# Patient Record
Sex: FEMALE | Race: ASIAN | NOT HISPANIC OR LATINO | Employment: UNEMPLOYED | URBAN - METROPOLITAN AREA
[De-identification: names, ages, dates, MRNs, and addresses within clinical notes are randomized per-mention and may not be internally consistent; named-entity substitution may affect disease eponyms.]

---

## 2017-01-24 ENCOUNTER — OFFICE VISIT - HEALTHEAST (OUTPATIENT)
Dept: FAMILY MEDICINE | Facility: CLINIC | Age: 52
End: 2017-01-24

## 2017-01-24 DIAGNOSIS — M54.41 CHRONIC RIGHT-SIDED LOW BACK PAIN WITH RIGHT-SIDED SCIATICA: ICD-10-CM

## 2017-01-24 DIAGNOSIS — G89.29 CHRONIC RIGHT-SIDED LOW BACK PAIN WITH RIGHT-SIDED SCIATICA: ICD-10-CM

## 2017-01-24 ASSESSMENT — MIFFLIN-ST. JEOR: SCORE: 1175.91

## 2017-02-09 ENCOUNTER — HOSPITAL ENCOUNTER (OUTPATIENT)
Dept: PHYSICAL MEDICINE AND REHAB | Facility: CLINIC | Age: 52
Discharge: HOME | End: 2017-02-09
Attending: PHYSICIAN ASSISTANT

## 2017-02-09 DIAGNOSIS — M51.26 LUMBAR DISC HERNIATION: ICD-10-CM

## 2017-02-09 DIAGNOSIS — M47.816 LUMBAR FACET ARTHROPATHY: ICD-10-CM

## 2017-02-09 DIAGNOSIS — M54.16 RIGHT LUMBAR RADICULOPATHY: ICD-10-CM

## 2017-02-09 DIAGNOSIS — M48.061 LUMBAR SPINAL STENOSIS: ICD-10-CM

## 2017-04-24 ENCOUNTER — HOSPITAL ENCOUNTER (OUTPATIENT)
Dept: LAB | Age: 52
Setting detail: SPECIMEN
Discharge: HOME | End: 2017-04-24

## 2017-04-24 ENCOUNTER — OFFICE VISIT - HEALTHEAST (OUTPATIENT)
Dept: FAMILY MEDICINE | Facility: CLINIC | Age: 52
End: 2017-04-24

## 2017-04-24 DIAGNOSIS — R30.0 DYSURIA: ICD-10-CM

## 2017-04-24 DIAGNOSIS — N30.00 ACUTE CYSTITIS WITHOUT HEMATURIA: ICD-10-CM

## 2017-04-24 ASSESSMENT — MIFFLIN-ST. JEOR: SCORE: 1197.46

## 2017-09-08 ENCOUNTER — OFFICE VISIT - HEALTHEAST (OUTPATIENT)
Dept: FAMILY MEDICINE | Facility: CLINIC | Age: 52
End: 2017-09-08

## 2017-09-08 DIAGNOSIS — N30.00 ACUTE CYSTITIS WITHOUT HEMATURIA: ICD-10-CM

## 2017-09-08 DIAGNOSIS — Z00.00 ROUTINE GENERAL MEDICAL EXAMINATION AT A HEALTH CARE FACILITY: ICD-10-CM

## 2017-09-08 DIAGNOSIS — G89.29 CHRONIC RIGHT-SIDED LOW BACK PAIN WITH RIGHT-SIDED SCIATICA: ICD-10-CM

## 2017-09-08 DIAGNOSIS — M54.41 CHRONIC RIGHT-SIDED LOW BACK PAIN WITH RIGHT-SIDED SCIATICA: ICD-10-CM

## 2017-09-08 ASSESSMENT — MIFFLIN-ST. JEOR: SCORE: 1184.99

## 2017-09-21 ENCOUNTER — AMBULATORY - HEALTHEAST (OUTPATIENT)
Dept: FAMILY MEDICINE | Facility: CLINIC | Age: 52
End: 2017-09-21

## 2017-09-22 ENCOUNTER — RECORDS - HEALTHEAST (OUTPATIENT)
Dept: MAMMOGRAPHY | Facility: CLINIC | Age: 52
End: 2017-09-22

## 2017-09-22 ENCOUNTER — OFFICE VISIT - HEALTHEAST (OUTPATIENT)
Dept: FAMILY MEDICINE | Facility: CLINIC | Age: 52
End: 2017-09-22

## 2017-09-22 DIAGNOSIS — N30.00 ACUTE CYSTITIS WITHOUT HEMATURIA: ICD-10-CM

## 2017-09-22 DIAGNOSIS — Z12.31 ENCOUNTER FOR SCREENING MAMMOGRAM FOR MALIGNANT NEOPLASM OF BREAST: ICD-10-CM

## 2017-09-22 DIAGNOSIS — Z23 NEED FOR VACCINATION: ICD-10-CM

## 2017-09-22 DIAGNOSIS — R52 PAIN: ICD-10-CM

## 2017-09-22 DIAGNOSIS — R20.8 BURNING SENSATION: ICD-10-CM

## 2017-09-22 ASSESSMENT — MIFFLIN-ST. JEOR: SCORE: 1186.12

## 2017-11-20 ENCOUNTER — OFFICE VISIT - HEALTHEAST (OUTPATIENT)
Dept: FAMILY MEDICINE | Facility: CLINIC | Age: 52
End: 2017-11-20

## 2017-11-20 DIAGNOSIS — M54.41 CHRONIC RIGHT-SIDED LOW BACK PAIN WITH RIGHT-SIDED SCIATICA: ICD-10-CM

## 2017-11-20 DIAGNOSIS — G89.29 CHRONIC RIGHT-SIDED LOW BACK PAIN WITH RIGHT-SIDED SCIATICA: ICD-10-CM

## 2017-11-20 DIAGNOSIS — R51.9 NONINTRACTABLE HEADACHE, UNSPECIFIED CHRONICITY PATTERN, UNSPECIFIED HEADACHE TYPE: ICD-10-CM

## 2017-11-20 ASSESSMENT — MIFFLIN-ST. JEOR: SCORE: 1174.78

## 2018-01-29 ENCOUNTER — OFFICE VISIT - HEALTHEAST (OUTPATIENT)
Dept: FAMILY MEDICINE | Facility: CLINIC | Age: 53
End: 2018-01-29

## 2018-01-29 DIAGNOSIS — G89.29 CHRONIC RIGHT-SIDED LOW BACK PAIN WITH RIGHT-SIDED SCIATICA: ICD-10-CM

## 2018-01-29 DIAGNOSIS — M54.41 CHRONIC RIGHT-SIDED LOW BACK PAIN WITH RIGHT-SIDED SCIATICA: ICD-10-CM

## 2018-01-29 RX ORDER — IBUPROFEN 400 MG/1
400 TABLET, FILM COATED ORAL DAILY PRN
Qty: 30 TABLET | Refills: 11 | Status: SHIPPED | OUTPATIENT
Start: 2018-01-29

## 2018-01-29 ASSESSMENT — MIFFLIN-ST. JEOR: SCORE: 1172.51

## 2018-03-30 ENCOUNTER — OFFICE VISIT - HEALTHEAST (OUTPATIENT)
Dept: FAMILY MEDICINE | Facility: CLINIC | Age: 53
End: 2018-03-30

## 2018-03-30 DIAGNOSIS — G89.29 CHRONIC RIGHT-SIDED LOW BACK PAIN WITHOUT SCIATICA: ICD-10-CM

## 2018-03-30 DIAGNOSIS — M54.50 CHRONIC RIGHT-SIDED LOW BACK PAIN WITHOUT SCIATICA: ICD-10-CM

## 2018-03-30 ASSESSMENT — MIFFLIN-ST. JEOR: SCORE: 1180.45

## 2018-04-09 ENCOUNTER — OFFICE VISIT - HEALTHEAST (OUTPATIENT)
Dept: FAMILY MEDICINE | Facility: CLINIC | Age: 53
End: 2018-04-09

## 2018-04-09 DIAGNOSIS — R41.3 MEMORY LOSS: ICD-10-CM

## 2018-04-09 ASSESSMENT — MIFFLIN-ST. JEOR: SCORE: 1180.45

## 2018-05-11 ENCOUNTER — OFFICE VISIT - HEALTHEAST (OUTPATIENT)
Dept: FAMILY MEDICINE | Facility: CLINIC | Age: 53
End: 2018-05-11

## 2018-05-11 DIAGNOSIS — M54.41 CHRONIC RIGHT-SIDED LOW BACK PAIN WITH RIGHT-SIDED SCIATICA: ICD-10-CM

## 2018-05-11 DIAGNOSIS — G89.29 CHRONIC RIGHT-SIDED LOW BACK PAIN WITH RIGHT-SIDED SCIATICA: ICD-10-CM

## 2018-05-11 ASSESSMENT — MIFFLIN-ST. JEOR: SCORE: 1191.79

## 2018-06-04 ENCOUNTER — COMMUNICATION - HEALTHEAST (OUTPATIENT)
Dept: PHYSICAL MEDICINE AND REHAB | Facility: CLINIC | Age: 53
End: 2018-06-04

## 2018-06-26 ENCOUNTER — RECORDS - HEALTHEAST (OUTPATIENT)
Dept: ADMINISTRATIVE | Facility: OTHER | Age: 53
End: 2018-06-26

## 2018-06-29 ENCOUNTER — OFFICE VISIT - HEALTHEAST (OUTPATIENT)
Dept: FAMILY MEDICINE | Facility: CLINIC | Age: 53
End: 2018-06-29

## 2018-06-29 DIAGNOSIS — R51.9 HEADACHE: ICD-10-CM

## 2018-07-03 ENCOUNTER — RECORDS - HEALTHEAST (OUTPATIENT)
Dept: ADMINISTRATIVE | Facility: OTHER | Age: 53
End: 2018-07-03

## 2018-07-17 ENCOUNTER — RECORDS - HEALTHEAST (OUTPATIENT)
Dept: ADMINISTRATIVE | Facility: OTHER | Age: 53
End: 2018-07-17

## 2019-05-07 ENCOUNTER — OFFICE VISIT - HEALTHEAST (OUTPATIENT)
Dept: FAMILY MEDICINE | Facility: CLINIC | Age: 54
End: 2019-05-07

## 2019-05-07 DIAGNOSIS — L30.9 DERMATITIS: ICD-10-CM

## 2019-05-07 DIAGNOSIS — J18.9 PNEUMONIA OF LEFT LOWER LOBE DUE TO INFECTIOUS ORGANISM: ICD-10-CM

## 2019-05-07 RX ORDER — DEXTROMETHORPHAN HBR. AND GUAIFENESIN 10; 100 MG/5ML; MG/5ML
SOLUTION ORAL
Qty: 240 ML | Refills: 3 | Status: SHIPPED | OUTPATIENT
Start: 2019-05-07 | End: 2022-07-14

## 2019-05-07 ASSESSMENT — MIFFLIN-ST. JEOR: SCORE: 1186.12

## 2019-06-18 ENCOUNTER — OFFICE VISIT - HEALTHEAST (OUTPATIENT)
Dept: FAMILY MEDICINE | Facility: CLINIC | Age: 54
End: 2019-06-18

## 2019-06-18 ENCOUNTER — RECORDS - HEALTHEAST (OUTPATIENT)
Dept: MAMMOGRAPHY | Facility: CLINIC | Age: 54
End: 2019-06-18

## 2019-06-18 DIAGNOSIS — J18.9 PNEUMONIA OF LEFT LOWER LOBE DUE TO INFECTIOUS ORGANISM: ICD-10-CM

## 2019-06-18 DIAGNOSIS — R52 PAIN: ICD-10-CM

## 2019-06-18 DIAGNOSIS — J18.1 LOBAR PNEUMONIA, UNSPECIFIED ORGANISM (H): ICD-10-CM

## 2019-06-18 DIAGNOSIS — Z12.31 ENCOUNTER FOR SCREENING MAMMOGRAM FOR MALIGNANT NEOPLASM OF BREAST: ICD-10-CM

## 2019-06-18 ASSESSMENT — MIFFLIN-ST. JEOR: SCORE: 1187.09

## 2019-06-19 ENCOUNTER — COMMUNICATION - HEALTHEAST (OUTPATIENT)
Dept: FAMILY MEDICINE | Facility: CLINIC | Age: 54
End: 2019-06-19

## 2019-07-22 ENCOUNTER — HOSPITAL ENCOUNTER (OUTPATIENT)
Dept: MAMMOGRAPHY | Facility: CLINIC | Age: 54
Discharge: HOME | End: 2019-07-22
Attending: FAMILY MEDICINE

## 2019-07-22 DIAGNOSIS — N64.89 BREAST ASYMMETRY: ICD-10-CM

## 2019-08-13 ENCOUNTER — OFFICE VISIT - HEALTHEAST (OUTPATIENT)
Dept: FAMILY MEDICINE | Facility: CLINIC | Age: 54
End: 2019-08-13

## 2019-08-13 DIAGNOSIS — R91.1 LUNG NODULE: ICD-10-CM

## 2019-08-13 DIAGNOSIS — M72.2 PLANTAR FASCIITIS: ICD-10-CM

## 2019-08-13 ASSESSMENT — MIFFLIN-ST. JEOR: SCORE: 1182.55

## 2019-08-21 ENCOUNTER — HOSPITAL ENCOUNTER (OUTPATIENT)
Dept: CT IMAGING | Facility: HOSPITAL | Age: 54
Discharge: HOME | End: 2019-08-21
Attending: FAMILY MEDICINE

## 2019-08-21 DIAGNOSIS — R91.1 LUNG NODULE: ICD-10-CM

## 2019-09-10 ENCOUNTER — OFFICE VISIT - HEALTHEAST (OUTPATIENT)
Dept: FAMILY MEDICINE | Facility: CLINIC | Age: 54
End: 2019-09-10

## 2019-09-10 DIAGNOSIS — D32.9 MENINGIOMA (H): ICD-10-CM

## 2019-09-10 DIAGNOSIS — J98.4 CALCIFIED GRANULOMA OF LUNG: ICD-10-CM

## 2019-09-10 ASSESSMENT — MIFFLIN-ST. JEOR: SCORE: 1188.2

## 2019-12-18 ENCOUNTER — OFFICE VISIT - HEALTHEAST (OUTPATIENT)
Dept: FAMILY MEDICINE | Facility: CLINIC | Age: 54
End: 2019-12-18

## 2019-12-18 ENCOUNTER — COMMUNICATION - HEALTHEAST (OUTPATIENT)
Dept: FAMILY MEDICINE | Facility: CLINIC | Age: 54
End: 2019-12-18

## 2019-12-18 DIAGNOSIS — N30.01 ACUTE CYSTITIS WITH HEMATURIA: ICD-10-CM

## 2019-12-18 DIAGNOSIS — L30.9 DERMATITIS: ICD-10-CM

## 2019-12-18 DIAGNOSIS — R30.0 DYSURIA: ICD-10-CM

## 2019-12-18 LAB
ALBUMIN UR-MCNC: ABNORMAL MG/DL
APPEARANCE UR: ABNORMAL
BILIRUB UR QL STRIP: NEGATIVE
COLOR UR AUTO: YELLOW
GLUCOSE UR STRIP-MCNC: NEGATIVE MG/DL
HGB UR QL STRIP: ABNORMAL
KETONES UR STRIP-MCNC: NEGATIVE MG/DL
LEUKOCYTE ESTERASE UR QL STRIP: ABNORMAL
NITRATE UR QL: NEGATIVE
PH UR STRIP: 5.5 [PH] (ref 5–8)
SP GR UR STRIP: 1.02 (ref 1–1.03)
UROBILINOGEN UR STRIP-ACNC: ABNORMAL

## 2019-12-18 RX ORDER — HYDROCORTISONE 2.5 %
CREAM (GRAM) TOPICAL
Qty: 30 G | Refills: 5 | Status: SHIPPED | OUTPATIENT
Start: 2019-12-18 | End: 2021-12-17

## 2019-12-18 ASSESSMENT — MIFFLIN-ST. JEOR: SCORE: 1209.47

## 2019-12-19 LAB — BACTERIA SPEC CULT: NO GROWTH

## 2020-01-21 ENCOUNTER — OFFICE VISIT - HEALTHEAST (OUTPATIENT)
Dept: FAMILY MEDICINE | Facility: CLINIC | Age: 55
End: 2020-01-21

## 2020-01-21 DIAGNOSIS — D32.9 MENINGIOMA (H): ICD-10-CM

## 2020-01-21 DIAGNOSIS — R52 PAIN: ICD-10-CM

## 2020-01-21 DIAGNOSIS — R30.0 DYSURIA: ICD-10-CM

## 2020-01-21 LAB
ALBUMIN UR-MCNC: ABNORMAL MG/DL
ANION GAP SERPL CALCULATED.3IONS-SCNC: 8 MMOL/L (ref 5–18)
APPEARANCE UR: ABNORMAL
BACTERIA #/AREA URNS HPF: ABNORMAL HPF
BILIRUB UR QL STRIP: NEGATIVE
BUN SERPL-MCNC: 10 MG/DL (ref 8–22)
CALCIUM SERPL-MCNC: 9.1 MG/DL (ref 8.5–10.5)
CAOX CRY #/AREA URNS HPF: PRESENT /[HPF]
CHLORIDE BLD-SCNC: 105 MMOL/L (ref 98–107)
CO2 SERPL-SCNC: 27 MMOL/L (ref 22–31)
COLOR UR AUTO: YELLOW
CREAT SERPL-MCNC: 0.75 MG/DL (ref 0.6–1.1)
GFR SERPL CREATININE-BSD FRML MDRD: >60 ML/MIN/1.73M2
GLUCOSE BLD-MCNC: 125 MG/DL (ref 70–125)
GLUCOSE UR STRIP-MCNC: NEGATIVE MG/DL
HGB UR QL STRIP: ABNORMAL
KETONES UR STRIP-MCNC: NEGATIVE MG/DL
LEUKOCYTE ESTERASE UR QL STRIP: NEGATIVE
MUCOUS THREADS #/AREA URNS LPF: ABNORMAL LPF
NITRATE UR QL: NEGATIVE
PH UR STRIP: 5.5 [PH] (ref 5–8)
POTASSIUM BLD-SCNC: 3.6 MMOL/L (ref 3.5–5)
RBC #/AREA URNS AUTO: ABNORMAL HPF
SODIUM SERPL-SCNC: 140 MMOL/L (ref 136–145)
SP GR UR STRIP: >=1.03 (ref 1–1.03)
SQUAMOUS #/AREA URNS AUTO: ABNORMAL LPF
UROBILINOGEN UR STRIP-ACNC: ABNORMAL
WBC #/AREA URNS AUTO: ABNORMAL HPF

## 2020-01-21 ASSESSMENT — MIFFLIN-ST. JEOR: SCORE: 1199.54

## 2020-01-29 ENCOUNTER — HOSPITAL ENCOUNTER (OUTPATIENT)
Dept: MRI IMAGING | Facility: HOSPITAL | Age: 55
Discharge: HOME | End: 2020-01-29
Attending: FAMILY MEDICINE

## 2020-01-29 DIAGNOSIS — D32.9 MENINGIOMA (H): ICD-10-CM

## 2020-02-04 ENCOUNTER — OFFICE VISIT - HEALTHEAST (OUTPATIENT)
Dept: FAMILY MEDICINE | Facility: CLINIC | Age: 55
End: 2020-02-04

## 2020-02-04 DIAGNOSIS — D32.9 MENINGIOMA (H): ICD-10-CM

## 2020-02-04 ASSESSMENT — MIFFLIN-ST. JEOR: SCORE: 1200.7

## 2020-02-14 ENCOUNTER — OFFICE VISIT - HEALTHEAST (OUTPATIENT)
Dept: FAMILY MEDICINE | Facility: CLINIC | Age: 55
End: 2020-02-14

## 2020-02-14 DIAGNOSIS — R30.0 DYSURIA: ICD-10-CM

## 2020-02-14 ASSESSMENT — MIFFLIN-ST. JEOR: SCORE: 1201.32

## 2020-06-22 ENCOUNTER — OFFICE VISIT - HEALTHEAST (OUTPATIENT)
Dept: FAMILY MEDICINE | Facility: CLINIC | Age: 55
End: 2020-06-22

## 2020-06-22 DIAGNOSIS — L30.9 DERMATITIS: ICD-10-CM

## 2020-06-22 ASSESSMENT — PATIENT HEALTH QUESTIONNAIRE - PHQ9: SUM OF ALL RESPONSES TO PHQ QUESTIONS 1-9: 5

## 2020-06-23 RX ORDER — TRIAMCINOLONE ACETONIDE 1 MG/G
CREAM TOPICAL
Qty: 80 G | Refills: 5 | Status: SHIPPED | OUTPATIENT
Start: 2020-06-23 | End: 2022-01-13

## 2020-11-27 ENCOUNTER — OFFICE VISIT - HEALTHEAST (OUTPATIENT)
Dept: FAMILY MEDICINE | Facility: CLINIC | Age: 55
End: 2020-11-27

## 2020-11-27 DIAGNOSIS — N30.00 ACUTE CYSTITIS WITHOUT HEMATURIA: ICD-10-CM

## 2020-11-27 DIAGNOSIS — Z23 IMMUNIZATION DUE: ICD-10-CM

## 2020-11-27 ASSESSMENT — MIFFLIN-ST. JEOR: SCORE: 1199.05

## 2021-01-04 ENCOUNTER — OFFICE VISIT - HEALTHEAST (OUTPATIENT)
Dept: FAMILY MEDICINE | Facility: CLINIC | Age: 56
End: 2021-01-04

## 2021-01-04 DIAGNOSIS — M54.41 CHRONIC RIGHT-SIDED LOW BACK PAIN WITH RIGHT-SIDED SCIATICA: ICD-10-CM

## 2021-01-04 DIAGNOSIS — G89.29 CHRONIC RIGHT-SIDED LOW BACK PAIN WITH RIGHT-SIDED SCIATICA: ICD-10-CM

## 2021-01-04 DIAGNOSIS — R52 PAIN: ICD-10-CM

## 2021-01-04 RX ORDER — GABAPENTIN 100 MG/1
CAPSULE ORAL
Qty: 30 CAPSULE | Refills: 11 | Status: SHIPPED | OUTPATIENT
Start: 2021-01-04 | End: 2022-03-11

## 2021-01-04 RX ORDER — ACETAMINOPHEN 500 MG
500 TABLET ORAL EVERY 6 HOURS PRN
Qty: 100 TABLET | Refills: 11 | Status: SHIPPED | OUTPATIENT
Start: 2021-01-04 | End: 2024-07-02

## 2021-01-04 ASSESSMENT — MIFFLIN-ST. JEOR: SCORE: 1199.05

## 2021-03-04 ENCOUNTER — COMMUNICATION - HEALTHEAST (OUTPATIENT)
Dept: FAMILY MEDICINE | Facility: CLINIC | Age: 56
End: 2021-03-04

## 2021-04-10 ENCOUNTER — AMBULATORY - HEALTHEAST (OUTPATIENT)
Dept: NURSING | Facility: CLINIC | Age: 56
End: 2021-04-10

## 2021-04-23 ENCOUNTER — RECORDS - HEALTHEAST (OUTPATIENT)
Dept: MAMMOGRAPHY | Facility: CLINIC | Age: 56
End: 2021-04-23

## 2021-04-23 ENCOUNTER — OFFICE VISIT - HEALTHEAST (OUTPATIENT)
Dept: FAMILY MEDICINE | Facility: CLINIC | Age: 56
End: 2021-04-23

## 2021-04-23 DIAGNOSIS — M54.41 CHRONIC RIGHT-SIDED LOW BACK PAIN WITH RIGHT-SIDED SCIATICA: ICD-10-CM

## 2021-04-23 DIAGNOSIS — Z12.31 ENCOUNTER FOR SCREENING MAMMOGRAM FOR MALIGNANT NEOPLASM OF BREAST: ICD-10-CM

## 2021-04-23 DIAGNOSIS — G89.29 CHRONIC RIGHT-SIDED LOW BACK PAIN WITH RIGHT-SIDED SCIATICA: ICD-10-CM

## 2021-04-23 ASSESSMENT — MIFFLIN-ST. JEOR: SCORE: 1210.05

## 2021-05-27 ASSESSMENT — PATIENT HEALTH QUESTIONNAIRE - PHQ9: SUM OF ALL RESPONSES TO PHQ QUESTIONS 1-9: 5

## 2021-05-28 NOTE — PROGRESS NOTES
Assessment: /    Plan:    1. Pneumonia of left lower lobe due to infectious organism (H)  dextromethorphan-guaiFENesin (ROBITUSSIN-DM)  mg/5 mL liquid   2. Dermatitis  hydrocortisone 2.5 % cream       Return in 6 weeks for follow-up chest x-ray.  Patient was seen with professional [facility] , Sena Archuleta.      Subjective:    HPI:  Law Cruz is a 54-year-old female presenting for follow-up of ER visit at [facility] on May 5.  She was found to have left lower lobe pneumonia.  She was started on Augmentin.  She continues to have a hoarse voice.  She needs cough medicine.    She also notes pink area below the sternal notch.      Review of Systems: No fever or wheezing.      Current Outpatient Medications   Medication Sig Dispense Refill     acetaminophen (TYLENOL) 500 MG tablet Take 1 tablet (500 mg total) by mouth every 6 (six) hours as needed for pain. 100 tablet 3     amoxicillin-clavulanate (AUGMENTIN) 875-125 mg per tablet Take 1 tablet by mouth every 12 (twelve) hours for 10 days. 19 tablet 0     dextromethorphan-guaiFENesin (ROBITUSSIN-DM)  mg/5 mL liquid 10 ml 4 times daily as needed for cough 240 mL 3     gabapentin (NEURONTIN) 100 MG capsule Take 1 capsule at bedtime 30 capsule 11     hydrocortisone 2.5 % cream Apply to affected skin 2 times daily 30 g 5     ibuprofen (ADVIL,MOTRIN) 400 MG tablet Take 1 tablet (400 mg total) by mouth daily as needed for pain. 30 tablet 11     No current facility-administered medications for this visit.          Objective:    Vitals:    05/07/19 1424   BP: 114/76   Pulse: 86   Resp: 18   Temp: 98.6  F (37  C)   SpO2: 94%       Gen:  NAD, VSS  Ears normal  Throat normal  Neck supple without adenopathy  Lungs:  normal  Heart:  normal  Skin with mild pink coloration below the sternal notch        ADDITIONAL HISTORY SUMMARIZED (2): Reviewed ER note.  DECISION TO OBTAIN EXTRA INFORMATION (1): None.   RADIOLOGY TESTS (1): Reviewed chest x-ray.  LABS (1): Reviewed  strep and influenza labs.  MEDICINE TESTS (1): None.  INDEPENDENT REVIEW (2 each): None.     Total Data Points: 4

## 2021-05-29 NOTE — TELEPHONE ENCOUNTER
Nuria with [facility] calling. She states that she has abnormal results which she must relay directly to the physician.     CMT will hand-carry note to physician for review and calling. Requesting call back from PCP within the hour.

## 2021-05-29 NOTE — PROGRESS NOTES
Assessment: /    Plan:    1. Pneumonia of left lower lobe due to infectious organism (H)  XR Chest 2 Views   2. Pain  acetaminophen (TYLENOL) 500 MG tablet       Recheck in 2 months.    Patient was seen with professional Yany , Markos Richmond.      Subjective:    HPI:  Law Cruz is a 54-year-old female presented for follow-up on pneumonia.  She has a slight cough.    He also notes right foot soreness for the past 2 days.  There was no trauma.       Review of Systems: No fever or wheezing.      Current Outpatient Medications   Medication Sig Dispense Refill     acetaminophen (TYLENOL) 500 MG tablet Take 1 tablet (500 mg total) by mouth every 6 (six) hours as needed for pain. 100 tablet 11     dextromethorphan-guaiFENesin (ROBITUSSIN-DM)  mg/5 mL liquid 10 ml 4 times daily as needed for cough 240 mL 3     gabapentin (NEURONTIN) 100 MG capsule Take 1 capsule at bedtime 30 capsule 11     hydrocortisone 2.5 % cream Apply to affected skin 2 times daily 30 g 5     ibuprofen (ADVIL,MOTRIN) 400 MG tablet Take 1 tablet (400 mg total) by mouth daily as needed for pain. 30 tablet 11     No current facility-administered medications for this visit.          Objective:    Vitals:    06/18/19 1436   BP: 96/64   Pulse: 80   Resp: 17   Temp: 98.3  F (36.8  C)   SpO2: 97%       Gen:  NAD, VSS  Lungs:  normal  Heart:  normal  Right foot is normal    Chest x-ray demonstrates improvement of the infiltrate on the left.  Nodule on the right is unchanged.    ADDITIONAL HISTORY SUMMARIZED (2): None.  DECISION TO OBTAIN EXTRA INFORMATION (1): None.   RADIOLOGY TESTS (1): Chest x-ray.  LABS (1): None.  MEDICINE TESTS (1): None.  INDEPENDENT REVIEW (2 each): I personally interpreted today's chest x-ray.     Total Data Points: 3

## 2021-05-30 VITALS — HEIGHT: 62 IN | BODY MASS INDEX: 25.62 KG/M2 | WEIGHT: 139.25 LBS

## 2021-05-30 VITALS — WEIGHT: 144 LBS | HEIGHT: 62 IN | BODY MASS INDEX: 26.5 KG/M2

## 2021-05-30 VITALS — BODY MASS INDEX: 25.72 KG/M2 | WEIGHT: 139.5 LBS

## 2021-05-31 VITALS — BODY MASS INDEX: 26.04 KG/M2 | HEIGHT: 62 IN | WEIGHT: 141.5 LBS

## 2021-05-31 VITALS — BODY MASS INDEX: 25.49 KG/M2 | HEIGHT: 62 IN | WEIGHT: 138.5 LBS

## 2021-05-31 VITALS — WEIGHT: 139 LBS | BODY MASS INDEX: 25.58 KG/M2 | HEIGHT: 62 IN

## 2021-05-31 VITALS — WEIGHT: 141.25 LBS | BODY MASS INDEX: 25.99 KG/M2 | HEIGHT: 62 IN

## 2021-05-31 NOTE — PROGRESS NOTES
Assessment: /    Plan:    1. Lung nodule  CT Chest Without Contrast   2. Plantar fasciitis         Lung nodule, CT scheduled.    Obtain footwear with cushion insoles.  Avoid prolonged standing.    Recheck in 1 month.    Patient was seen with professional Yany Sena.      Subjective:    HPI:  Law Cruz is a 54-year-old female presenting for follow-up of abnormal chest x-ray.  There is a tiny irregular nodule in the right upper lobe.    She also notes pain of the right foot, worst when she first gets out of bed in the morning.  She wears flip-flops.       Review of Systems: No fever, cough, chest pain.      Current Outpatient Medications   Medication Sig Dispense Refill     acetaminophen (TYLENOL) 500 MG tablet Take 1 tablet (500 mg total) by mouth every 6 (six) hours as needed for pain. 100 tablet 11     dextromethorphan-guaiFENesin (ROBITUSSIN-DM)  mg/5 mL liquid 10 ml 4 times daily as needed for cough 240 mL 3     gabapentin (NEURONTIN) 100 MG capsule Take 1 capsule at bedtime 30 capsule 11     hydrocortisone 2.5 % cream Apply to affected skin 2 times daily 30 g 5     ibuprofen (ADVIL,MOTRIN) 400 MG tablet Take 1 tablet (400 mg total) by mouth daily as needed for pain. 30 tablet 11     No current facility-administered medications for this visit.          Objective:    Vitals:    08/13/19 1333   BP: 100/74   Pulse: 76   Temp: 98  F (36.7  C)   SpO2: 96%       Gen:  NAD, VSS  Lungs:  normal  Heart:  normal  Right foot is normal, no bony tenderness.  Mild plantar tenderness.  Toes are normal.        ADDITIONAL HISTORY SUMMARIZED (2): None.  DECISION TO OBTAIN EXTRA INFORMATION (1): None.   RADIOLOGY TESTS (1): None.  LABS (1): None.  MEDICINE TESTS (1): None.  INDEPENDENT REVIEW (2 each): None.     Total Data Points: 0

## 2021-06-01 VITALS — BODY MASS INDEX: 25.81 KG/M2 | HEIGHT: 62 IN | WEIGHT: 140.25 LBS

## 2021-06-01 VITALS — WEIGHT: 142 LBS | BODY MASS INDEX: 26.18 KG/M2

## 2021-06-01 VITALS — WEIGHT: 142.75 LBS | BODY MASS INDEX: 26.27 KG/M2 | HEIGHT: 62 IN

## 2021-06-01 VITALS — WEIGHT: 140.25 LBS | HEIGHT: 62 IN | BODY MASS INDEX: 25.81 KG/M2

## 2021-06-01 NOTE — PROGRESS NOTES
Assessment: /    Plan:    1. Calcified granuloma of lung (H)     2. Meningioma (H)         Patient was seen with professional [employer] telephone .        Subjective:    HPI:  Law Cruz is a 54-year-old female presenting for follow-up on lung CT.  This demonstrated a calcified granuloma.    Last year she had a CT of the head which demonstrated meningioma.    Her foot has been getting better since her previous visit.      Review of Systems: No fever or cough.      Current Outpatient Medications   Medication Sig Dispense Refill     acetaminophen (TYLENOL) 500 MG tablet Take 1 tablet (500 mg total) by mouth every 6 (six) hours as needed for pain. 100 tablet 11     dextromethorphan-guaiFENesin (ROBITUSSIN-DM)  mg/5 mL liquid 10 ml 4 times daily as needed for cough 240 mL 3     gabapentin (NEURONTIN) 100 MG capsule Take 1 capsule at bedtime 30 capsule 11     hydrocortisone 2.5 % cream Apply to affected skin 2 times daily 30 g 5     ibuprofen (ADVIL,MOTRIN) 400 MG tablet Take 1 tablet (400 mg total) by mouth daily as needed for pain. 30 tablet 11     No current facility-administered medications for this visit.          Objective:    Vitals:    09/10/19 1027   BP: 112/82   Pulse: 70   Resp: 17   Temp: 98.7  F (37.1  C)   SpO2: 98%       Gen:  NAD, VSS  Lungs:  normal  Heart:  normal          ADDITIONAL HISTORY SUMMARIZED (2): None.  DECISION TO OBTAIN EXTRA INFORMATION (1): None.   RADIOLOGY TESTS (1): Reviewed CT.  LABS (1): None.  MEDICINE TESTS (1): None.  INDEPENDENT REVIEW (2 each): None.     Total Data Points: 1

## 2021-06-03 VITALS — HEIGHT: 62 IN | BODY MASS INDEX: 26.04 KG/M2 | WEIGHT: 141.5 LBS

## 2021-06-03 VITALS
OXYGEN SATURATION: 98 % | DIASTOLIC BLOOD PRESSURE: 82 MMHG | WEIGHT: 141.75 LBS | HEIGHT: 62 IN | HEART RATE: 70 BPM | SYSTOLIC BLOOD PRESSURE: 112 MMHG | TEMPERATURE: 98.7 F | RESPIRATION RATE: 17 BRPM | BODY MASS INDEX: 26.09 KG/M2

## 2021-06-03 VITALS — WEIGHT: 140.5 LBS | BODY MASS INDEX: 25.86 KG/M2 | HEIGHT: 62 IN

## 2021-06-03 VITALS — HEIGHT: 62 IN | WEIGHT: 141.5 LBS | BODY MASS INDEX: 26.04 KG/M2

## 2021-06-04 VITALS
WEIGHT: 144.25 LBS | SYSTOLIC BLOOD PRESSURE: 120 MMHG | OXYGEN SATURATION: 98 % | TEMPERATURE: 98.7 F | HEIGHT: 62 IN | BODY MASS INDEX: 26.54 KG/M2 | RESPIRATION RATE: 20 BRPM | HEART RATE: 78 BPM | DIASTOLIC BLOOD PRESSURE: 74 MMHG

## 2021-06-04 VITALS
TEMPERATURE: 98.2 F | HEIGHT: 62 IN | RESPIRATION RATE: 20 BRPM | WEIGHT: 144.5 LBS | OXYGEN SATURATION: 98 % | HEART RATE: 73 BPM | SYSTOLIC BLOOD PRESSURE: 110 MMHG | DIASTOLIC BLOOD PRESSURE: 68 MMHG | BODY MASS INDEX: 26.59 KG/M2

## 2021-06-04 VITALS
DIASTOLIC BLOOD PRESSURE: 66 MMHG | BODY MASS INDEX: 26.95 KG/M2 | SYSTOLIC BLOOD PRESSURE: 96 MMHG | HEART RATE: 76 BPM | TEMPERATURE: 97.6 F | RESPIRATION RATE: 18 BRPM | WEIGHT: 146.44 LBS | HEIGHT: 62 IN | OXYGEN SATURATION: 99 %

## 2021-06-04 VITALS
SYSTOLIC BLOOD PRESSURE: 106 MMHG | OXYGEN SATURATION: 96 % | RESPIRATION RATE: 20 BRPM | HEIGHT: 62 IN | BODY MASS INDEX: 26.59 KG/M2 | WEIGHT: 144.5 LBS | TEMPERATURE: 98.3 F | HEART RATE: 64 BPM | DIASTOLIC BLOOD PRESSURE: 74 MMHG

## 2021-06-04 NOTE — PROGRESS NOTES
"ASSESSMENT AND PLAN:  1. Dysuria  Experiencing symptoms for the last 2 days.  - Urinalysis-UC if Indicated  - Culture, Urine    2. Acute cystitis with hematuria    Urinalysis was positive ciprofloxacin started side effects discussed.  - ciprofloxacin HCl (CIPRO) 500 MG tablet; Take 1 tablet (500 mg total) by mouth 2 (two) times a day for 10 days.  Dispense: 20 tablet; Refill: 0    3. Dermatitis    She continues to have an itchy rash I refilled her low potency steroid cream  - hydrocortisone 2.5 % cream; Apply to affected skin 2 times daily  Dispense: 30 g; Refill: 5            Orders Placed This Encounter   Procedures     Culture, Urine     Urinalysis-UC if Indicated     Medications Discontinued During This Encounter   Medication Reason     hydrocortisone 2.5 % cream Reorder       No follow-ups on file.    CHIEF COMPLAINT:  Chief Complaint   Patient presents with     Urinary Tract Infection       HISTORY OF PRESENT ILLNESS:   is a 54 y.o. female who presents to the clinic today for a possible urinary tract infection.  is present with her daughter who is acting as a [employer] .  states that she drinks water constantly but if she skips one day she usually gets an infection. She notes that her lower left abdomen hurts all the time but during an infection the pain is more intense.     REVIEW OF SYSTEMS:   Admits to dysuria, bladder pain, and back pain  All other 10 point review of systems are negative.    PFSH:  Present with adult daughter who needs a work excuse for bringing Law in.     TOBACCO USE:  Social History     Tobacco Use   Smoking Status Never Smoker   Smokeless Tobacco Current User     Types: Chew   Tobacco Comment    Betel Nut.       VITALS:  Vitals:    12/18/19 1332   BP: 96/66   Pulse: 76   Resp: 18   Temp: 97.6  F (36.4  C)   TempSrc: Oral   SpO2: 99%   Weight: 146 lb 7 oz (66.4 kg)   Height: 5' 1.81\" (1.57 m)     Wt Readings from Last 3 Encounters:   12/18/19 146 lb 7 oz (66.4 kg) "   09/10/19 141 lb 12 oz (64.3 kg)   08/13/19 140 lb 8 oz (63.7 kg)     Body mass index is 26.95 kg/m .       PHYSICAL EXAM:  General: Alert, cooperative, no distress, appears stated age  Head: Normocephalic, without obvious abnormality, atraumatic  Abdomen: Soft, bowel sounds active all four quadrants.  Guarding of the right lower quadrant of abdomen   Neurologic:  A & O x 3.  No tremor, no focal findings.   DTRs are normal and symmetric both proximally and distally BUE and BLE.  Strength testing is normal and symetric both proximally and distally BUE and BLE.  Toes downgoing bilaterally.  Normal gait.   Skin itchy and dry skin noted on her hands and forearms.        DATA REVIEWED:  Additional History from Old Records Summarized (2): Reviewed [facility] office visit encounter for bladder infection  Decision to Obtain Records (1): None  Radiology Tests Summarized or Ordered (1): None  Labs Reviewed or Ordered (1): Ordered and Reviewed  Medicine Test Summarized or Ordered (1): None  Independent Review of EKG or X-RAY(2 each): None    The visit lasted a total of 17 minutes face to face with the patient. Over 50% of the time was spent counseling and educating the patient about Urinary tract infection.    IRoman, am scribing for and in the presence of, Dr. Hicks.    I, Dr. Hicks, personally performed the services described in this documentation, as scribed by oRman Moss in my presence, and it is both accurate and complete.      MEDICATIONS:  Current Outpatient Medications   Medication Sig Dispense Refill     acetaminophen (TYLENOL) 500 MG tablet Take 1 tablet (500 mg total) by mouth every 6 (six) hours as needed for pain. 100 tablet 11     ciprofloxacin HCl (CIPRO) 500 MG tablet Take 1 tablet (500 mg total) by mouth 2 (two) times a day for 10 days. 20 tablet 0     dextromethorphan-guaiFENesin (ROBITUSSIN-DM)  mg/5 mL liquid 10 ml 4 times daily as needed for cough 240 mL 3     gabapentin (NEURONTIN) 100 MG  capsule Take 1 capsule at bedtime 30 capsule 11     hydrocortisone 2.5 % cream Apply to affected skin 2 times daily 30 g 5     ibuprofen (ADVIL,MOTRIN) 400 MG tablet Take 1 tablet (400 mg total) by mouth daily as needed for pain. 30 tablet 11     No current facility-administered medications for this visit.        Total Data Points: 3    Please note that this clinical encounter uses voice recognition software, there may be typographical errors present

## 2021-06-04 NOTE — TELEPHONE ENCOUNTER
Medication Question or Clarification  Who is calling: Pharmacy: Syed from [facility]  What medication are you calling about? (include dose and sig)   Disp Refills Start End     hydrocortisone 2.5 % cream 30 g 5 12/18/2019 12/17/2021    Sig: Apply to affected skin 2 times daily    Sent to pharmacy as: hydrocortisone 2.5 % topical cream    E-Prescribing Status: Receipt confirmed by pharmacy (12/18/2019  2:06 PM CST)      Who prescribed the medication?: Dr. Hicks   What is your question/concern?: Caller stated they need to know where the patient will be applying the hydrocortisone cream to, for insurance purposes. Caller stated to please call them back because patient was there but is coming back to  the Rx.  Pharmacy: [facility] [address] to leave a detailed message?: No  Site CMT - Please call the pharmacy to obtain any additional needed information.

## 2021-06-05 VITALS
DIASTOLIC BLOOD PRESSURE: 76 MMHG | HEART RATE: 84 BPM | BODY MASS INDEX: 26.5 KG/M2 | OXYGEN SATURATION: 97 % | TEMPERATURE: 98.5 F | RESPIRATION RATE: 16 BRPM | WEIGHT: 144 LBS | HEIGHT: 62 IN | SYSTOLIC BLOOD PRESSURE: 120 MMHG

## 2021-06-05 VITALS
SYSTOLIC BLOOD PRESSURE: 108 MMHG | HEIGHT: 62 IN | TEMPERATURE: 98.5 F | OXYGEN SATURATION: 98 % | BODY MASS INDEX: 26.85 KG/M2 | DIASTOLIC BLOOD PRESSURE: 66 MMHG | HEART RATE: 85 BPM | WEIGHT: 145.9 LBS

## 2021-06-05 VITALS
HEIGHT: 62 IN | SYSTOLIC BLOOD PRESSURE: 108 MMHG | TEMPERATURE: 98.4 F | DIASTOLIC BLOOD PRESSURE: 64 MMHG | HEART RATE: 74 BPM | WEIGHT: 144 LBS | BODY MASS INDEX: 26.5 KG/M2 | OXYGEN SATURATION: 99 %

## 2021-06-05 NOTE — PROGRESS NOTES
Assessment: /    Plan:    1. Meningioma (H)         She will return March 4 for follow-up.    [facility]  was not available today, therefore the patient called her daughter, Silvia Cruz, who interpreted via telephone.      Subjective:    HPI:  Law Cruz is a 55-year-old female presenting for follow-up on headaches and meningioma.  MRI demonstrated no change in the meningiomas.  Headaches have occurred intermittently for a number of years.  Onset was following a shot for birth control.    He also notes low back pain.  She had an MRI of the lumbar spine in 2016 demonstrated disc herniation at L4-5 and L5-S1.  She was seen in the spine clinic following that.         Review of Systems: No fever or cough.      Current Outpatient Medications   Medication Sig Dispense Refill     acetaminophen (TYLENOL) 500 MG tablet Take 1 tablet (500 mg total) by mouth every 6 (six) hours as needed for pain. 100 tablet 11     dextromethorphan-guaiFENesin (ROBITUSSIN-DM)  mg/5 mL liquid 10 ml 4 times daily as needed for cough 240 mL 3     gabapentin (NEURONTIN) 100 MG capsule Take 1 capsule at bedtime 30 capsule 11     hydrocortisone 2.5 % cream Apply to affected skin 2 times daily 30 g 5     ibuprofen (ADVIL,MOTRIN) 400 MG tablet Take 1 tablet (400 mg total) by mouth daily as needed for pain. 30 tablet 11     No current facility-administered medications for this visit.          Objective:    Vitals:    02/04/20 1309   BP: 110/68   Pulse: 73   Resp: 20   Temp: 98.2  F (36.8  C)   SpO2: 98%       Gen:  NAD, VSS  Lungs:  normal  Heart:  normal  Back with left lumbar paraspinal tenderness.  Right straight leg raise leads to left buttock and leg pain.  Left straight leg raise normal        ADDITIONAL HISTORY SUMMARIZED (2): None.  DECISION TO OBTAIN EXTRA INFORMATION (1): None.   RADIOLOGY TESTS (1):  reviewed brain MRI.  LABS (1): None.  MEDICINE TESTS (1): None.  INDEPENDENT REVIEW (2 each): None.     Total Data Points: 1

## 2021-06-05 NOTE — PROGRESS NOTES
Assessment: /    Plan:    1. Meningioma (H)  Basic Metabolic Panel    MR Brain With Without Contrast   2. Dysuria  Urinalysis-UC if Indicated   3. Pain  acetaminophen (TYLENOL) 500 MG tablet       Return in 1 week after the MRI.    Patient was seen with professional Anthony , Markos Richmond.      Subjective:    HPI:  Law Cruz is a 55-year-old female presenting with headaches.  This is located on the left side of the head.  Headaches occur at various times of the day.  She had meningiomas noted on imaging previously.    She also notes increased urinary frequency.      Review of Systems: No change in vision, weakness or tingling of the arms or legs.      Current Outpatient Medications   Medication Sig Dispense Refill     acetaminophen (TYLENOL) 500 MG tablet Take 1 tablet (500 mg total) by mouth every 6 (six) hours as needed for pain. 100 tablet 11     dextromethorphan-guaiFENesin (ROBITUSSIN-DM)  mg/5 mL liquid 10 ml 4 times daily as needed for cough 240 mL 3     gabapentin (NEURONTIN) 100 MG capsule Take 1 capsule at bedtime 30 capsule 11     hydrocortisone 2.5 % cream Apply to affected skin 2 times daily 30 g 5     ibuprofen (ADVIL,MOTRIN) 400 MG tablet Take 1 tablet (400 mg total) by mouth daily as needed for pain. 30 tablet 11     No current facility-administered medications for this visit.          Objective:    Vitals:    01/21/20 1002   BP: 120/74   Pulse: 78   Resp: 20   Temp: 98.7  F (37.1  C)   SpO2: 98%       Gen:  NAD, VSS  Eyes normal  Ears normal  Throat normal  Neck supple without adenopathy   Lungs:  normal  Heart:  normal    UA negative      ADDITIONAL HISTORY SUMMARIZED (2): None.  DECISION TO OBTAIN EXTRA INFORMATION (1): None.   RADIOLOGY TESTS (1): None.  LABS (1): Ordered.  MEDICINE TESTS (1): None.  INDEPENDENT REVIEW (2 each): None.     Total Data Points: 1

## 2021-06-06 NOTE — PROGRESS NOTES
Assessment: /    Plan:    1. Dysuria         She has follow-up appointment March 4.    Patient was seen with professional Anthony Sena.      Subjective:    HPI:  Law Cruz is a 55-year-old female presenting for follow-up of ER visit February 11.  She was treated with nitrofurantoin.  Symptoms have improved.    She was treated for H. pylori in 2016.      Review of Systems: No fever or vomiting.      Current Outpatient Medications   Medication Sig Dispense Refill     acetaminophen (TYLENOL) 500 MG tablet Take 1 tablet (500 mg total) by mouth every 6 (six) hours as needed for pain. 100 tablet 11     dextromethorphan-guaiFENesin (ROBITUSSIN-DM)  mg/5 mL liquid 10 ml 4 times daily as needed for cough 240 mL 3     gabapentin (NEURONTIN) 100 MG capsule Take 1 capsule at bedtime 30 capsule 11     hydrocortisone 2.5 % cream Apply to affected skin 2 times daily 30 g 5     ibuprofen (ADVIL,MOTRIN) 400 MG tablet Take 1 tablet (400 mg total) by mouth daily as needed for pain. 30 tablet 11     No current facility-administered medications for this visit.          Objective:    Vitals:    02/14/20 0914   BP: 106/74   Pulse: 64   Resp: 20   Temp: 98.3  F (36.8  C)   SpO2: 96%       Gen:  NAD, VSS  Lungs:  normal  Heart:  normal  Abdomen:  No HSM, mass or tenderness  Back without CVA tenderness      ADDITIONAL HISTORY SUMMARIZED (2): None.  DECISION TO OBTAIN EXTRA INFORMATION (1): None.   RADIOLOGY TESTS (1): None.  LABS (1): None.  MEDICINE TESTS (1): None.  INDEPENDENT REVIEW (2 each): None.     Total Data Points: 0

## 2021-06-08 NOTE — PROGRESS NOTES
Assessment/Plan:    Diagnoses and all orders for this visit:    Right lumbar radiculopathy    Lumbar disc herniation    Lumbar spinal stenosis    Lumbar facet arthropathy      Discussion:  This is a 52 y.o. female with medical history significant of carpal tunnel syndrome who presents follow-up of right lumbar radiculopathy.  Patient is slightly better with the symptoms returning down her right leg.  She does not have any new neurological complaints.  She has right groin pain. MRI does show that she has impingement of the bilateral L5 nerve roots.  Her symptoms are not concordant with her MRI far as the posterior leg pain.  She has decreased dermatomes of the right L5 nerve that is consistent with L5 radiculopathy.  She does not have any right hip bursitis.  She takes 100 mg gabapentin and Tylenol.  I recommended patient to continue with the gabapentin maybe increase it to 3 times a day for total of 300 mg daily dose.  Patient does not want to restart physical therapy but she wants to start with spinal manipulation.      PLAN:    This was a shared treatment plan.  Patient fully understands the nature of the problem.      INTERVENTIONAL PAIN MANAGEMENT:    We may consider a the right S1-S2 TFESI or a right L5-S1 TFESI for her posterior leg pain.  She declined this time.     PHYSICAL THERAPY AND CHIROPRACTIC CARE:    She will continue with home exercises.  She will do 4 weeks of spinal manipulation using the flexion/distraction decompression table to improve function and decrease pain.    MEDICATIONS:    No change.  She takes 100 mg gabapentin as-needed basis per PCP.  Can continue with the Tylenol.    PATIENT EDUCATION:  Educated the patient on her condition and treatment plan.    The patient is in agreement with the above plan. All questions were answered.    FOLLOW-UP:   Patient will follow up within 1 week to start spinal manipulation and then 6 weeks and start physical therapy for follow-up.    20 minutes of  total visit time was spent face to face with the patient today 60% of the visit was spent on counseling, education, and coordinating care.     This note has been dictated using voice recognition software. Any grammatical or context distortions are unintentional and inherent to the software         Trino Chen DC, ALVERTO RAMOS         History of Present Ilness:   Law Cruz is a 52 y.o. female comes to [facility] here with the Lance  for follow-up of right lumbar radiculopathy and right hip bursitis.  She was last seen 8/25/2016 where she underwent physical therapy and spinal manipulation with some improvement of her symptoms.  She did not finish the course of physical therapy.  She reports that her symptoms is slowly returning but is still better than she first came in last year.  She rates the pain 8/10 of the right posterior leg with some right lateral leg pain and groin pain.  She states her leg pain is worse in her back.  She still takes the prescribed 100 mg gabapentin as needed basis by her PCP.  She takes over-the-counter Tylenol which helps her pain on as-needed basis.  She does the home exercises once a day.  She reports that she does not have any other new complaints.      REVIEW OF SYMPTOMS:  All systems are negative except:  Please refer to HPI.    EVALUATION TO DATE:    [facility]  MR LUMBAR SPINE WO CONTRAST  6/21/2016 6:12 PM      INDICATION: Sciatica, and/or radiculopathy, >6wks despite conservative tx. Right-sided low back pain.  TECHNIQUE: Performed without IV contrast.  SEDATION: None.  COMPARISON: None.     FINDINGS: Nomenclature is based on 5 lumbar type vertebral bodies. The conus ends at L1. Mild levocurvature of the lumbar spine. Vertebral body heights are maintained. Nonpathologic marrow signal. The sacroiliac joints are grossly within normal limits.   No MRI evidence for pars defects. The paraspinal soft tissues are grossly within normal limits. The distal  abdominal aorta has normal diameter. The psoas musculature are within normal limits. Borderline congenitally narrow spinal canal.     T12-L1: Normal intervertebral disc height and signal. No disc herniation. No facet arthropathy. No superimposed spinal canal narrowing. No neuroforaminal narrowing.     L1-L2: Normal intervertebral disc height and signal. Mild posterior annular bulge. No facet arthropathy. No superimposed spinal canal narrowing. No neuroforaminal narrowing.     L2-L3: Mild intervertebral disc height loss. Loss of the normal T2 signal within the disc. Broad-based disc bulge. Mild bilateral facet arthropathy. Mild narrowing of the ventral spinal canal. Encroachment of the inferior neuroforamina without   significant neuroforaminal narrowing.     L3-L4: Normal intervertebral disc height and mild loss of the normal T2 signal within the disc. Broad-based disc bulge. Mild bilateral facet arthropathy. No superimposed spinal canal narrowing. No neuroforaminal narrowing.     L4-L5: Normal intervertebral disc height. Loss of the normal T2 signal within the disc. Broad-based disc bulge with superimposed small central disc fusion and annular fissure. Moderate bilateral facet arthropathy. Mild to moderate spinal canal narrowing.  Narrowing of the lateral recesses bilaterally. Probable impingement of the bilateral traversing L5 nerve roots in the lateral recesses. Mild right neuroforaminal narrowing. Mild to moderate left neuroforaminal narrowing.     L5-S1: Normal intervertebral disc height. Loss of the normal T2 signal within the disc. Broad-based disc bulge with superimposed left central/left paracentral disc protrusion. Moderate bilateral facet arthropathy. Effacement of the lateral recesses, left  greater than right. No right neuroforaminal narrowing. Mild left neuroforaminal narrowing.      IMPRESSION:   CONCLUSION:  1. Mild to moderate spinal canal narrowing with effacement of the lateral recesses  bilaterally at L4-L5. Probable impingement of the bilateral traversing L5 nerve roots in the lateral recesses of L4-L5.   2. Effacement of the lateral recesses at L5-S1, left greater than right.  3. Mild to moderate left and mild right neuroforaminal narrowing at L4-L5.  4. Moderate bilateral facet arthropathy at L4-L5 and L5-S1.    Objective:   Constitutional: medium built. Not in acute distress.  Psychiatric:  Judgment and insight are intact.  Alert and oriented.  Mood and affect are appropriate.  HEAD: NC/AT  Muscloskeletal  Gait: ambulatory. normal gait.   Motor Volition:able to go from a sitting to standing position and sitting on the table without difficulities.  Lumbar Spine: Foot evertors 5/5, Foot invertors  5/5, Foot dorsiflexor  5/5, Foot plantarflexors  5/5, Leg extensors  5/5, Leg flexors  5/5, Hip abductor  5/5, Hip adductor  5/5. Straight leg Raise is negative. . Palpatory tenderness of the left L4-S1 paraspinal.    Hip: No tenderness of the right greater trochanter.  NEUROLOGIC:  Bakinski test is negative. Deep tendon reflexes of Patella 2/4 and Achilles  2/4 and symmetrical.  Right L5 and S1 dermatomes.  Muscle tone is normal.   LYMPHATIC: No groin adenopathy.   SKIN:  No lesion of the epidermis or subcutaneous tissue of the lumbar spine.  PULMONARY:  Effort is normal.  ABDOMEN: round habitus.

## 2021-06-09 NOTE — PROGRESS NOTES
"Law Cruz is a 55 y.o. female who is being evaluated via a billable telephone visit.      The patient has been notified of following:     \"This telephone visit will be conducted via a call between you and your physician/provider. We have found that certain health care needs can be provided without the need for a physical exam.  This service lets us provide the care you need with a short phone conversation.  If a prescription is necessary we can send it directly to your pharmacy.  If lab work is needed we can place an order for that and you can then stop by our lab to have the test done at a later time.    Telephone visits are billed at different rates depending on your insurance coverage. During this emergency period, for some insurers they may be billed the same as an in-person visit.  Please reach out to your insurance provider with any questions.    If during the course of the call the physician/provider feels a telephone visit is not appropriate, you will not be charged for this service.\"    Patient has given verbal consent to a Telephone visit? Yes    What phone number would you like to be contacted at? [phone]    Patient would like to receive their AVS by AVS Preference: Mail a copy.    Additional provider notes:     Rash on left arm for 2 years.  Hydrocortisone 2.5% has not been helping.    Assessment/Plan:  1. Dermatitis    - triamcinolone (KENALOG) 0.1 % cream; Apply 1 gm to arms 2 times daily  Dispense: 80 g; Refill: 5    Visit was conducted with professional Vizerra .    Phone call duration:  9 minutes    Campbell Honeycutt MD  "

## 2021-06-10 NOTE — PROGRESS NOTES
Assessment: /    Plan:    1. Acute cystitis without hematuria  nitrofurantoin, macrocrystal-monohydrate, (MACROBID) 100 MG capsule   2. Dysuria  Urinalysis-UC if Indicated    Culture, Urine       Recheck if not improving.  Patient was seen with [facility] , Andriy Arneas.      Subjective:    HPI:  Law Cruz is a 52-year-old female presenting for follow-up on back pain.  MRI in June 2016 demonstrated disc bulging and foraminal narrowing with possible impingement on nerve roots.  She was being seen at the spine care clinic.  She continues to have right sciatic symptoms.  Overall, her back pain has been less.    She also notes dysuria and urinary frequency for the past week.    Social Hx: Never smoker.    Review of Systems: No fever, nausea, vomiting.      Current Outpatient Prescriptions   Medication Sig Dispense Refill     acetaminophen (TYLENOL) 500 MG tablet Take 1 tablet (500 mg total) by mouth every 6 (six) hours as needed for pain. 100 tablet 3     gabapentin (NEURONTIN) 100 MG capsule Take 1 capsule in the afternoon and 1 at bedtime 60 capsule 5     hydrocortisone 2.5 % cream Apply to affected skin 2 times daily 30 g 5     nitrofurantoin, macrocrystal-monohydrate, (MACROBID) 100 MG capsule Take 1 capsule (100 mg total) by mouth 2 (two) times a day for 7 days. 14 capsule 0     No current facility-administered medications for this visit.          Objective:    Vitals:    04/24/17 1322   BP: 112/70   Pulse: 76   Resp: 17   Temp: 98.2  F (36.8  C)   SpO2: 98%       Gen:  NAD, VSS  Lungs:  normal  Heart:  normal  Abdomen:  No HSM, mass or tenderness  Back with no CVA tenderness  Skin without rash    UA is positive    ADDITIONAL HISTORY SUMMARIZED (2): None.  DECISION TO OBTAIN EXTRA INFORMATION (1): None.   RADIOLOGY TESTS (1): None.  LABS (1): UA.  MEDICINE TESTS (1): None.  INDEPENDENT REVIEW (2 each): None.     Total Data Points: 1

## 2021-06-13 NOTE — PROGRESS NOTES
Assessment: /    Plan:    1. Acute cystitis without hematuria     2. Chronic right-sided low back pain with right-sided sciatica  gabapentin (NEURONTIN) 100 MG capsule   3. Routine general medical examination at a health care facility  acetaminophen (TYLENOL) 500 MG tablet       Return in 2 weeks, check UA/UC then.  Continue gabapentin and Tylenol.  [facility]      Subjective:    HPI:  Law Cruz is a 52-year-old female presenting for follow-up of ER visit.  She was at [facility] on 9/5/17.  I reviewed the ER record and UA and EKG on care everywhere.  She had urinary frequency and felt dizzy from the pain.  UA demonstrated 9 WBCs per hpf, with moderate leukocyte esterase.  EKG was normal.  She was given 5 day course of nitrofurantoin.  Urine culture was not done.  Urinary frequency is slightly less.      Review of Systems: No fever, vomiting, diarrhea or constipation.      Current Outpatient Prescriptions   Medication Sig Dispense Refill     nitrofurantoin, macrocrystal-monohydrate, (MACROBID) 100 MG capsule   0     acetaminophen (TYLENOL) 500 MG tablet Take 1 tablet (500 mg total) by mouth every 6 (six) hours as needed for pain. 100 tablet 3     gabapentin (NEURONTIN) 100 MG capsule Take 1 capsule in the afternoon and 1 at bedtime 60 capsule 11     hydrocortisone 2.5 % cream Apply to affected skin 2 times daily 30 g 5     No current facility-administered medications for this visit.          Objective:    Vitals:    09/08/17 1138   BP: 114/74   Pulse: 73   Resp: 17   Temp: 98  F (36.7  C)   SpO2: 97%       Gen:  NAD, VSS  Lungs:  normal  Heart:  normal  Abdomen:  No HSM or mass.  Slight right suprapubic tenderness        ADDITIONAL HISTORY SUMMARIZED (2): Reviewed ER note.  DECISION TO OBTAIN EXTRA INFORMATION (1): None.   RADIOLOGY TESTS (1): None.  LABS (1): Reviewed UA.  MEDICINE TESTS (1): Reviewed EKG.  INDEPENDENT REVIEW (2 each): None.     Total Data Points: 4

## 2021-06-13 NOTE — PROGRESS NOTES
Assessment: /    Plan:    1. Acute cystitis without hematuria  doxycycline (VIBRA-TABS) 100 MG tablet   2. Burning sensation  Urinalysis-UC if Indicated    Culture, Urine   3. Pain  Urinalysis-UC if Indicated    Culture, Urine   4. Need for vaccination  Influenza, Seasonal,Quad Inj, 36+ MOS       Recheck if not improving.  Patient was seen with [facility] Maggie.      Subjective:    HPI:  Law Cruz is a 52-year-old female presenting with dysuria.  This has been occurring intermittently for the past month.      Review of Systems: No fever, nausea or vomiting.      Current Outpatient Prescriptions   Medication Sig Dispense Refill     acetaminophen (TYLENOL) 500 MG tablet Take 1 tablet (500 mg total) by mouth every 6 (six) hours as needed for pain. 100 tablet 3     gabapentin (NEURONTIN) 100 MG capsule Take 1 capsule in the afternoon and 1 at bedtime 60 capsule 11     ibuprofen (ADVIL,MOTRIN) 400 MG tablet Take 400 mg by mouth.       doxycycline (VIBRA-TABS) 100 MG tablet Take 1 tablet (100 mg total) by mouth 2 (two) times a day for 7 days. 14 tablet 0     hydrocortisone 2.5 % cream Apply to affected skin 2 times daily 30 g 5     No current facility-administered medications for this visit.          Objective:    Vitals:    09/22/17 1314   BP: 106/70   Pulse: 72   Resp: 16   Temp: 98.2  F (36.8  C)   SpO2: 98%       Gen:  NAD, VSS  Lungs:  normal  Heart:  normal  Abdomen:  No HSM, mass or tenderness  Back without CVA tenderness    UA with greater than 50 squamous epithelial cells per hpf, 10-25 WBCs per hpf      ADDITIONAL HISTORY SUMMARIZED (2): None.  DECISION TO OBTAIN EXTRA INFORMATION (1): None.   RADIOLOGY TESTS (1): None.  LABS (1): UA/UC.  MEDICINE TESTS (1): None.  INDEPENDENT REVIEW (2 each): None.     Total Data Points: 1

## 2021-06-13 NOTE — PROGRESS NOTES
Assessment: /    Plan:    1. Acute cystitis without hematuria     2. Immunization due  Influenza, Recombinant, Inj, Quadrivalent, PF, 18+YRS       Px in 1 year.      Subjective:    HPI:  Law Cruz is a 55-year-old female presenting for follow-up on ER visit 10/25/20 at [facility] for UTI.  Symptoms resolved.       Review of Systems:  No fever, cough, or dysuria.      Current Outpatient Medications   Medication Sig Dispense Refill     acetaminophen (TYLENOL) 500 MG tablet Take 1 tablet (500 mg total) by mouth every 6 (six) hours as needed for pain. 100 tablet 11     dextromethorphan-guaiFENesin (ROBITUSSIN-DM)  mg/5 mL liquid 10 ml 4 times daily as needed for cough 240 mL 3     gabapentin (NEURONTIN) 100 MG capsule Take 1 capsule at bedtime 30 capsule 11     hydrocortisone 2.5 % cream Apply to affected skin 2 times daily 30 g 5     ibuprofen (ADVIL,MOTRIN) 400 MG tablet Take 1 tablet (400 mg total) by mouth daily as needed for pain. 30 tablet 11     triamcinolone (KENALOG) 0.1 % cream Apply 1 gm to arms 2 times daily 80 g 5     No current facility-administered medications for this visit.          Objective:    Vitals:    11/27/20 1353   BP: 120/76   Pulse: 84   Resp: 16   Temp: 98.5  F (36.9  C)   SpO2: 97%       Gen:  NAD, VSS  Lungs:  normal  Heart:  normal  Abdomen:  No HSM, mass or tenderness        ADDITIONAL HISTORY SUMMARIZED (2): reviewed ER note.  DECISION TO OBTAIN EXTRA INFORMATION (1): None.   RADIOLOGY TESTS (1): None.  LABS (1): None.  MEDICINE TESTS (1): None.  INDEPENDENT REVIEW (2 each): None.     Total Data Points: 2

## 2021-06-14 NOTE — PROGRESS NOTES
"  Assessment & Plan      was seen today for leg pain and fatigue.    Diagnoses and all orders for this visit:    Chronic right-sided low back pain with right-sided sciatica  -     gabapentin (NEURONTIN) 100 MG capsule; Take 1 capsule at bedtime    Pain  -     acetaminophen (TYLENOL) 500 MG tablet; Take 1 tablet (500 mg total) by mouth every 6 (six) hours as needed for pain.    She will use heat for the back.               Tobacco Cessation:   reports that she has never smoked. Her smokeless tobacco use includes chew.    BMI:   Estimated body mass index is 26.47 kg/m  as calculated from the following:    Height as of this encounter: 5' 1.85\" (1.571 m).    Weight as of this encounter: 144 lb (65.3 kg).         Return in about 6 months (around 7/4/2021) for Back pain.    Campbell Honeycutt MD  [facility] DEREJE Hallman Anthony is 56 y.o. and presents to clinic today for the following health issues   HPI     She ran out of gabapentin and back pain has worsened without it.  She has not been applying hot or cold.    Current Outpatient Medications   Medication Sig Dispense Refill     acetaminophen (TYLENOL) 500 MG tablet Take 1 tablet (500 mg total) by mouth every 6 (six) hours as needed for pain. 100 tablet 11     dextromethorphan-guaiFENesin (ROBITUSSIN-DM)  mg/5 mL liquid 10 ml 4 times daily as needed for cough 240 mL 3     gabapentin (NEURONTIN) 100 MG capsule Take 1 capsule at bedtime 30 capsule 11     hydrocortisone 2.5 % cream Apply to affected skin 2 times daily 30 g 5     ibuprofen (ADVIL,MOTRIN) 400 MG tablet Take 1 tablet (400 mg total) by mouth daily as needed for pain. 30 tablet 11     triamcinolone (KENALOG) 0.1 % cream Apply 1 gm to arms 2 times daily 80 g 5     No current facility-administered medications for this visit.          Review of Systems  No fever or cough.      Objective    /64 (Patient Site: Left Arm, Patient Position: Sitting, Cuff Size: Adult Regular)  " " Pulse 74   Temp 98.4  F (36.9  C) (Oral)   Ht 5' 1.85\" (1.571 m)   Wt 144 lb (65.3 kg)   LMP 01/06/2020   SpO2 99%   BMI 26.47 kg/m    Body mass index is 26.47 kg/m .  Physical Exam  Heart: Normal  Lungs: Normal  Straight leg raise normal bilateral              "

## 2021-06-14 NOTE — PROGRESS NOTES
Assessment: /    Plan:    1. Chronic right-sided low back pain with right-sided sciatica     2. Nonintractable headache, unspecified chronicity pattern, unspecified headache type         Resume gabapentin.  Recheck in 2 months, or sooner if any problems.  Patient was seen with professional Vivian , Markos Richmond.      Subjective:    HPI:  Law Cruz is a 52-year-old female presenting with back pain and headache.  She has not been taking gabapentin since September.  Headache began 2 days ago, and is located on the left side.  Back pain is located in the right lumbar area.  A hot shower is slightly helpful.  She also indicates pain from the right flank to the right hip.  Pelvic ultrasound in 2015 was normal.      Review of Systems: No fever, sore throat, cough, dysuria.        Current Outpatient Prescriptions   Medication Sig Dispense Refill     acetaminophen (TYLENOL) 500 MG tablet Take 1 tablet (500 mg total) by mouth every 6 (six) hours as needed for pain. 100 tablet 3     gabapentin (NEURONTIN) 100 MG capsule Take 1 capsule in the afternoon and 1 at bedtime 60 capsule 11     hydrocortisone 2.5 % cream Apply to affected skin 2 times daily 30 g 5     No current facility-administered medications for this visit.          Objective:    Vitals:    11/20/17 1449   BP: 112/70   Pulse: 69   Resp: 17   Temp: 98.5  F (36.9  C)   SpO2: 98%       Gen:  NAD, VSS  Eyes normal  Ears normal  Throat normal  Neck supple without adenopathy.  There is tenderness of the posterior cervical muscles.  Lungs:  normal  Heart:  normal  Abdomen:  No HSM, mass or tenderness  Back with tenderness of the right lumbar paraspinal muscles.  Right straight leg raise is positive to the posterior knee.        ADDITIONAL HISTORY SUMMARIZED (2): None.  DECISION TO OBTAIN EXTRA INFORMATION (1): None.   RADIOLOGY TESTS (1): Reviewed pelvic ultrasound.  LABS (1): None.  MEDICINE TESTS (1): None.  INDEPENDENT REVIEW (2 each): None.     Total Data Points:  1

## 2021-06-15 NOTE — TELEPHONE ENCOUNTER
Per chart, patient is due for 6 month back pain f/u with PCP on approx 7/4/21 and mammo on 7//22/21.  Patient overdue for pap and Tetanus vaccination.    Calling to schedule.  No  available after long hold time.  Will call back later.    Patient agrees to schedule mammo and F/U visit.  Scheduled  4/23/21 appts:  PCP for back pain @9:20  and mammo @ 10:45.

## 2021-06-15 NOTE — PROGRESS NOTES
Assessment: /    Plan:    1. Chronic right-sided low back pain with right-sided sciatica  gabapentin (NEURONTIN) 100 MG capsule    ibuprofen (ADVIL,MOTRIN) 400 MG tablet       Take gabapentin 100 mg at bedtime.  Okay to take additional Tylenol.  Recheck in 2 months, or sooner if any problems.  Patient was seen with professional Anthony , Markos Richmond.      Subjective:    HPI:  Law Cruz is a 53-year-old female presenting with low back pain.  She has not been taking gabapentin recently.  She takes 1 Tylenol daily.       Review of Systems: No fever, cough, dysuria, leg weakness.      Current Outpatient Prescriptions   Medication Sig Dispense Refill     acetaminophen (TYLENOL) 500 MG tablet Take 1 tablet (500 mg total) by mouth every 6 (six) hours as needed for pain. 100 tablet 3     gabapentin (NEURONTIN) 100 MG capsule Take 1 capsule at bedtime 30 capsule 11     hydrocortisone 2.5 % cream Apply to affected skin 2 times daily 30 g 5     ibuprofen (ADVIL,MOTRIN) 400 MG tablet Take 1 tablet (400 mg total) by mouth daily as needed for pain. 30 tablet 11     No current facility-administered medications for this visit.          Objective:    Vitals:    01/29/18 1408   BP: 108/68   Pulse: 75   Resp: 17   Temp: 97.8  F (36.6  C)   SpO2: 98%       Gen:  NAD, VSS  Lungs:  normal  Heart:  normal  Back with tenderness of the right lumbar paraspinal muscles.  Straight leg raise normal bilateral        ADDITIONAL HISTORY SUMMARIZED (2): None.  DECISION TO OBTAIN EXTRA INFORMATION (1): None.   RADIOLOGY TESTS (1): None.  LABS (1): None.  MEDICINE TESTS (1): None.  INDEPENDENT REVIEW (2 each): None.     Total Data Points: 0

## 2021-06-16 PROBLEM — R41.3 MEMORY LOSS: Status: ACTIVE | Noted: 2018-04-09

## 2021-06-16 PROBLEM — J98.4 CALCIFIED GRANULOMA OF LUNG: Status: ACTIVE | Noted: 2019-09-14

## 2021-06-16 PROBLEM — D32.9 MENINGIOMA (H): Status: ACTIVE | Noted: 2019-09-14

## 2021-06-16 NOTE — TELEPHONE ENCOUNTER
Telephone Encounter by Scot Sky CMA at 12/19/2019 11:00 AM     Author: Scot Sky CMA Service: -- Author Type: Certified Medical Assistant    Filed: 12/19/2019 11:04 AM Encounter Date: 12/18/2019 Status: Signed    : Scot Sky CMA (Certified Medical Assistant)       Raj Hicks MD  You 19 hours ago (3:37 PM)      Patient will be applying the cream to her hands and wrists    Routing comment

## 2021-06-17 NOTE — PROGRESS NOTES
Assessment: /    Plan:    1. Chronic right-sided low back pain without sciatica         Take ibuprofen as needed.  She will return soon for citizenship waiver.  Patient was seen with professional Vivian , Markos Richmond.      Subjective:    HPI:  Law Cruz is a 53-year-old female presenting for follow-up on right low back pain.  She has not had any change.  She took one gabapentin in the morning and felt dizzy, therefore did not continue that.  She has not been taking ibuprofen.  Heat helps slightly.  She is not able to lie on her right side, therefore she sleeps on her left.    Social Hx: She came to the  about 10 years ago.    Review of Systems: No fever, cough, dysuria.      Current Outpatient Prescriptions   Medication Sig Dispense Refill     acetaminophen (TYLENOL) 500 MG tablet Take 1 tablet (500 mg total) by mouth every 6 (six) hours as needed for pain. 100 tablet 3     gabapentin (NEURONTIN) 100 MG capsule Take 1 capsule at bedtime 30 capsule 11     hydrocortisone 2.5 % cream Apply to affected skin 2 times daily 30 g 5     ibuprofen (ADVIL,MOTRIN) 400 MG tablet Take 1 tablet (400 mg total) by mouth daily as needed for pain. 30 tablet 11     No current facility-administered medications for this visit.          Objective:    Vitals:    03/30/18 1306   BP: 112/70   Pulse: 70   Temp: 97.5  F (36.4  C)   SpO2: 96%       Gen:  NAD, VSS  Lungs:  normal  Heart:  normal  Back with no midline lumbar spinal tenderness.  Straight leg raise normal bilateral.  Hip rotation is normal bilateral        ADDITIONAL HISTORY SUMMARIZED (2): None.  DECISION TO OBTAIN EXTRA INFORMATION (1): None.   RADIOLOGY TESTS (1): None.  LABS (1): None.  MEDICINE TESTS (1): None.  INDEPENDENT REVIEW (2 each): None.     Total Data Points: 0

## 2021-06-17 NOTE — PROGRESS NOTES
"    Assessment & Plan      was seen today for back pain.    Diagnoses and all orders for this visit:    Chronic right-sided low back pain with right-sided sciatica      Continue gabapentin.             BMI:   Estimated body mass index is 26.69 kg/m  as calculated from the following:    Height as of this encounter: 5' 2\" (1.575 m).    Weight as of this encounter: 145 lb 14.4 oz (66.2 kg).       Return in about 2 months (around 6/23/2021) for Annual physical.    Campbell Honeycutt MD  [facility] DERJEE Cruz is 56 y.o. and presents today for the following health issues   HPI     Right low back pain.    Not fasting.    Going to [place] in 2 weeks to visit family.    Had Pap in 2015.      Current Outpatient Medications   Medication Sig Dispense Refill     acetaminophen (TYLENOL) 500 MG tablet Take 1 tablet (500 mg total) by mouth every 6 (six) hours as needed for pain. 100 tablet 11     dextromethorphan-guaiFENesin (ROBITUSSIN-DM)  mg/5 mL liquid 10 ml 4 times daily as needed for cough 240 mL 3     gabapentin (NEURONTIN) 100 MG capsule Take 1 capsule at bedtime 30 capsule 11     hydrocortisone 2.5 % cream Apply to affected skin 2 times daily 30 g 5     ibuprofen (ADVIL,MOTRIN) 400 MG tablet Take 1 tablet (400 mg total) by mouth daily as needed for pain. 30 tablet 11     triamcinolone (KENALOG) 0.1 % cream Apply 1 gm to arms 2 times daily 80 g 5     No current facility-administered medications for this visit.          Review of Systems  No fever or cough.      Objective    /66 (Patient Site: Left Arm, Patient Position: Sitting, Cuff Size: Adult Regular)   Pulse 85   Temp 98.5  F (36.9  C) (Oral)   Ht 5' 2\" (1.575 m)   Wt 145 lb 14.4 oz (66.2 kg)   LMP 01/06/2020   SpO2 98%   BMI 26.69 kg/m    Body mass index is 26.69 kg/m .  Physical Exam  Heart normal  Lungs normal  Right straight leg raise positive to knee.              "

## 2021-06-17 NOTE — PROGRESS NOTES
Assessment: /    Plan:    1. Memory loss         I completed form [id number] and gave the original to the patient to take to immigration.  Patient was seen with professional [facility] , Markos Richmond.  This was a 27 minute visit with >50% of the time spent in counseling and coordination of care regarding: Memory loss.      Subjective:    HPI:  Law Cruz is a 53-year-old female presenting for citizenship waiver.  He has difficulty with memory.  She has forgotten a burner on in the past.  She came to the [place] in 2009.      Review of Systems: No fever or cough.      Current Outpatient Prescriptions   Medication Sig Dispense Refill     acetaminophen (TYLENOL) 500 MG tablet Take 1 tablet (500 mg total) by mouth every 6 (six) hours as needed for pain. 100 tablet 3     gabapentin (NEURONTIN) 100 MG capsule Take 1 capsule at bedtime 30 capsule 11     hydrocortisone 2.5 % cream Apply to affected skin 2 times daily 30 g 5     ibuprofen (ADVIL,MOTRIN) 400 MG tablet Take 1 tablet (400 mg total) by mouth daily as needed for pain. 30 tablet 11     No current facility-administered medications for this visit.          Objective:    Vitals:    04/09/18 1412   BP: 110/70   Pulse: 76   Resp: 17   Temp: 97.8  F (36.6  C)   SpO2: 98%       Gen:  NAD, VSS  Lungs:  normal  Heart:  normal  BIMS memory test: Score = 10/15, indicating moderate memory impairment.  She was unable to state the correct year.  She stated the correct month and day.  She recalled 3 of 3 items immediately.  She recalled 2 of 3 items remotely.        ADDITIONAL HISTORY SUMMARIZED (2): None.  DECISION TO OBTAIN EXTRA INFORMATION (1): None.   RADIOLOGY TESTS (1): None.  LABS (1): None.  MEDICINE TESTS (1): None.  INDEPENDENT REVIEW (2 each): None.     Total Data Points: 0

## 2021-06-18 NOTE — PROGRESS NOTES
Assessment: /    Plan:    1. Chronic right-sided low back pain with right-sided sciatica  Ambulatory referral to Spine Care       Patient was seen with professional Yany , Markos Richmond.      Subjective:    HPI:  Law Cruz is a 53-year-old female presenting for follow-up on low back pain.  MRI in June 2016 demonstrated a bulging disks and probable impingement of L5 nerve roots.  She states that her feet are painful after she sleeps or takes a nap.  If he takes gabapentin at bedtime, she is sleepy the entire next day.  She takes a dose twice per month.      Review of Systems: No fever or dysuria.      Current Outpatient Prescriptions   Medication Sig Dispense Refill     acetaminophen (TYLENOL) 500 MG tablet Take 1 tablet (500 mg total) by mouth every 6 (six) hours as needed for pain. 100 tablet 3     gabapentin (NEURONTIN) 100 MG capsule Take 1 capsule at bedtime 30 capsule 11     hydrocortisone 2.5 % cream Apply to affected skin 2 times daily 30 g 5     ibuprofen (ADVIL,MOTRIN) 400 MG tablet Take 1 tablet (400 mg total) by mouth daily as needed for pain. 30 tablet 11     No current facility-administered medications for this visit.          Objective:    Vitals:    05/11/18 1522   BP: 110/76   Pulse: 77   Resp: 17   Temp: 98.3  F (36.8  C)   SpO2: 99%       Gen:  NAD, VSS  Lungs:  normal  Heart:  normal  Straight leg raise normal bilateral        ADDITIONAL HISTORY SUMMARIZED (2): None.  DECISION TO OBTAIN EXTRA INFORMATION (1): None.   RADIOLOGY TESTS (1): None.  LABS (1): None.  MEDICINE TESTS (1): None.  INDEPENDENT REVIEW (2 each): None.     Total Data Points: 0

## 2021-06-19 NOTE — PROGRESS NOTES
Assessment and Plan      was seen today for dizziness.    Diagnoses and all orders for this visit:    Headache       HPI     Chief Complaint   Patient presents with     Dizziness     Started this AM, headache, fatigue, chills.        Law Cruz is a 53 y.o. female seen today for headache, chills, and dizziness starting about 2 - 2.5 hours ago while working around the house. Headache came on suddenly over 1-2 minutes and was accompanied by soreness/stiffness in the back of her neck.  She had to stop doing housework and sit down when it began.  Headache has persisted.  Headache is primarily left frontal and in front of left ear. No change in vision. No shortness of breath. No abdominal pain.    She's had multiple similar episodes, never this severe, most recently within the last 3 - 4 months.    She speaks only Karenni, interpretation provided by her son.      Current Outpatient Prescriptions:      acetaminophen (TYLENOL) 500 MG tablet, Take 1 tablet (500 mg total) by mouth every 6 (six) hours as needed for pain., Disp: 100 tablet, Rfl: 3     gabapentin (NEURONTIN) 100 MG capsule, Take 1 capsule at bedtime, Disp: 30 capsule, Rfl: 11     ibuprofen (ADVIL,MOTRIN) 400 MG tablet, Take 1 tablet (400 mg total) by mouth daily as needed for pain., Disp: 30 tablet, Rfl: 11     Reviewed and updated: medical history, medications and allergies.     Review of Systems     General: Acknowledges feeling chilled and fatigued today.  Cardiovascular: Denies chest pain, dyspnea on exertion.  Respiratory: Denies dyspnea, cough, wheezing.  GI: Denies nausea, vomiting, diarrhea, constipation.     Objective     Vitals:    06/29/18 1039   BP: 100/60   Pulse: 65   Resp: 14   Temp: 98.5  F (36.9  C)   TempSrc: Oral   SpO2: 96%   Weight: 142 lb (64.4 kg)        Reviewed vital signs.  General: Appears calm, comfortable. Answers questions quickly and appropriately with clear speech. No apparent distress.  Skin: Pink, warm, dry.  HENT:  Normocephalic, atraumatic.  Neck: No posterior midline tenderness.  She is exhibiting somewhat decreased ROM in the neck however it is unclear if this is due to pain or misunderstanding directions.  Cardiovascular: Regular rate and rhythm, clear S1/S2 without murmur, rub, or gallop.  Respiratory: Lung sounds are clear and equal bilaterally. Normal respiratory effort.  Neuro: Memory and cognition appear normal. Normal gait. Cranial nerves II - XII grossly intact.    Psych: Mood and affect appear normal.     Imaging:   No results found.    Labs:  No results found for this or any previous visit (from the past 24 hour(s)).     Medical Decision-Making     Law is generally well-appearing 53-year-old female who presents with headache and fatigue since this morning.  Headache came on abruptly while she was doing housework this morning, progressing from no pain to maximal pain within a minute or two.  She had to stop working and sit down due to the pain.  It was accompanied by some posterior neck pain and stiffness.  Her appearance is nontoxic.  She is not tachycardic, tachypnea, or febrile.  Neurologic exam is grossly normal, however there was some difficulty conveying directions via a non-professional .  Given the rapid onset of headache in the accompanying neck stiffness/soreness, I do have some concern for subarachnoid.  Rather than sending her to CT here where there is no medical staff present, it would be more appropriate for her to be worked up in the emergency department where adequate staff is present in case of rapid decompensation.  However as her symptoms have remained unchanged since the onset approximately 2-1/2 hours ago and she has no focal neurologic findings currently, I believe it is reasonable for travel the 1-1-1/2 blocks to the ED via personal vehicle.  Her son drove her here and agrees to drive her directly to the emergency department.  ED was contacted and report was given to an ED  provider.    Her son drove her to the clinic today and agrees to drive her directly to the emergency department.    Eugene Hager PA-C

## 2021-06-20 NOTE — LETTER
Letter by Raj Hicks MD at      Author: Raj Hicks MD Service: -- Author Type: --    Filed:  Encounter Date: 12/18/2019 Status: Signed         December 18, 2019     Patient: Law Cruz   YOB: 1965   Date of Visit: 12/18/2019       To Whom It May Concern:    Mckenna Cruz accompanied her mother to a doctor's appointment.  Please excuse her absence from work on 12/18/2019.     If you have any questions or concerns, please don't hesitate to call.    Sincerely,        Electronically signed by Raj Hicks MD

## 2021-06-29 ENCOUNTER — COMMUNICATION - HEALTHEAST (OUTPATIENT)
Dept: FAMILY MEDICINE | Facility: CLINIC | Age: 56
End: 2021-06-29

## 2021-07-04 NOTE — TELEPHONE ENCOUNTER
CMT called [facility] and updated pharmacist (Phylicia). The pharmacist verbalizes understanding of provider/CSS instructions for follow-up and continued care per provider message.

## 2021-07-04 NOTE — TELEPHONE ENCOUNTER
Reason for Call:  Other prescription     Detailed comments: [facility] sent a fax asking MD to contact pharmacy and clarify where the HC should be applied.     Chart review shows that MD noted a rash on both forearms. See below.     5. Rash  Both forearms.  - hydrocortisone with aloe 1 % Crea cream; Apply thin layer to affected area twice a day  Dispense: 56 g; Refill: 0  Next follow up:  No follow-ups on file.    Phone Number Patient can be reached at: Home number on file [phone] (home)    Best Time: ASAP    Can we leave a detailed message on this number?: No call back needed    Call taken on 6/29/2021 at 1:35 PM by Scot Sky

## 2021-07-05 ENCOUNTER — COMMUNICATION - HEALTHEAST (OUTPATIENT)
Dept: OBGYN | Facility: CLINIC | Age: 56
End: 2021-07-05

## 2021-07-22 NOTE — LETTER
Letter by Isabella Lucero RN at      Author: Isabella Lucero RN Service: -- Author Type: --    Filed:  Encounter Date: 7/5/2021 Status: (Other)         [facility]  [address]  [place] [address]             July 5, 2021         Dear Ms. Cruz,    We are happy to inform you that your recent Pap smear and Human Papillomavirus (HPV) test results are normal and negative.    It is recommended that you have your next Pap smear and Human Papillomavirus (HPV) test in 5 years. You will also need to return to the clinic every year for an annual wellness visit.    If you have additional questions regarding this result, please contact our office and we will be happy to assist you.      Sincerely,    Your [facility] Team

## 2021-08-17 ENCOUNTER — NURSE TRIAGE (OUTPATIENT)
Dept: NURSING | Facility: CLINIC | Age: 56
End: 2021-08-17

## 2021-08-17 NOTE — TELEPHONE ENCOUNTER
Pt is calling.    Dysuria, back pain. Urinary frequency.   No blood seen in the urine. Urine is not cloudy.   Denies fever, nausea, or vomiting.   Care advice reviewed. When to call back reviewed per care advice and she verbalized understanding.  Encouraged her to go to the [facility] or  now. Pt refused. Stated that she would like an appointment tomorrow in clinic. No appointments available until next week per scheduling.  I advised her that [facility]. This could turn into a kidney infection the longer she waits.  She verbalized understanding and agreed to go to urgent care now.    Reason for Disposition    Side (flank) or lower back pain present    Additional Information    Negative: Shock suspected (e.g., cold/pale/clammy skin, too weak to stand, low BP, rapid pulse)    Negative: Sounds like a life-threatening emergency to the triager    Negative: Unable to urinate (or only a few drops) and bladder feels very full    Negative: Vomiting    Negative: Patient sounds very sick or weak to the triager    Negative: SEVERE pain with urination    Negative: Fever > 100.4 F (38.0 C)    Protocols used: URINATION PAIN - FEMALE-A-OH    Geri Leos RN  [facility] Nurse Advisor  8/17/2021 at 4:09 PM

## 2021-08-24 NOTE — PROGRESS NOTES
Assessment: /    Plan:    1. Chronic right-sided low back pain with right-sided sciatica         Specialty  is assisting the patient to make an appointment at the spine clinic.  Follow-up appointment with me in 3 months, or sooner if any problems.  Patient was seen with Yany , Markos Richmond.      Subjective:    HPI:  Law Cruz is a 52-year-old female presenting for follow-up on back pain.  She takes gabapentin 0-2 times per week.  She states that her head feels heavy when she takes a dose.    She was going to the spine care clinic in the fall, and was lost to follow-up.       Review of Systems:  No fever or dysuria.      Current Outpatient Prescriptions   Medication Sig Dispense Refill     acetaminophen (TYLENOL) 500 MG tablet Take 1 tablet (500 mg total) by mouth every 6 (six) hours as needed for pain. 100 tablet 3     gabapentin (NEURONTIN) 100 MG capsule Take 1 capsule in the afternoon and 1 at bedtime 60 capsule 5     hydrocortisone 2.5 % cream Apply to affected skin 2 times daily 30 g 5     No current facility-administered medications for this visit.          Objective:    Vitals:    01/24/17 1511   BP: 106/76   Pulse: 82   Resp: 20   Temp: 98  F (36.7  C)       Gen:  NAD, VSS  Lungs:  normal  Heart:  normal          ADDITIONAL HISTORY SUMMARIZED (2): Reviewed 10/13/16 spine clinic note by Trino Chen.  DECISION TO OBTAIN EXTRA INFORMATION (1): None.   RADIOLOGY TESTS (1): None.  LABS (1): None.  MEDICINE TESTS (1): None.  INDEPENDENT REVIEW (2 each): None.     Total Data Points: 2     Vital Signs: I have reviewed the initial vital signs.  Constitutional: well-nourished, appears stated age, no acute distress  Head: atraumatic and normocephalic  Eyes:PERRLA, EOMI, clear conjunctiva  ENT: TM b/l clear, no sinus tenderness to percussion MMM  Cardiovascular: regular rate, regular rhythm, well-perfused extremities  Respiratory: unlabored respiratory effort, clear to auscultation bilaterally  Gastrointestinal: soft, non-tender abdomen, no pulsatile mass  Neuro: awake, alert, follows commands, oriented, no focal deficits,   ;

## 2021-12-17 ENCOUNTER — OFFICE VISIT (OUTPATIENT)
Dept: FAMILY MEDICINE | Facility: CLINIC | Age: 56
End: 2021-12-17
Payer: COMMERCIAL

## 2021-12-17 VITALS
TEMPERATURE: 96.9 F | HEART RATE: 77 BPM | OXYGEN SATURATION: 99 % | RESPIRATION RATE: 18 BRPM | SYSTOLIC BLOOD PRESSURE: 128 MMHG | DIASTOLIC BLOOD PRESSURE: 87 MMHG

## 2021-12-17 DIAGNOSIS — N30.00 ACUTE CYSTITIS WITHOUT HEMATURIA: ICD-10-CM

## 2021-12-17 DIAGNOSIS — R30.0 DYSURIA: Primary | ICD-10-CM

## 2021-12-17 LAB
ALBUMIN UR-MCNC: NEGATIVE MG/DL
APPEARANCE UR: CLEAR
BACTERIA #/AREA URNS HPF: ABNORMAL /HPF
BILIRUB UR QL STRIP: NEGATIVE
COLOR UR AUTO: YELLOW
GLUCOSE UR STRIP-MCNC: NEGATIVE MG/DL
HGB UR QL STRIP: NEGATIVE
KETONES UR STRIP-MCNC: NEGATIVE MG/DL
LEUKOCYTE ESTERASE UR QL STRIP: ABNORMAL
NITRATE UR QL: NEGATIVE
PH UR STRIP: 6 [PH] (ref 5–8)
RBC #/AREA URNS AUTO: ABNORMAL /HPF
SP GR UR STRIP: 1.01 (ref 1–1.03)
SQUAMOUS #/AREA URNS AUTO: ABNORMAL /LPF
UROBILINOGEN UR STRIP-ACNC: 0.2 E.U./DL
WBC #/AREA URNS AUTO: ABNORMAL /HPF

## 2021-12-17 PROCEDURE — 87086 URINE CULTURE/COLONY COUNT: CPT | Performed by: INTERNAL MEDICINE

## 2021-12-17 PROCEDURE — 81001 URINALYSIS AUTO W/SCOPE: CPT | Performed by: INTERNAL MEDICINE

## 2021-12-17 PROCEDURE — 99213 OFFICE O/P EST LOW 20 MIN: CPT | Performed by: INTERNAL MEDICINE

## 2021-12-17 RX ORDER — CEPHALEXIN 500 MG/1
500 CAPSULE ORAL 3 TIMES DAILY
Qty: 21 CAPSULE | Refills: 0 | Status: SHIPPED | OUTPATIENT
Start: 2021-12-17 | End: 2021-12-24

## 2021-12-17 NOTE — PROGRESS NOTES
Assessment & Plan     Dysuria  - UA macro with reflex to Microscopic and Culture - Clinc Collect  - Urine Microscopic    Acute cystitis without hematuria  With possible early pyelo based on back pain. Send UC, does not appear septic right now, discussed warning signs for recheck. No history of nephrolithiasis   - Urine Culture Aerobic Bacterial; Future  - cephALEXin (KEFLEX) 500 MG capsule; Take 1 capsule (500 mg) by mouth 3 times daily for 7 days    Patient Instructions   Take an antibiotic called keflex three times per day for 7 days.     Continue to push fluids.    We will send the urine for a culture as well.    Recheck if worsening or not improving.    Leopoldo Gorman MD      Patient Education     Bladder Infection, Female (Adult)     Urine normally doesn't have any germs (bacteria) in it. But bacteria can get into the urinary tract from the skin around the rectum. Or they can travel in the blood from other parts of the body. Once they are in your urinary tract, they can cause infection in these areas:    The urethra (urethritis)    The bladder (cystitis)    The kidneys (pyelonephritis)  The most common place for an infection is in the bladder. This is called a bladder infection. This is one of the most common infections in women. Most bladder infections are easily treated. They are not serious unless the infection spreads to the kidney.  The terms bladder infection, UTI, and cystitis are often used to describe the same thing. But they are not always the same. Cystitis is an inflammation of the bladder. The most common cause of cystitis is an infection.  Symptoms  The infection causes inflammation in the urethra and bladder. This causes many of the symptoms. The most common symptoms of a bladder infection are:    Pain or burning when urinating    Having to urinate more often than normal    Urgent need to urinate    Only a small amount of urine comes out    Blood in urine    Belly (abdominal) discomfort. This is  often in the lower belly above the pubic bone.    Cloudy urine    Strong- or bad-smelling urine    Unable to urinate (urinary retention)    Unable to hold urine in (urinary incontinence)    Fever    Loss of appetite    Confusion (in older adults)  Causes  Bladder infections are not contagious. You can't get one from someone else, from a toilet seat, or from sharing a bath.  The most common cause of bladder infections is bacteria from the bowels. The bacteria get onto the skin around the opening of the urethra. From there, they can get into the urine. Then they travel up to the bladder, causing inflammation and infection. This often happens because of:    Wiping incorrectly after urinating. Always wipe from front to back.    Bowel incontinence    Pregnancy    Procedures such as having a catheter put in    Older age    Not emptying your bladder. This can give bacteria a chance to grow in your urine.    Fluid loss (dehydration)    Constipation    Having sex    Using a diaphragm for birth control   Treatment  Bladder infections are diagnosed by a urine test and urine culture. They are treated with antibiotics. They often clear up quickly without problems. Treatment helps prevent a more serious kidney infection.  Medicines  Medicines can help in the treatment of a bladder infection:    Take antibiotics until they are used up, even if you feel better. It's important to finish them to make sure the infection has cleared.    You can use acetaminophen or ibuprofen for pain, fever, or discomfort, unless another medicine was prescribed. If you have long-term (chronic) liver or kidney disease, talk with your healthcare provider before using these medicines. Also talk with your provider if you've ever had a stomach ulcer or GI (gastrointestinal) bleeding, or are taking blood-thinner medicines.    If you are given phenazopydridine to reduce burning with urination, it will make your urine a bright orange color. This can stain  clothing.  Care and prevention  These self-care steps can help prevent future infections:    Drink plenty of fluids. This helps to prevent dehydration and flush out your bladder. Do this unless you must restrict fluids for other health reasons, or your healthcare provider told you not to.    Clean yourself correctly after going to the bathroom. Wipe from front to back after using the toilet. This helps prevent the spread of bacteria.    Urinate more often. Don't try to hold urine in for a long time.    Wear loose-fitting clothes and cotton underwear. Don't wear tight-fitting pants.    Improve your diet and prevent constipation. Eat more fresh fruits and vegetables, and fiber. Eat less junk foods and fatty foods.    Don't have sex until your symptoms are gone.    Don't have caffeine, alcohol, and spicy foods. These can irritate your bladder.    Urinate right after you have sex to flush out your bladder.    If you use birth control pills and have frequent bladder infections, discuss it with your healthcare provider.  Follow-up care  Call your healthcare provider if all symptoms are not gone after 3 days of treatment. This is especially important if you have repeat infections.  If a culture was done, you will be told if your treatment needs to be changed. If directed, you can call to find out the results.  If X-rays were done, you will be told if the results will affect your treatment.  Call 911  Call 911 if any of the following occur:    Trouble breathing    Hard to wake up or confusion    Fainting (loss of consciousness)    Fast heart rate  When to get medical advice  Call your healthcare provider right away if any of these occur:    Fever of 100.4 F (38.0 C) or higher, or as directed by your healthcare provider    Symptoms are not better after 3 days of treatment    Back or belly pain that gets worse    Repeated vomiting, or unable to keep medicine down    Weakness or dizziness    Vaginal discharge    Pain, redness,  "or swelling in the outer vaginal area (labia)  [employer] last reviewed this educational content on 11/1/2019 2000-2021 [employer]. All rights reserved. This information is not intended as a substitute for professional medical care. Always follow your healthcare professional's instructions.                        Tobacco Cessation:   reports that she has never smoked. Her smokeless tobacco use includes chew.      BMI:   Estimated body mass index is 26.72 kg/m  as calculated from the following:    Height as of 6/24/21: 1.575 m (5' 2\").    Weight as of 6/24/21: 66.3 kg (146 lb 1 oz).       See Patient Instructions    No follow-ups on file.    Leopoldo Gorman MD  [facility]   Law is a 56 year old who presents for the following health issues urinary symptoms. Declines  and would like son to interpret.     Started having symptoms yesterday - happens about once per year.     Feeling chills.     No hematuria.    No history of stones.     Gettings UTIs about once per year.     Notes more on the left side- along the back. Normal bowel movements without constipation.     Stomach feels pain in the lower abdomen.     Pain can hurt the in the back more at times.     History of right sided back pain. Took some medication for her back- unsure what type.     Has been able to drink fluids- food has been ok as well.     Gets some headache with the pain as well on the left side.     Review of Systems   Constitutional, HEENT, cardiovascular, pulmonary, gi and gu systems are negative, except as otherwise noted.      Objective    /87   Pulse 77   Temp 96.9  F (36.1  C) (Tympanic)   Resp 18   SpO2 99%   There is no height or weight on file to calculate BMI.  Physical Exam   General: alert, interactive, NAD  HEENT: sclerae clear   Neck: supple  CV: RRR, no m/r/c  Resp: clear, no wheezing, easy work of breathing  Abdomen: +bs, tender in suprapubic area, no " guarding or mass noted, possible slight left CVA tenderness, no rashes noted, no deep RLQ or LLQ tenderness  Ext: warm and well perfused,  no edema  Psych: appropriate affect  Neuro: no focal deficits noted.        Results for orders placed or performed in visit on 12/17/21   UA macro with reflex to Microscopic and Culture - Clinc Collect     Status: Abnormal    Specimen: Urine, Midstream   Result Value Ref Range    Color Urine Yellow Colorless, Straw, Light Yellow, Yellow    Appearance Urine Clear Clear    Glucose Urine Negative Negative mg/dL    Bilirubin Urine Negative Negative    Ketones Urine Negative Negative mg/dL    Specific Gravity Urine 1.010 1.005 - 1.030    Blood Urine Negative Negative    pH Urine 6.0 5.0 - 8.0    Protein Albumin Urine Negative Negative mg/dL    Urobilinogen Urine 0.2 0.2, 1.0 E.U./dL    Nitrite Urine Negative Negative    Leukocyte Esterase Urine Small (A) Negative   Urine Microscopic     Status: Abnormal   Result Value Ref Range    Bacteria Urine Few (A) None Seen /HPF    RBC Urine 0-2 0-2 /HPF /HPF    WBC Urine 5-10 (A) 0-5 /HPF /HPF    Squamous Epithelials Urine Few (A) None Seen /LPF    Narrative    Urine Culture not indicated

## 2021-12-17 NOTE — PATIENT INSTRUCTIONS
Take an antibiotic called keflex three times per day for 7 days.     Continue to push fluids.    We will send the urine for a culture as well.    Recheck if worsening or not improving.    Leopoldo Gorman MD      Patient Education     Bladder Infection, Female (Adult)     Urine normally doesn't have any germs (bacteria) in it. But bacteria can get into the urinary tract from the skin around the rectum. Or they can travel in the blood from other parts of the body. Once they are in your urinary tract, they can cause infection in these areas:    The urethra (urethritis)    The bladder (cystitis)    The kidneys (pyelonephritis)  The most common place for an infection is in the bladder. This is called a bladder infection. This is one of the most common infections in women. Most bladder infections are easily treated. They are not serious unless the infection spreads to the kidney.  The terms bladder infection, UTI, and cystitis are often used to describe the same thing. But they are not always the same. Cystitis is an inflammation of the bladder. The most common cause of cystitis is an infection.  Symptoms  The infection causes inflammation in the urethra and bladder. This causes many of the symptoms. The most common symptoms of a bladder infection are:    Pain or burning when urinating    Having to urinate more often than normal    Urgent need to urinate    Only a small amount of urine comes out    Blood in urine    Belly (abdominal) discomfort. This is often in the lower belly above the pubic bone.    Cloudy urine    Strong- or bad-smelling urine    Unable to urinate (urinary retention)    Unable to hold urine in (urinary incontinence)    Fever    Loss of appetite    Confusion (in older adults)  Causes  Bladder infections are not contagious. You can't get one from someone else, from a toilet seat, or from sharing a bath.  The most common cause of bladder infections is bacteria from the bowels. The bacteria get onto the skin  around the opening of the urethra. From there, they can get into the urine. Then they travel up to the bladder, causing inflammation and infection. This often happens because of:    Wiping incorrectly after urinating. Always wipe from front to back.    Bowel incontinence    Pregnancy    Procedures such as having a catheter put in    Older age    Not emptying your bladder. This can give bacteria a chance to grow in your urine.    Fluid loss (dehydration)    Constipation    Having sex    Using a diaphragm for birth control   Treatment  Bladder infections are diagnosed by a urine test and urine culture. They are treated with antibiotics. They often clear up quickly without problems. Treatment helps prevent a more serious kidney infection.  Medicines  Medicines can help in the treatment of a bladder infection:    Take antibiotics until they are used up, even if you feel better. It's important to finish them to make sure the infection has cleared.    You can use acetaminophen or ibuprofen for pain, fever, or discomfort, unless another medicine was prescribed. If you have long-term (chronic) liver or kidney disease, talk with your healthcare provider before using these medicines. Also talk with your provider if you've ever had a stomach ulcer or GI (gastrointestinal) bleeding, or are taking blood-thinner medicines.    If you are given phenazopydridine to reduce burning with urination, it will make your urine a bright orange color. This can stain clothing.  Care and prevention  These self-care steps can help prevent future infections:    Drink plenty of fluids. This helps to prevent dehydration and flush out your bladder. Do this unless you must restrict fluids for other health reasons, or your healthcare provider told you not to.    Clean yourself correctly after going to the bathroom. Wipe from front to back after using the toilet. This helps prevent the spread of bacteria.    Urinate more often. Don't try to hold urine  in for a long time.    Wear loose-fitting clothes and cotton underwear. Don't wear tight-fitting pants.    Improve your diet and prevent constipation. Eat more fresh fruits and vegetables, and fiber. Eat less junk foods and fatty foods.    Don't have sex until your symptoms are gone.    Don't have caffeine, alcohol, and spicy foods. These can irritate your bladder.    Urinate right after you have sex to flush out your bladder.    If you use birth control pills and have frequent bladder infections, discuss it with your healthcare provider.  Follow-up care  Call your healthcare provider if all symptoms are not gone after 3 days of treatment. This is especially important if you have repeat infections.  If a culture was done, you will be told if your treatment needs to be changed. If directed, you can call to find out the results.  If X-rays were done, you will be told if the results will affect your treatment.  Call 911  Call 911 if any of the following occur:    Trouble breathing    Hard to wake up or confusion    Fainting (loss of consciousness)    Fast heart rate  When to get medical advice  Call your healthcare provider right away if any of these occur:    Fever of 100.4 F (38.0 C) or higher, or as directed by your healthcare provider    Symptoms are not better after 3 days of treatment    Back or belly pain that gets worse    Repeated vomiting, or unable to keep medicine down    Weakness or dizziness    Vaginal discharge    Pain, redness, or swelling in the outer vaginal area (labia)  [employer] last reviewed this educational content on 11/1/2019 2000-2021 [employer]. All rights reserved. This information is not intended as a substitute for professional medical care. Always follow your healthcare professional's instructions.

## 2021-12-18 LAB — BACTERIA UR CULT: NO GROWTH

## 2022-01-13 ENCOUNTER — OFFICE VISIT (OUTPATIENT)
Dept: FAMILY MEDICINE | Facility: CLINIC | Age: 57
End: 2022-01-13
Payer: COMMERCIAL

## 2022-01-13 VITALS
HEIGHT: 62 IN | SYSTOLIC BLOOD PRESSURE: 120 MMHG | BODY MASS INDEX: 26.31 KG/M2 | DIASTOLIC BLOOD PRESSURE: 80 MMHG | HEART RATE: 80 BPM | RESPIRATION RATE: 12 BRPM | TEMPERATURE: 98.4 F | OXYGEN SATURATION: 98 % | WEIGHT: 143 LBS

## 2022-01-13 DIAGNOSIS — R30.0 DYSURIA: Primary | ICD-10-CM

## 2022-01-13 DIAGNOSIS — Z28.39 IMMUNIZATION DEFICIENCY: ICD-10-CM

## 2022-01-13 DIAGNOSIS — L30.9 DERMATITIS: ICD-10-CM

## 2022-01-13 LAB
ALBUMIN UR-MCNC: 30 MG/DL
APPEARANCE UR: ABNORMAL
BACTERIA #/AREA URNS HPF: ABNORMAL /HPF
BILIRUB UR QL STRIP: NEGATIVE
COLOR UR AUTO: YELLOW
GLUCOSE UR STRIP-MCNC: NEGATIVE MG/DL
HGB UR QL STRIP: NEGATIVE
KETONES UR STRIP-MCNC: NEGATIVE MG/DL
LEUKOCYTE ESTERASE UR QL STRIP: ABNORMAL
MUCOUS THREADS #/AREA URNS LPF: PRESENT /LPF
NITRATE UR QL: NEGATIVE
PH UR STRIP: 5.5 [PH] (ref 5–7)
RBC #/AREA URNS AUTO: ABNORMAL /HPF
SP GR UR STRIP: >=1.03 (ref 1–1.03)
SQUAMOUS #/AREA URNS AUTO: ABNORMAL /LPF
TRANS CELLS #/AREA URNS HPF: ABNORMAL /HPF
UROBILINOGEN UR STRIP-ACNC: 0.2 E.U./DL
WBC #/AREA URNS AUTO: ABNORMAL /HPF

## 2022-01-13 PROCEDURE — 90686 IIV4 VACC NO PRSV 0.5 ML IM: CPT | Performed by: FAMILY MEDICINE

## 2022-01-13 PROCEDURE — 81001 URINALYSIS AUTO W/SCOPE: CPT | Performed by: FAMILY MEDICINE

## 2022-01-13 PROCEDURE — 99213 OFFICE O/P EST LOW 20 MIN: CPT | Mod: 25 | Performed by: FAMILY MEDICINE

## 2022-01-13 PROCEDURE — 87086 URINE CULTURE/COLONY COUNT: CPT | Performed by: FAMILY MEDICINE

## 2022-01-13 PROCEDURE — 90471 IMMUNIZATION ADMIN: CPT | Performed by: FAMILY MEDICINE

## 2022-01-13 RX ORDER — TRIAMCINOLONE ACETONIDE 1 MG/G
CREAM TOPICAL
Qty: 80 G | Refills: 3 | Status: SHIPPED | OUTPATIENT
Start: 2022-01-13 | End: 2023-06-07

## 2022-01-13 ASSESSMENT — MIFFLIN-ST. JEOR: SCORE: 1191.89

## 2022-01-13 NOTE — PROGRESS NOTES
ASSESMENT AND PLAN:  Diagnoses and all orders for this visit:  Dysuria  Reviewed her most recent urine culture and associated clinic note with the patient.  She did complete the course of prescribed antibiotics for presumed UTI.  However, her urine culture ended up being negative.  Now with a recurrence of symptoms, counseled her that I would like her to wait on a urine culture before deciding on treatment.  If the urine culture is negative it again then she may need to be evaluated further for other causes of dysuria if the symptoms persist.  We will call the patient with her final culture results on Monday with the help of a .  In the meantime, aggressive oral hydration.  -     UA with Microscopic reflex to Culture  -     Urine Microscopic  -     Urine Culture  Dermatitis  Refills  -     triamcinolone (KENALOG) 0.1 % external cream; [TRIAMCINOLONE (KENALOG) 0.1 % CREAM] Apply 1 gm to arms 2 times daily  -     hydrocortisone-aloe 1 % CREA; [HYDROCORTISONE WITH ALOE 1 % CREA CREAM] Apply thin layer to affected area twice a day  Immunization deficiency  Immunization review and counseling.  -     INFLUENZA VACCINE IM >6 MO VALENT IIV4 (ALFURIA/FLUZONE)      Reviewed the risks and benefits of the treatment plan with the patient and/or caregiver and we discussed indications for routine and emergent follow-up.        SUBJECTIVE: 57-year-old female reports that for the last 20 years about once per year she gets a urinary tract infection with pain and burning with urination.  Last month she had these symptoms arise and was evaluated and had treatment with antibiotics based on her symptoms and urinalysis results.  However, the final urine culture came back negative.  She has not been seeing any blood in the urine.  No fevers.  No vomiting or nausea.  No new sexual partners.  No vaginal itching or irritation or out of the ordinary discharge.  Patient needs refills on her medication creams that she uses for her  "rash.  Otherwise, she has been doing well.    Past Medical History:   Diagnosis Date     Chest pain      Colitis      Depression      Irregular heart rate      Perforated appendix 03/11/2014    In [place]     SOB (shortness of breath)      Patient Active Problem List   Diagnosis     Carpal Tunnel Syndrome     Anemia     Backache     Right-sided low back pain with right-sided sciatica     H. pylori infection     Memory loss     Meningioma (H)     Calcified granuloma of lung (H)     Current Outpatient Medications   Medication Sig Dispense Refill     acetaminophen (TYLENOL) 500 MG tablet [ACETAMINOPHEN (TYLENOL) 500 MG TABLET] Take 1 tablet (500 mg total) by mouth every 6 (six) hours as needed for pain. 100 tablet 11     hydrocortisone-aloe 1 % CREA [HYDROCORTISONE WITH ALOE 1 % CREA CREAM] Apply thin layer to affected area twice a day 56 g 3     triamcinolone (KENALOG) 0.1 % external cream [TRIAMCINOLONE (KENALOG) 0.1 % CREAM] Apply 1 gm to arms 2 times daily 80 g 3     dextromethorphan-guaiFENesin (ROBITUSSIN-DM)  mg/5 mL liquid [DEXTROMETHORPHAN-GUAIFENESIN (ROBITUSSIN-DM)  MG/5 ML LIQUID] 10 ml 4 times daily as needed for cough (Patient not taking: Reported on 12/17/2021) 240 mL 3     gabapentin (NEURONTIN) 100 MG capsule [GABAPENTIN (NEURONTIN) 100 MG CAPSULE] Take 1 capsule at bedtime (Patient not taking: Reported on 1/13/2022) 30 capsule 11     ibuprofen (ADVIL,MOTRIN) 400 MG tablet [IBUPROFEN (ADVIL,MOTRIN) 400 MG TABLET] Take 1 tablet (400 mg total) by mouth daily as needed for pain. (Patient not taking: Reported on 12/17/2021) 30 tablet 11     History   Smoking Status     Never Smoker   Smokeless Tobacco     Current User     Types: Chew     Comment: [employer] w/ tobacco       OBJECTICE: /80   Pulse 80   Temp 98.4  F (36.9  C) (Oral)   Resp 12   Ht 1.575 m (5' 2\")   Wt 64.9 kg (143 lb)   SpO2 98%   BMI 26.16 kg/m       Recent Results (from the past 24 hour(s))   UA with Microscopic " reflex to Culture    Collection Time: 01/13/22 10:13 AM    Specimen: Urine, Clean Catch   Result Value Ref Range    Color Urine Yellow Colorless, Straw, Light Yellow, Yellow    Appearance Urine Slightly Cloudy (A) Clear    Glucose Urine Negative Negative mg/dL    Bilirubin Urine Negative Negative    Ketones Urine Negative Negative mg/dL    Specific Gravity Urine >=1.030 1.005 - 1.030    Blood Urine Negative Negative    pH Urine 5.5 5.0 - 7.0    Protein Albumin Urine 30  (A) Negative mg/dL    Urobilinogen Urine 0.2 0.2, 1.0 E.U./dL    Nitrite Urine Negative Negative    Leukocyte Esterase Urine Small (A) Negative   Urine Microscopic    Collection Time: 01/13/22 10:13 AM   Result Value Ref Range    Bacteria Urine Many (A) None Seen /HPF    RBC Urine None Seen 0-2 /HPF /HPF    WBC Urine 10-25 (A) 0-5 /HPF /HPF    Squamous Epithelials Urine Many (A) None Seen /LPF    Transitional Epithelials Urine Few (A) None Seen /HPF    Mucus Urine Present (A) None Seen /LPF        GEN-alert, appropriate, in no apparent distress   CV-regular rate and rhythm with no murmur   RESP-lungs clear to auscultation    ABDOMINAL-soft, she has some mild suprapubic tenderness to deep palpation, no palpable mass, no guarding or rebound.  No CVA tenderness.       Sundar Salazar MD

## 2022-01-14 LAB — BACTERIA UR CULT: NORMAL

## 2022-03-11 ENCOUNTER — OFFICE VISIT (OUTPATIENT)
Dept: FAMILY MEDICINE | Facility: CLINIC | Age: 57
End: 2022-03-11
Payer: COMMERCIAL

## 2022-03-11 ENCOUNTER — TELEPHONE (OUTPATIENT)
Dept: FAMILY MEDICINE | Facility: CLINIC | Age: 57
End: 2022-03-11

## 2022-03-11 VITALS
TEMPERATURE: 98.3 F | DIASTOLIC BLOOD PRESSURE: 74 MMHG | SYSTOLIC BLOOD PRESSURE: 116 MMHG | OXYGEN SATURATION: 95 % | BODY MASS INDEX: 27.44 KG/M2 | WEIGHT: 150 LBS | HEART RATE: 73 BPM

## 2022-03-11 DIAGNOSIS — G89.29 CHRONIC RIGHT-SIDED LOW BACK PAIN WITH RIGHT-SIDED SCIATICA: Primary | ICD-10-CM

## 2022-03-11 DIAGNOSIS — Z23 HIGH PRIORITY FOR 2019 NOVEL CORONAVIRUS VACCINATION: ICD-10-CM

## 2022-03-11 DIAGNOSIS — R30.0 DYSURIA: ICD-10-CM

## 2022-03-11 DIAGNOSIS — M54.41 CHRONIC RIGHT-SIDED LOW BACK PAIN WITH RIGHT-SIDED SCIATICA: Primary | ICD-10-CM

## 2022-03-11 LAB
ALBUMIN UR-MCNC: NEGATIVE MG/DL
APPEARANCE UR: CLEAR
BACTERIA #/AREA URNS HPF: ABNORMAL /HPF
BILIRUB UR QL STRIP: NEGATIVE
COLOR UR AUTO: YELLOW
GLUCOSE UR STRIP-MCNC: NEGATIVE MG/DL
HGB UR QL STRIP: NEGATIVE
HYALINE CASTS #/AREA URNS LPF: ABNORMAL /LPF
KETONES UR STRIP-MCNC: NEGATIVE MG/DL
LEUKOCYTE ESTERASE UR QL STRIP: ABNORMAL
NITRATE UR QL: NEGATIVE
PH UR STRIP: 5.5 [PH] (ref 5–7)
RBC #/AREA URNS AUTO: ABNORMAL /HPF
SP GR UR STRIP: 1.01 (ref 1–1.03)
SQUAMOUS #/AREA URNS AUTO: ABNORMAL /LPF
TRANS CELLS #/AREA URNS HPF: ABNORMAL /HPF
UROBILINOGEN UR STRIP-ACNC: 0.2 E.U./DL
WBC #/AREA URNS AUTO: ABNORMAL /HPF

## 2022-03-11 PROCEDURE — 91306 COVID-19,PF,MODERNA (18+ YRS BOOSTER .25ML): CPT | Performed by: FAMILY MEDICINE

## 2022-03-11 PROCEDURE — 99214 OFFICE O/P EST MOD 30 MIN: CPT | Performed by: FAMILY MEDICINE

## 2022-03-11 PROCEDURE — 81001 URINALYSIS AUTO W/SCOPE: CPT | Performed by: FAMILY MEDICINE

## 2022-03-11 PROCEDURE — 0064A COVID-19,PF,MODERNA (18+ YRS BOOSTER .25ML): CPT | Performed by: FAMILY MEDICINE

## 2022-03-11 RX ORDER — GABAPENTIN 100 MG/1
CAPSULE ORAL
Qty: 30 CAPSULE | Refills: 11 | Status: SHIPPED | OUTPATIENT
Start: 2022-03-11 | End: 2022-12-23

## 2022-03-11 NOTE — TELEPHONE ENCOUNTER
Spoke with family as leaving for appointment.  Informed them that it is too early for physical but patient can be seen today as office visit.  Patient agreed and requests COVID vaccine.

## 2022-03-16 NOTE — PROGRESS NOTES
"  Assessment & Plan     Chronic right-sided low back pain with right-sided sciatica    Continue gabapentin.    - gabapentin (NEURONTIN) 100 MG capsule  Dispense: 30 capsule; Refill: 11    Dysuria    - UA Macro with Reflex to Micro and Culture - lab collect  - UA Macro with Reflex to Micro and Culture - lab collect  - Urine Microscopic    High priority for 2019 novel coronavirus vaccination    - COVID-19,PF,MODERNA (18+ YRS BOOSTER .25ML)        33 minutes spent on the date of the encounter doing chart review, review of test results and patient visit regarding back pain, dysuria.       Tobacco Cessation:   reports that she has never smoked. Her smokeless tobacco use includes chew.      BMI:   Estimated body mass index is 27.44 kg/m  as calculated from the following:    Height as of 1/13/22: 1.575 m (5' 2\").    Weight as of this encounter: 68 kg (150 lb).           Return in about 6 months (around 9/11/2022) for Routine preventive.    Campbell Honeycutt MD, MD  [facility] DEREJE Hallman is a 57 year old who presents for the following health issues     History of Present Illness       Reason for visit:  Medication revie    She eats 2-3 servings of fruits and vegetables daily.She consumes 1 sweetened beverage(s) daily.She exercises with enough effort to increase her heart rate 9 or less minutes per day.  She exercises with enough effort to increase her heart rate 3 or less days per week.   She is taking medications regularly.       She takes gabapentin 1-2 times per week for low back pain.    She has dysuria every other day.  Had negative UC in January.    Current Outpatient Medications   Medication Sig Dispense Refill     acetaminophen (TYLENOL) 500 MG tablet [ACETAMINOPHEN (TYLENOL) 500 MG TABLET] Take 1 tablet (500 mg total) by mouth every 6 (six) hours as needed for pain. 100 tablet 11     dextromethorphan-guaiFENesin (ROBITUSSIN-DM)  mg/5 mL liquid [DEXTROMETHORPHAN-GUAIFENESIN " (ROBITUSSIN-DM)  MG/5 ML LIQUID] 10 ml 4 times daily as needed for cough 240 mL 3     gabapentin (NEURONTIN) 100 MG capsule [GABAPENTIN (NEURONTIN) 100 MG CAPSULE] Take 1 capsule at bedtime 30 capsule 11     hydrocortisone-aloe 1 % CREA [HYDROCORTISONE WITH ALOE 1 % CREA CREAM] Apply thin layer to affected area twice a day 56 g 3     ibuprofen (ADVIL,MOTRIN) 400 MG tablet [IBUPROFEN (ADVIL,MOTRIN) 400 MG TABLET] Take 1 tablet (400 mg total) by mouth daily as needed for pain. 30 tablet 11     triamcinolone (KENALOG) 0.1 % external cream [TRIAMCINOLONE (KENALOG) 0.1 % CREAM] Apply 1 gm to arms 2 times daily 80 g 3         Review of Systems   No fever or cough.      Objective    /74 (Cuff Size: Adult Regular)   Pulse 73   Temp 98.3  F (36.8  C)   Wt 68 kg (150 lb)   LMP 05/01/2021 (Within Weeks)   SpO2 95%   BMI 27.44 kg/m    Body mass index is 27.44 kg/m .  Physical Exam   Heart normal  Lungs normal  Back: no CVA tenderness.  No midline spinal tenderness.  Straight leg raise: pain from back to knee, bilateral    UA: moderate squamous epithelial cells (despite midstream collection instructions before sample)

## 2022-07-14 ENCOUNTER — OFFICE VISIT (OUTPATIENT)
Dept: FAMILY MEDICINE | Facility: CLINIC | Age: 57
End: 2022-07-14
Payer: COMMERCIAL

## 2022-07-14 VITALS
OXYGEN SATURATION: 97 % | BODY MASS INDEX: 26.52 KG/M2 | HEART RATE: 62 BPM | WEIGHT: 145 LBS | SYSTOLIC BLOOD PRESSURE: 111 MMHG | DIASTOLIC BLOOD PRESSURE: 72 MMHG | TEMPERATURE: 98.1 F | RESPIRATION RATE: 20 BRPM

## 2022-07-14 DIAGNOSIS — M54.2 NECK PAIN: Primary | ICD-10-CM

## 2022-07-14 DIAGNOSIS — G44.209 TENSION HEADACHE: ICD-10-CM

## 2022-07-14 DIAGNOSIS — R42 VERTIGO: ICD-10-CM

## 2022-07-14 PROCEDURE — 99214 OFFICE O/P EST MOD 30 MIN: CPT | Performed by: NURSE PRACTITIONER

## 2022-07-14 RX ORDER — BENZOCAINE/MENTHOL 6 MG-10 MG
LOZENGE MUCOUS MEMBRANE
COMMUNITY
Start: 2022-01-29 | End: 2024-01-05

## 2022-07-14 RX ORDER — MECLIZINE HYDROCHLORIDE 25 MG/1
25 TABLET ORAL 3 TIMES DAILY PRN
Qty: 20 TABLET | Refills: 0 | Status: SHIPPED | OUTPATIENT
Start: 2022-07-14 | End: 2023-09-26

## 2022-07-14 RX ORDER — NAPROXEN 500 MG/1
500 TABLET ORAL 2 TIMES DAILY PRN
Qty: 28 TABLET | Refills: 0 | Status: SHIPPED | OUTPATIENT
Start: 2022-07-14 | End: 2022-07-28

## 2022-07-14 RX ORDER — ACETAMINOPHEN 500 MG
1000 TABLET ORAL EVERY 8 HOURS PRN
Qty: 1000 TABLET | Refills: 0 | Status: SHIPPED | OUTPATIENT
Start: 2022-07-14 | End: 2024-01-05

## 2022-07-14 ASSESSMENT — ENCOUNTER SYMPTOMS
VOMITING: 0
NUMBNESS: 0
WEAKNESS: 0
NAUSEA: 0
COUGH: 0
DIZZINESS: 1
BACK PAIN: 1

## 2022-07-14 NOTE — PATIENT INSTRUCTIONS
Meclizine - can take for dizziness or nausea.  Can cause her to be sleepy.      Naproxen as needed for head/neck pain.      See primary care next week if not better.      Warm packs to neck.

## 2022-07-14 NOTE — PROGRESS NOTES
"Assessment & Plan     Vertigo    - meclizine (ANTIVERT) 25 MG tablet  Dispense: 20 tablet; Refill: 0    Neck pain    - naproxen (NAPROSYN) 500 MG tablet  Dispense: 28 tablet; Refill: 0  - acetaminophen (TYLENOL) 500 MG tablet  Dispense: 1000 tablet; Refill: 0    Tension headache       Patient with left-sided neck pain, intermittent headaches, and tenderness with described vertiginous symptoms with change in positions which resolve with lying still.  All of her symptoms are chronic to some degree and come and go in intensity.  Does have a history of meningiomas which were evaluated in 2020.    Has not tried anything for symptoms at home.  Can try twice daily naproxen, Tylenol as needed, warm packs to neck.      Son is here and also speaks English.  With  and with English-speaking son, recommended meclizine with persistent dizziness that does not resolve after short period of time or which occurs with most position changes.  Should be rechecked with PCP if this is not resolved in a few days.        25 minutes spent on the date of the encounter doing chart review, patient visit, documentation and discussion with family         Return in about 1 week (around 7/21/2022) for If no better.    Milka Ku, [facility] PACHECO Hallman is a 57 year old female who presents to clinic today for the following health issues:  Chief Complaint   Patient presents with     Headache     Headache x about 1 week. Strain at back of neck possibly from \"wrong sleep position\". Pt states feel nausea when lay down, no fever     HPI    Neck pain and dizzy.  Headaches come and go.  Neck pain 7 out of 10.    When she lies down, feels like room is moving. Comes and goes. Will stop after about 3-4 minutes. Has been going on a for a week.  Has had similar in the past, but this is worse than usual.  Does not have anything at home for treatment of this.  No fevers, weakness, illness symptoms.    Hx " left meningioma - had stable meningioma MRI in 2020.  Was told no need for repeat imaging.      Hx intermittent headaches for years per chart review.      Took 1 tylenol this morning.  Otherwise nothing for pain.      Doesn't work.      Due to language barrier, an  was present during the history-taking and subsequent discussion (and for part of the physical exam) with this patient.        Review of Systems   HENT: Negative for congestion.    Respiratory: Negative for cough.    Gastrointestinal: Negative for nausea and vomiting.   Musculoskeletal: Positive for back pain.   Neurological: Positive for dizziness. Negative for weakness and numbness.           Objective    /72 (BP Location: Right arm, Patient Position: Sitting, Cuff Size: Adult Regular)   Pulse 62   Temp 98.1  F (36.7  C) (Oral)   Resp 20   Wt 65.8 kg (145 lb)   LMP 05/01/2021 (Within Weeks)   SpO2 97%   BMI 26.52 kg/m    Physical Exam  Constitutional:       General: She is not in acute distress.     Appearance: She is well-developed.   HENT:      Left Ear: Tympanic membrane normal.   Eyes:      General:         Right eye: No discharge.         Left eye: No discharge.      Conjunctiva/sclera: Conjunctivae normal.   Pulmonary:      Effort: Pulmonary effort is normal.   Musculoskeletal:         General: Normal range of motion.      Cervical back: Tenderness (left neck ) present. No rigidity.   Skin:     General: Skin is warm and dry.      Capillary Refill: Capillary refill takes less than 2 seconds.   Neurological:      Mental Status: She is alert and oriented to person, place, and time.      Motor: No weakness.      Coordination: Coordination normal.      Gait: Gait normal.   Psychiatric:         Mood and Affect: Mood normal.         Behavior: Behavior normal.         Thought Content: Thought content normal.         Judgment: Judgment normal.

## 2022-07-22 ENCOUNTER — OFFICE VISIT (OUTPATIENT)
Dept: FAMILY MEDICINE | Facility: CLINIC | Age: 57
End: 2022-07-22
Payer: COMMERCIAL

## 2022-07-22 VITALS
DIASTOLIC BLOOD PRESSURE: 74 MMHG | SYSTOLIC BLOOD PRESSURE: 119 MMHG | TEMPERATURE: 97.8 F | WEIGHT: 147 LBS | HEART RATE: 61 BPM | RESPIRATION RATE: 18 BRPM | BODY MASS INDEX: 26.89 KG/M2 | OXYGEN SATURATION: 100 %

## 2022-07-22 DIAGNOSIS — R30.0 PAINFUL URGING TO URINATE: Primary | ICD-10-CM

## 2022-07-22 DIAGNOSIS — N30.00 ACUTE CYSTITIS WITHOUT HEMATURIA: ICD-10-CM

## 2022-07-22 LAB
ALBUMIN UR-MCNC: NEGATIVE MG/DL
APPEARANCE UR: ABNORMAL
BACTERIA #/AREA URNS HPF: ABNORMAL /HPF
BILIRUB UR QL STRIP: NEGATIVE
COLOR UR AUTO: YELLOW
GLUCOSE UR STRIP-MCNC: NEGATIVE MG/DL
HGB UR QL STRIP: NEGATIVE
KETONES UR STRIP-MCNC: NEGATIVE MG/DL
LEUKOCYTE ESTERASE UR QL STRIP: ABNORMAL
NITRATE UR QL: NEGATIVE
PH UR STRIP: 5.5 [PH] (ref 5–8)
RBC #/AREA URNS AUTO: ABNORMAL /HPF
SP GR UR STRIP: 1.01 (ref 1–1.03)
SQUAMOUS #/AREA URNS AUTO: ABNORMAL /LPF
UROBILINOGEN UR STRIP-ACNC: 0.2 E.U./DL
WBC #/AREA URNS AUTO: ABNORMAL /HPF

## 2022-07-22 PROCEDURE — 81001 URINALYSIS AUTO W/SCOPE: CPT | Performed by: EMERGENCY MEDICINE

## 2022-07-22 PROCEDURE — 99213 OFFICE O/P EST LOW 20 MIN: CPT | Performed by: EMERGENCY MEDICINE

## 2022-07-22 PROCEDURE — 87086 URINE CULTURE/COLONY COUNT: CPT | Performed by: EMERGENCY MEDICINE

## 2022-07-22 RX ORDER — NITROFURANTOIN 25; 75 MG/1; MG/1
100 CAPSULE ORAL 2 TIMES DAILY
Qty: 10 CAPSULE | Refills: 0 | Status: SHIPPED | OUTPATIENT
Start: 2022-07-22 | End: 2022-07-22

## 2022-07-22 RX ORDER — NITROFURANTOIN 25; 75 MG/1; MG/1
100 CAPSULE ORAL 2 TIMES DAILY
Qty: 10 CAPSULE | Refills: 0 | Status: SHIPPED | OUTPATIENT
Start: 2022-07-22 | End: 2022-07-27

## 2022-07-22 NOTE — PROGRESS NOTES
Impression:  Acute cystitis    Plan:  Macrobid for 5 days, fluids, follow-up if not better      Chief Complaint:  Patient presents with:  Urinary Problem: Pain when urinate x 1 day          HPI:   Law Cruz is a 57 year old female who presents to this clinic for the evaluation of dysuria.  Patient complains of a 1 day history of dysuria and urinary frequency.  No fever chills or nausea or vomiting.  No hematuria.  Mild back pain and suprapubic pain.      PMH:   Past Medical History:   Diagnosis Date     Chest pain      Colitis      Depression      Irregular heart rate      Perforated appendix 03/11/2014    In [place]     SOB (shortness of breath)      No past surgical history on file.      ROS:  All other systems negative    Meds:    Current Outpatient Medications:      acetaminophen (TYLENOL) 500 MG tablet, Take 2 tablets (1,000 mg) by mouth every 8 hours as needed for mild pain, Disp: 1000 tablet, Rfl: 0     acetaminophen (TYLENOL) 500 MG tablet, [ACETAMINOPHEN (TYLENOL) 500 MG TABLET] Take 1 tablet (500 mg total) by mouth every 6 (six) hours as needed for pain., Disp: 100 tablet, Rfl: 11     gabapentin (NEURONTIN) 100 MG capsule, [GABAPENTIN (NEURONTIN) 100 MG CAPSULE] Take 1 capsule at bedtime, Disp: 30 capsule, Rfl: 11     hydrocortisone (CORTAID) 1 % external cream, APPLY THIN LAYER TO BOTH ARMS TWICE A DAY, Disp: , Rfl:      hydrocortisone-aloe 1 % CREA, [HYDROCORTISONE WITH ALOE 1 % CREA CREAM] Apply thin layer to affected area twice a day, Disp: 56 g, Rfl: 3     ibuprofen (ADVIL,MOTRIN) 400 MG tablet, [IBUPROFEN (ADVIL,MOTRIN) 400 MG TABLET] Take 1 tablet (400 mg total) by mouth daily as needed for pain., Disp: 30 tablet, Rfl: 11     meclizine (ANTIVERT) 25 MG tablet, Take 1 tablet (25 mg) by mouth 3 times daily as needed for dizziness or nausea, Disp: 20 tablet, Rfl: 0     naproxen (NAPROSYN) 500 MG tablet, Take 1 tablet (500 mg) by mouth 2 times daily as needed for moderate pain or headaches, Disp: 28 tablet,  Rfl: 0     triamcinolone (KENALOG) 0.1 % external cream, [TRIAMCINOLONE (KENALOG) 0.1 % CREAM] Apply 1 gm to arms 2 times daily, Disp: 80 g, Rfl: 3        Social:  Social History     Socioeconomic History     Marital status:      Spouse name: Not on file     Number of children: Not on file     Years of education: Not on file     Highest education level: Not on file   Occupational History     Not on file   Tobacco Use     Smoking status: Never Smoker     Smokeless tobacco: Current User     Types: Chew     Tobacco comment: [employer] w/ tobacco   Substance and Sexual Activity     Alcohol use: No     Drug use: No     Sexual activity: Yes     Partners: Male   Other Topics Concern     Not on file   Social History Narrative     Not on file     Social Determinants of Health     Financial Resource Strain: Not on file   Food Insecurity: Not on file   Transportation Needs: Not on file   Physical Activity: Not on file   Stress: Not on file   Social Connections: Not on file   Intimate Partner Violence: Not on file   Housing Stability: Not on file         Physical Exam:  Vitals:    07/22/22 1433   BP: 119/74   BP Location: Right arm   Patient Position: Sitting   Cuff Size: Adult Large   Pulse: 61   Resp: 18   Temp: 97.8  F (36.6  C)   TempSrc: Tympanic   SpO2: 100%   Weight: 66.7 kg (147 lb)      Patient is awake, alert, no distress  Abdomen: Mild suprapubic tenderness.  There is no CVA tenderness  Neuro: Normal motor and sensory function in all extremities  Psych: Awake, alert, normally responsive      Results:    Results for orders placed or performed in visit on 07/22/22 (from the past 24 hour(s))   UA macro with reflex to Microscopic and Culture - Clinc Collect    Specimen: Urine, Clean Catch   Result Value Ref Range    Color Urine Yellow Colorless, Straw, Light Yellow, Yellow    Appearance Urine Slightly Cloudy (A) Clear    Glucose Urine Negative Negative mg/dL    Bilirubin Urine Negative Negative    Ketones Urine  Negative Negative mg/dL    Specific Gravity Urine 1.010 1.005 - 1.030    Blood Urine Negative Negative    pH Urine 5.5 5.0 - 8.0    Protein Albumin Urine Negative Negative mg/dL    Urobilinogen Urine 0.2 0.2, 1.0 E.U./dL    Nitrite Urine Negative Negative    Leukocyte Esterase Urine Small (A) Negative   Urine Microscopic   Result Value Ref Range    Bacteria Urine Few (A) None Seen /HPF    RBC Urine 0-2 0-2 /HPF /HPF    WBC Urine 10-25 (A) 0-5 /HPF /HPF    Squamous Epithelials Urine Few (A) None Seen /LPF              Michael Bragg MD

## 2022-07-24 LAB — BACTERIA UR CULT: NORMAL

## 2022-09-30 ENCOUNTER — OFFICE VISIT (OUTPATIENT)
Dept: FAMILY MEDICINE | Facility: CLINIC | Age: 57
End: 2022-09-30

## 2022-10-03 ENCOUNTER — OFFICE VISIT (OUTPATIENT)
Dept: FAMILY MEDICINE | Facility: CLINIC | Age: 57
End: 2022-10-03
Payer: COMMERCIAL

## 2022-10-03 VITALS
BODY MASS INDEX: 26.68 KG/M2 | HEIGHT: 62 IN | WEIGHT: 145 LBS | SYSTOLIC BLOOD PRESSURE: 129 MMHG | DIASTOLIC BLOOD PRESSURE: 81 MMHG | RESPIRATION RATE: 16 BRPM | HEART RATE: 69 BPM | TEMPERATURE: 98.1 F

## 2022-10-03 DIAGNOSIS — Z11.3 SCREEN FOR STD (SEXUALLY TRANSMITTED DISEASE): ICD-10-CM

## 2022-10-03 DIAGNOSIS — R10.31 RLQ ABDOMINAL PAIN: Primary | ICD-10-CM

## 2022-10-03 DIAGNOSIS — J98.4 CALCIFIED GRANULOMA OF LUNG: ICD-10-CM

## 2022-10-03 DIAGNOSIS — D32.9 MENINGIOMA (H): ICD-10-CM

## 2022-10-03 LAB
ALBUMIN UR-MCNC: NEGATIVE MG/DL
APPEARANCE UR: CLEAR
BACTERIA #/AREA URNS HPF: ABNORMAL /HPF
BASOPHILS # BLD AUTO: 0.1 10E3/UL (ref 0–0.2)
BASOPHILS NFR BLD AUTO: 1 %
BILIRUB UR QL STRIP: NEGATIVE
COLOR UR AUTO: YELLOW
EOSINOPHIL # BLD AUTO: 0.1 10E3/UL (ref 0–0.7)
EOSINOPHIL NFR BLD AUTO: 1 %
ERYTHROCYTE [DISTWIDTH] IN BLOOD BY AUTOMATED COUNT: 12.8 % (ref 10–15)
FSH SERPL IRP2-ACNC: 76.1 MIU/ML
GLUCOSE UR STRIP-MCNC: NEGATIVE MG/DL
HCT VFR BLD AUTO: 41.7 % (ref 35–47)
HGB BLD-MCNC: 13.4 G/DL (ref 11.7–15.7)
HGB UR QL STRIP: NEGATIVE
IMM GRANULOCYTES # BLD: 0 10E3/UL
IMM GRANULOCYTES NFR BLD: 0 %
KETONES UR STRIP-MCNC: NEGATIVE MG/DL
LEUKOCYTE ESTERASE UR QL STRIP: NEGATIVE
LYMPHOCYTES # BLD AUTO: 3 10E3/UL (ref 0.8–5.3)
LYMPHOCYTES NFR BLD AUTO: 37 %
MCH RBC QN AUTO: 27.5 PG (ref 26.5–33)
MCHC RBC AUTO-ENTMCNC: 32.1 G/DL (ref 31.5–36.5)
MCV RBC AUTO: 86 FL (ref 78–100)
MONOCYTES # BLD AUTO: 0.4 10E3/UL (ref 0–1.3)
MONOCYTES NFR BLD AUTO: 5 %
NEUTROPHILS # BLD AUTO: 4.6 10E3/UL (ref 1.6–8.3)
NEUTROPHILS NFR BLD AUTO: 56 %
NITRATE UR QL: NEGATIVE
PH UR STRIP: 5.5 [PH] (ref 5–8)
PLATELET # BLD AUTO: 290 10E3/UL (ref 150–450)
RBC # BLD AUTO: 4.88 10E6/UL (ref 3.8–5.2)
RBC #/AREA URNS AUTO: ABNORMAL /HPF
SP GR UR STRIP: <=1.005 (ref 1–1.03)
SQUAMOUS #/AREA URNS AUTO: ABNORMAL /LPF
UROBILINOGEN UR STRIP-ACNC: 0.2 E.U./DL
WBC # BLD AUTO: 8.2 10E3/UL (ref 4–11)
WBC #/AREA URNS AUTO: ABNORMAL /HPF

## 2022-10-03 PROCEDURE — 81001 URINALYSIS AUTO W/SCOPE: CPT | Performed by: FAMILY MEDICINE

## 2022-10-03 PROCEDURE — 86706 HEP B SURFACE ANTIBODY: CPT | Performed by: FAMILY MEDICINE

## 2022-10-03 PROCEDURE — 87591 N.GONORRHOEAE DNA AMP PROB: CPT | Performed by: FAMILY MEDICINE

## 2022-10-03 PROCEDURE — 86803 HEPATITIS C AB TEST: CPT | Performed by: FAMILY MEDICINE

## 2022-10-03 PROCEDURE — 87340 HEPATITIS B SURFACE AG IA: CPT | Performed by: FAMILY MEDICINE

## 2022-10-03 PROCEDURE — 87491 CHLMYD TRACH DNA AMP PROBE: CPT | Performed by: FAMILY MEDICINE

## 2022-10-03 PROCEDURE — 85025 COMPLETE CBC W/AUTO DIFF WBC: CPT | Performed by: FAMILY MEDICINE

## 2022-10-03 PROCEDURE — 86780 TREPONEMA PALLIDUM: CPT | Performed by: FAMILY MEDICINE

## 2022-10-03 PROCEDURE — 87389 HIV-1 AG W/HIV-1&-2 AB AG IA: CPT | Performed by: FAMILY MEDICINE

## 2022-10-03 PROCEDURE — 36415 COLL VENOUS BLD VENIPUNCTURE: CPT | Performed by: FAMILY MEDICINE

## 2022-10-03 PROCEDURE — 99214 OFFICE O/P EST MOD 30 MIN: CPT | Performed by: FAMILY MEDICINE

## 2022-10-03 PROCEDURE — 83001 ASSAY OF GONADOTROPIN (FSH): CPT | Performed by: FAMILY MEDICINE

## 2022-10-03 ASSESSMENT — ENCOUNTER SYMPTOMS
DYSURIA: 1
NAUSEA: 0
CONSTIPATION: 0
CHILLS: 0
HEMATURIA: 0
FEVER: 0
DIARRHEA: 0
VOMITING: 0
FLANK PAIN: 1
ABDOMINAL PAIN: 1

## 2022-10-03 NOTE — PROGRESS NOTES
[facility] IN-OFFICE Visit  Phone : Yany     Assessment/Plan:  RLQ abdominal pain  New onset. Ddx includes but not limited to cystitis, pyleonephritis, nephrolithiasis, urethritis, PID, and colitis. Has classic signs of urinary infection, in the absence of constitutional or GI symptoms, and PE without rebound tenderness and + (R) CVA tenderness, therefore high suspicion cystitis is the cause of abdominal pain. Due to recent history of multiple episodes with these symptoms, will consider f/u ultrasound to rule out uterus or ovarian etiology.   Plan:   - UA with Microscopic reflex to Culture - Clinic Collect  - NEISSERIA GONORRHOEA PCR  - CHLAMYDIA TRACHOMATIS PCR  - CBC with platelets and differential  - CBC with platelets and differential  - Urine Microscopic  - Follicle stimulating hormone  - NEISSERIA GONORRHOEA PCR  - CHLAMYDIA TRACHOMATIS PCR  - Follicle stimulating hormone  - Drink plenty of water and take Tylenol as needed for pain  - Discussed red flag symptoms and when to seek care emergently  - Will follow up with results and consider abx as indicated    Calcified granuloma of lung (H)  Benign 6 mm nodule right upper lobe. No further work-up needed at this time.     Meningioma (H)  Three noted 1/30/2020 on MRI stable and consistent with prior CT 6/29/18 all measure < 3 cm in size. UTD recommends active surveillance with yearly MRI's for 3-5 years.  Plan:  - Follow up with PCP as scheduled and consider repeat MRI    Screen for STD (sexually transmitted disease)  Symptoms of dysuria and RLQ pain + sexually active and not using protection. Patient would like testing to identify STI as potential etiology of symptoms.   Plan:  - HIV Antigen Antibody Combo  - Hepatitis C Screen Reflex to HCV RNA Quant and Genotype  - NEISSERIA GONORRHOEA PCR  - CHLAMYDIA TRACHOMATIS PCR  - Treponema Abs w Reflex to RPR and Titer  - Hepatitis B Surface Antibody  - Hepatitis B surface  "antigen  - HIV Antigen Antibody Combo  - Hepatitis C Screen Reflex to HCV RNA Quant and Genotype  - Treponema Abs w Reflex to RPR and Titer  - Hepatitis B Surface Antibody  - Hepatitis B surface antigen  - NEISSERIA GONORRHOEA PCR  - CHLAMYDIA TRACHOMATIS PCR  - Recommend safe sex practices     Return in about 2 weeks (around 10/17/2022) for Health Maintenance Visit, PCP.      Subjective   Law is a 57 year old, presenting for the following health issues:  Frequent bathroom use (Stomach ache often and this has been occurring for the last two weeks. Might have a urine infection as well. )      HPI   Acute localized abdominal pain x2 weeks. Pain at right side of lower abdomen, is constant and describes as burning sensation, \"burning and fire that radiates from my stomach to my feet and back.\" Hurts worse with urination. Has tried tylenol to help with the pain with little improvement. Is going to the bathroom to urinate more frequently over the last two weeks. No constitutional symptoms, blood in the urine or vaginal discharge. Has had similar symptoms 3 times this year and has been treated with antibiotics which improved her symptoms.    Is currently sexually active with one partner. Does not use condoms or barrier protection. Has never had an STI before. Not sure about her partner having other partners.      LMP last year - thinks her age is correct but not certain. Her oldest child is 31/32 years old.  She thinks she was about 21 years old when she had her first baby.      Declines vaccines       Review of Systems   Constitutional: Negative for chills and fever.   Gastrointestinal: Positive for abdominal pain. Negative for constipation, diarrhea, nausea and vomiting.   Genitourinary: Positive for dysuria and flank pain. Negative for hematuria and vaginal discharge.          Objective    /81 (BP Location: Right arm, Patient Position: Sitting, Cuff Size: Adult Regular)   Pulse 69   Temp 98.1  F (36.7  C) " "(Temporal)   Resp 16   Ht 1.575 m (5' 2.01\")   Wt 65.8 kg (145 lb)   LMP 05/01/2021 (Within Weeks)   BMI 26.51 kg/m    Body mass index is 26.51 kg/m .     Physical Exam     Wt Readings from Last 3 Encounters:   10/03/22 65.8 kg (145 lb)   07/22/22 66.7 kg (147 lb)   07/14/22 65.8 kg (145 lb)       Patient's last menstrual period was 05/01/2021 (within weeks).    All normal as below except abnormalities include: Tenderness elicited in RLQ with deep palpation, no rebound. Right CVA tenderness.  General is a  57 year old sitting comfortably in no apparent distress wearing a mask   CV: Regular rate and rhythm S1S2 without rubs, murmurs or gallops, no peripheral edema  Lungs: Clear to auscultation bilaterally  Abd:  +BS, soft ND,  No masses or organomegally.   Skin: No lesions or rashes noted  Pelvic:deferred     History summarized from1-2:many visits for possible UTI in past several years- normal urine cultures without clear cause for her symptoms.   Old Records-1: Outside allergies, meds, problems and immunizations were reconciled as needed from CareEverywhere  Radiology tests reviewed-1: 2020 last MRI- 3 stable lesions c/w menigniomas done to workup headaches.  No further eval needed unless change in symptoms.    Ct 2019- benign 6mm calcified granulama.     Pelvic us 2015 done for pain- normal.  Normal adenexa.    Lab tests reviewed-1: ua from [id number] reviewed  Neg urine cultures   ua- no RBC on micro    MICHAEL WhitingN, RN, FNP student    Physician Attestation   I, Beverly Galvan, saw this patient and have discussed and personally reviewed information outlined in this document.    I agree with the findings and plan of care as documented in the note.      MD Beverly Mclean MD                  "

## 2022-10-04 LAB
C TRACH DNA SPEC QL NAA+PROBE: NEGATIVE
HBV SURFACE AB SERPL IA-ACNC: >1000 M[IU]/ML
HBV SURFACE AB SERPL IA-ACNC: REACTIVE M[IU]/ML
HBV SURFACE AG SERPL QL IA: NONREACTIVE
HCV AB SERPL QL IA: NONREACTIVE
HIV 1+2 AB+HIV1 P24 AG SERPL QL IA: NONREACTIVE
N GONORRHOEA DNA SPEC QL NAA+PROBE: NEGATIVE
T PALLIDUM AB SER QL: NONREACTIVE

## 2022-10-05 NOTE — RESULT ENCOUNTER NOTE
Team - please call patient with results and send result letter with this information if patient desires for their records.     Please let pt know that her tests were all normal  No Sexual infections  No urine infections   She has gone through menopause  She is IMMUNE to hepatitis b     I would recommend she get a pelvic us next - order placed.  Please help her get this scheduled before she sees her pcp 10/18/22 if possible

## 2022-10-06 ENCOUNTER — TELEPHONE (OUTPATIENT)
Dept: FAMILY MEDICINE | Facility: CLINIC | Age: 57
End: 2022-10-06

## 2022-10-06 NOTE — TELEPHONE ENCOUNTER
Attempted call to patient with , waited on hold for 10 minutes for julee  will try back later     ----- Message from Beverly Galvan MD sent at 10/5/2022 11:01 AM CDT -----  Team - please call patient with results and send result letter with this information if patient desires for their records.     Please let pt know that her tests were all normal  No Sexual infections  No urine infections   She has gone through menopause  She is IMMUNE to hepatitis b     I would recommend she get a pelvic us next - order placed.  Please help her get this scheduled before she sees her pcp 10/18/22 if possible

## 2022-10-17 NOTE — TELEPHONE ENCOUNTER
"Called language line they did not have a [facility]  available will try again later  \" Okay to relay message\"    "

## 2022-10-18 ENCOUNTER — HOSPITAL ENCOUNTER (OUTPATIENT)
Dept: ULTRASOUND IMAGING | Facility: HOSPITAL | Age: 57
Discharge: HOME | End: 2022-10-18
Attending: FAMILY MEDICINE | Admitting: FAMILY MEDICINE
Payer: COMMERCIAL

## 2022-10-18 ENCOUNTER — OFFICE VISIT (OUTPATIENT)
Dept: FAMILY MEDICINE | Facility: CLINIC | Age: 57
End: 2022-10-18
Payer: COMMERCIAL

## 2022-10-18 VITALS
SYSTOLIC BLOOD PRESSURE: 118 MMHG | BODY MASS INDEX: 26.61 KG/M2 | TEMPERATURE: 98.3 F | DIASTOLIC BLOOD PRESSURE: 72 MMHG | HEART RATE: 74 BPM | WEIGHT: 145.5 LBS | OXYGEN SATURATION: 95 %

## 2022-10-18 DIAGNOSIS — R10.31 RLQ ABDOMINAL PAIN: ICD-10-CM

## 2022-10-18 DIAGNOSIS — Z59.9 FINANCIAL DIFFICULTY: ICD-10-CM

## 2022-10-18 DIAGNOSIS — G89.29 CHRONIC RIGHT-SIDED LOW BACK PAIN WITH RIGHT-SIDED SCIATICA: Primary | ICD-10-CM

## 2022-10-18 DIAGNOSIS — M54.41 CHRONIC RIGHT-SIDED LOW BACK PAIN WITH RIGHT-SIDED SCIATICA: Primary | ICD-10-CM

## 2022-10-18 PROCEDURE — 99214 OFFICE O/P EST MOD 30 MIN: CPT | Performed by: FAMILY MEDICINE

## 2022-10-18 PROCEDURE — 76830 TRANSVAGINAL US NON-OB: CPT

## 2022-10-18 SDOH — ECONOMIC STABILITY - INCOME SECURITY: PROBLEM RELATED TO HOUSING AND ECONOMIC CIRCUMSTANCES, UNSPECIFIED: Z59.9

## 2022-10-18 NOTE — PROGRESS NOTES
"  Assessment & Plan     Chronic right-sided low back pain with right-sided sciatica      Financial difficulty    - Primary Care - Care Coordination Referral        30 minutes spent on the date of the encounter doing chart review and patient visit regarding back pain, financial difficulty.             BMI:   Estimated body mass index is 26.61 kg/m  as calculated from the following:    Height as of 10/3/22: 1.575 m (5' 2.01\").    Weight as of this encounter: 66 kg (145 lb 8 oz).           Return in about 4 weeks (around 11/15/2022) for Back pain.    Cmapbell Honeycutt MD  [facility] DEREJE Hallman is a 57 year old, presenting for the following health issues:  Generalized Body Aches    HPI:       Hard to stand long.  Pain from back to legs, R>L.  Had MRI 2016, bilateral impingement of L5 nerve roots.    Takes gabapentin sometimes.    Current Outpatient Medications   Medication Sig Dispense Refill     acetaminophen (TYLENOL) 500 MG tablet Take 2 tablets (1,000 mg) by mouth every 8 hours as needed for mild pain 1000 tablet 0     acetaminophen (TYLENOL) 500 MG tablet [ACETAMINOPHEN (TYLENOL) 500 MG TABLET] Take 1 tablet (500 mg total) by mouth every 6 (six) hours as needed for pain. 100 tablet 11     gabapentin (NEURONTIN) 100 MG capsule [GABAPENTIN (NEURONTIN) 100 MG CAPSULE] Take 1 capsule at bedtime 30 capsule 11     hydrocortisone (CORTAID) 1 % external cream APPLY THIN LAYER TO BOTH ARMS TWICE A DAY       hydrocortisone-aloe 1 % CREA [HYDROCORTISONE WITH ALOE 1 % CREA CREAM] Apply thin layer to affected area twice a day 56 g 3     ibuprofen (ADVIL,MOTRIN) 400 MG tablet [IBUPROFEN (ADVIL,MOTRIN) 400 MG TABLET] Take 1 tablet (400 mg total) by mouth daily as needed for pain. 30 tablet 11     meclizine (ANTIVERT) 25 MG tablet Take 1 tablet (25 mg) by mouth 3 times daily as needed for dizziness or nausea 20 tablet 0     triamcinolone (KENALOG) 0.1 % external cream [TRIAMCINOLONE (KENALOG) 0.1 % " CREAM] Apply 1 gm to arms 2 times daily 80 g 3          from  May 1.  Financial stress.  3 children at home.  Adult children away from home.    Review of Systems   No fever or cough.      Objective    /72 (Cuff Size: Adult Regular)   Pulse 74   Temp 98.3  F (36.8  C) (Oral)   Wt 66 kg (145 lb 8 oz)   LMP 05/01/2021 (Within Weeks)   SpO2 95%   BMI 26.61 kg/m    Body mass index is 26.61 kg/m .  Physical Exam   Heart normal  Lungs normal  Straight leg raise positive bilateral to calf, worse pain on right

## 2022-10-19 ENCOUNTER — TELEPHONE (OUTPATIENT)
Dept: FAMILY MEDICINE | Facility: CLINIC | Age: 57
End: 2022-10-19

## 2022-10-19 NOTE — TELEPHONE ENCOUNTER
----- Message from Beverly Galvan MD sent at 10/19/2022  2:40 PM CDT -----  Team - please call patient with results and send result letter with this information if patient desires for their records.     Her pelvic us is normal- no concerns about her uterus or ovaries.      She should consider seeing a bladder specialist next due to her urine problems she keeps getting and pelvic pain without any obvious cause.  Let me know if she would like a referral

## 2022-10-19 NOTE — LETTER
October 28, 2022      [facility]  [address] [place] [address]        Dear ,    We are writing to inform you of your test results.     pelvic us is normal- no concerns about her uterus or ovaries.       You should consider seeing a bladder specialist next due to your urine problems id you keeps getting and pelvic pain without any obvious cause.  Let me know if you would like a referral     Resulted Orders   US Pelvis Cmplt w Transvag & Doppler LmtPel Duplex Limited    Narrative    EXAM: US PELVIS COMPLETE W TRANSVAGINAL AND DOPPLER LIMITED  LOCATION: [facility]  DATE/TIME: 10/18/2022 9:45 AM    INDICATION:  RLQ abdominal pain  COMPARISON: None.  TECHNIQUE: Transabdominal scans were performed. Endovaginal ultrasound was performed to better visualize the adnexa. Color flow with spectral Doppler and waveform analysis performed.    FINDINGS:    UTERUS: 7.3 x 5.6 x 3.7 cm. Normal in size and position with no masses.    ENDOMETRIUM: 2 mm. Normal smooth endometrium.    RIGHT OVARY: 2.5 x 2.4 x 1.2 cm. Normal with arterial and venous duplex flow identified.    LEFT OVARY: Not visualized due to peristalsing bowel.    Small amount of free fluid.      Impression    IMPRESSION:    1.  Normal pelvic ultrasound.             If you have any questions or concerns, please call the clinic at the number listed above.       Sincerely,

## 2022-10-19 NOTE — RESULT ENCOUNTER NOTE
Team - please call patient with results and send result letter with this information if patient desires for their records.     Her pelvic us is normal- no concerns about her uterus or ovaries.      She should consider seeing a bladder specialist next due to her urine problems she keeps getting and pelvic pain without any obvious cause.  Let me know if she would like a referral

## 2022-10-25 NOTE — TELEPHONE ENCOUNTER
Left message x 2. Please review message thread below and advise the patient as indicated. Please schedule if necessary or indicated in message thread. Use  line to contact patient : ID:[id number]

## 2022-10-31 ENCOUNTER — PATIENT OUTREACH (OUTPATIENT)
Dept: CARE COORDINATION | Facility: CLINIC | Age: 57
End: 2022-10-31

## 2022-10-31 NOTE — PROGRESS NOTES
Clinic Care Coordination Contact  Community Health Worker Initial Outreach    CHW Initial Information Gathering:  Referral Source: PCP  Preferred Hospital: [facility], [place]  [phone]  Preferred Urgent Care: [facility], [phone]  Current living arrangement:: I live in a private home with family  Type of residence:: Private home - stairs  Community Resources: None  Supplies Currently Used at Home: None  Equipment Currently Used at Home: none  Informal Support system:: Family  No PCP office visit in Past Year: No  Transportation means:: Family, Friend, Medical transport  CHW Additional Questions  If ED/Hospital discharge, follow-up appointment scheduled as recommended?: N/A  Medication changes made following ED/Hospital discharge?: N/A  MyChart active?: No  Patient agreeable to assistance with activating MyChart?: No    Patient accepts CC: Yes. Patient scheduled for assessment with CCC SANCHEZ on 11/02/2022 at 2:00 PM. Patient noted desire to discuss financial difficulties; wants to apply for [place] and [place] benefits and [place]. .     Financial Resource Worker Screening    [facility] Benefits  Is patient requesting help applying for [facility] benefits?: Yes  Have you recently applied for any [facility] benefits?: No  How many people in your household?: 4  Do you buy/eat food together?: Yes  What is the monthly gross income for the household (wages, social security, workers comp, and pension)? : 1200    [place]:  [place] No  Is this an insurance renewal?: No  Is this a new insurance application request?: No    Any other information for the FRW?: Patient would like to apply for [place] benefits; [place] and [place].    Care Coordination team will tell patient:   Thank you for answering all the questions, based on screening questions, our Financial Resource Worker will reach out to you with additional questions and next steps.

## 2022-11-01 ENCOUNTER — PATIENT OUTREACH (OUTPATIENT)
Dept: CARE COORDINATION | Facility: CLINIC | Age: 57
End: 2022-11-01

## 2022-11-01 NOTE — PROGRESS NOTES
Clinic Care Coordination Contact  Program: [facility]   County: [place] [facility] Case #:  [facility] Worker:   Anne #:   Subscriber #:   Renewal:  Date Applied:     FRW Outreach:   11/1/2022:  FRW called patient and left a vm with call back information. FRW will make outreach next week      Health Insurance:      Referral/Screening:  FRW Screening    Row Name 10/31/22 105       County Benefits   Is patient requesting help applying for [facility] benefits? Yes       Have you recently applied for any [facility] benefits? No       How many people in your household? 4       Do you buy/eat food together? Yes       What is the monthly gross income for the household (wages, social security, workers comp, and pension)?  1200       [place]:   [place]? No       Is this an insurance renewal? No       Is this a new insurance application request? No       OTHER   Is this a selena care application? No       Any other information for the FRW? Patient would like to apply for [facility] benefits; [place] and [place].

## 2022-11-02 ENCOUNTER — PATIENT OUTREACH (OUTPATIENT)
Dept: NURSING | Facility: CLINIC | Age: 57
End: 2022-11-02

## 2022-11-02 SDOH — ECONOMIC STABILITY: INCOME INSECURITY: IN THE LAST 12 MONTHS, WAS THERE A TIME WHEN YOU WERE NOT ABLE TO PAY THE MORTGAGE OR RENT ON TIME?: NO

## 2022-11-02 SDOH — ECONOMIC STABILITY: TRANSPORTATION INSECURITY
IN THE PAST 12 MONTHS, HAS THE LACK OF TRANSPORTATION KEPT YOU FROM MEDICAL APPOINTMENTS OR FROM GETTING MEDICATIONS?: NO

## 2022-11-02 SDOH — ECONOMIC STABILITY: TRANSPORTATION INSECURITY
IN THE PAST 12 MONTHS, HAS LACK OF TRANSPORTATION KEPT YOU FROM MEETINGS, WORK, OR FROM GETTING THINGS NEEDED FOR DAILY LIVING?: NO

## 2022-11-02 ASSESSMENT — SOCIAL DETERMINANTS OF HEALTH (SDOH)
HOW HARD IS IT FOR YOU TO PAY FOR THE VERY BASICS LIKE FOOD, HOUSING, MEDICAL CARE, AND HEATING?: NOT VERY HARD
IN A TYPICAL WEEK, HOW MANY TIMES DO YOU TALK ON THE PHONE WITH FAMILY, FRIENDS, OR NEIGHBORS?: THREE TIMES A WEEK
WITHIN THE LAST YEAR, HAVE TO BEEN RAPED OR FORCED TO HAVE ANY KIND OF SEXUAL ACTIVITY BY YOUR PARTNER OR EX-PARTNER?: NO
WITHIN THE LAST YEAR, HAVE YOU BEEN AFRAID OF YOUR PARTNER OR EX-PARTNER?: NO
WITHIN THE LAST YEAR, HAVE YOU BEEN KICKED, HIT, SLAPPED, OR OTHERWISE PHYSICALLY HURT BY YOUR PARTNER OR EX-PARTNER?: NO
WITHIN THE LAST YEAR, HAVE YOU BEEN HUMILIATED OR EMOTIONALLY ABUSED IN OTHER WAYS BY YOUR PARTNER OR EX-PARTNER?: NO
HOW OFTEN DO YOU GET TOGETHER WITH FRIENDS OR RELATIVES?: THREE TIMES A WEEK

## 2022-11-02 ASSESSMENT — LIFESTYLE VARIABLES
HOW MANY STANDARD DRINKS CONTAINING ALCOHOL DO YOU HAVE ON A TYPICAL DAY: PATIENT DOES NOT DRINK
HOW OFTEN DO YOU HAVE SIX OR MORE DRINKS ON ONE OCCASION: NEVER
HOW OFTEN DO YOU HAVE A DRINK CONTAINING ALCOHOL: NEVER
SKIP TO QUESTIONS 9-10: 1
AUDIT-C TOTAL SCORE: 0

## 2022-11-02 NOTE — LETTER
[facility] CARE COORDINATION  [facility]  November 2, 2022    Law Anthony  [address] [place] [address]      Dear Law,        I am a clinic care coordinator who works with Campbell Honeycutt MD, MD with the [facility]. I wanted to thank you for spending the time to talk with me.  Below is a description of clinic care coordination and how I can further assist you.       The clinic care coordination team is made up of a registered nurse, , financial resource worker and community health worker who understand the health care system. The goal of clinic care coordination is to help you manage your health and improve access to the health care system. Our team works alongside your provider to assist you in determining your health and social needs. We can help you obtain health care and community resources, providing you with necessary information and education. We can work with you through any barriers and develop a care plan that helps coordinate and strengthen the communication between you and your care team.    Please feel free to contact me with any questions or concerns regarding care coordination and what we can offer.      We are focused on providing you with the highest-quality healthcare experience possible.    Sincerely,     JONATHAN Rosenthal, [facility]  Social Work Care Coordinator

## 2022-11-02 NOTE — PROGRESS NOTES
Clinic Care Coordination Contact    Clinic Care Coordination Contact  OUTREACH    Referral Information: PCP            Chief Complaint   Patient presents with     Clinic Care Coordination - Initial        Universal Utilization: appropriate  Clinic Utilization  Difficulty keeping appointments:: No  Compliance Concerns: No  No PCP office visit in Past Year: No  Utilization    Hospital Admissions  0             ED Visits  0             No Show Count (past year)  0                Current as of: 11/2/2022  1:08 AM              Clinical Concerns:  Current Medical Concerns:    Patient Active Problem List   Diagnosis     Carpal Tunnel Syndrome     Anemia     Backache     Right-sided low back pain with right-sided sciatica     Memory loss     Meningioma (H)         Current Behavioral Concerns: none  Education Provided to patient: none  Pain  Pain (GOAL):: No  Health Maintenance Reviewed: Due/Overdue   Health Maintenance Due   Topic Date Due     ADVANCE CARE PLANNING  Never done     DTAP/TDAP/TD IMMUNIZATION (1 - Tdap) Never done     ZOSTER IMMUNIZATION (1 of 2) Never done     COVID-19 Vaccine (3 - Booster for Marlys series) 05/06/2022     YEARLY PREVENTIVE VISIT  06/24/2022     INFLUENZA VACCINE (1) 09/01/2022     HEPATITIS B IMMUNIZATION (2 of 3 - 3-dose series) 11/02/2022     Clinical     Medication Management:  Medication review status: Medications reviewed and no changes reported per patient.             Functional Status: independent       Living Situation: private home with family       Lifestyle & Psychosocial Needs:    Social Determinants of Health     Tobacco Use: High Risk     Smoking Tobacco Use: Never     Smokeless Tobacco Use: Current     Passive Exposure: Not on file   Alcohol Use: Not At Risk     Frequency of Alcohol Consumption: Never     Average Number of Drinks: Patient does not drink     Frequency of Binge Drinking: Never   Financial Resource Strain: Low Risk      Difficulty of Paying Living Expenses: Not  very hard   Food Insecurity: Not on file   Transportation Needs: No Transportation Needs     Lack of Transportation (Medical): No     Lack of Transportation (Non-Medical): No   Physical Activity: Not on file   Stress: Not on file   Social Connections: Unknown     Frequency of Communication with Friends and Family: Three times a week     Frequency of Social Gatherings with Friends and Family: Three times a week     Attends Mandaen Services: Not on file     Active Member of Clubs or Organizations: Not on file     Attends Club or Organization Meetings: Not on file     Marital Status: Not on file   Intimate Partner Violence: Not At Risk     Fear of Current or Ex-Partner: No     Emotionally Abused: No     Physically Abused: No     Sexually Abused: No   Depression: Not at risk     PHQ-2 Score: 0   Housing Stability: Low Risk      Unable to Pay for Housing in the Last Year: No     Number of Places Lived in the Last Year: 1     Unstable Housing in the Last Year: No     Diet:: Regular  Inadequate nutrition (GOAL):: No  Tube Feeding: No  Inadequate activity/exercise (GOAL):: No  Significant changes in sleep pattern (GOAL): No  Transportation means:: Family, Friend, Medical transport                   Resources and Interventions:  Current Resources:none           Care Plan:  Care Plan: Financial Wellbeing     Problem: Patient expresses financial resource strain     Goal: Create an action plan to increase financial stability     Start Date: 11/2/2022 Expected End Date: 12/30/2022    This Visit's Progress: 40%    Note:     Barriers: language  Strengths: has strong family support  Patient expressed understanding of goal: yes  Action steps to achieve this goal:  1. I will answer when FRW calls  2. I will answer the phone when disability services calls  3. I will reach out to CC if I need further assistance                        Patient/Caregiver understanding: yes    Outreach Frequency: monthly  Future Appointments               In 3 weeks Campbell Honeycutt MD [facility], DEMETRICE SPRO          Plan: CCSW along with Yany  completed CC assessment with pt. Pt is aware she missed call from FRW yesterday, CCSW reiterated the importance of returning FRW call. Pt understood. Pt would also like to apply for social security disability. Stated she has pain in her abdomen from when she gave birth, and this is limiting her mobility and ability to work. CCSW placed referral to disability specialists. Pt resides in a single family home with her adult children and family. Stated she is independent in all ADLS. Pt does not drive, stated son will transport her if needed. Pt is in most need of food and financial support. Disability specialists referral completed online.      JONATHAN Rosenthal, [facility]  Social Work Care Coordinator

## 2022-11-02 NOTE — LETTER
[facility]  Patient Centered Plan of Care  About Me:        Patient Name:  Law Cruz    YOB: 1965  Age:         57 year old   Jesus MRN:    [id number] Telephone Information:  Home Phone [phone]   Mobile [phone]       Address:  [facility] [address]  [place] [address] Email address:  No e-mail address on record      Emergency Contact(s)    Name Relationship Lgl Grd Work Phone Home Phone Mobile Phone   1. BRUNO,DIEGO Other   [phone]    2. BRUNO,SAY Other   [phone]            Primary language:  Anthony      needed? Yes   [place] [facility]:  [phone] op. 1  Other communication barriers:Language barrier    Preferred Method of Communication:     Current living arrangement: I live in a private home with family    Mobility Status/ Medical Equipment: No data recorded      Health Maintenance  Health Maintenance Reviewed: Due/Overdue   Health Maintenance Due   Topic Date Due     ADVANCE CARE PLANNING  Never done     DTAP/TDAP/TD IMMUNIZATION (1 - Tdap) Never done     ZOSTER IMMUNIZATION (1 of 2) Never done     COVID-19 Vaccine (3 - Booster for Marlys series) 05/06/2022     YEARLY PREVENTIVE VISIT  06/24/2022     INFLUENZA VACCINE (1) 09/01/2022     HEPATITIS B IMMUNIZATION (2 of 3 - 3-dose series) 11/02/2022           My Access Plan  Medical Emergency 911   Primary Clinic Line [facility] [phone]   24 Hour Appointment Line [phone] or  [phone]) (toll-free)   24 Hour Nurse Line [phone] (toll-free)   Preferred Urgent Care [facility] [phone]     [facility]  [phone]     Preferred Pharmacy [facility] - [place], [place] - [address] Behavioral Health Crisis Line The National Suicide Prevention Lifeline at 1-742.237.6179 or Text/Call 108             My Care Team Members  Patient Care Team       Relationship  Specialty Notifications Start End    Campbell Honeycutt MD PCP - General   12/19/11     Phone: [phone] Fax: [phone]         [address] [facility] [address] [place] [address]    Murtaza Ortega MD Assigned PCP   10/29/22     Phone: [phone] Fax: [phone]         [address] [place] [address] [address] [place] [address]    Zach Chavis Community Health Worker Primary Care - CC Admissions 10/31/22     Phone: [phone] Fax: [phone]         [address] [place] [id number]    Milka Flores LG Lead Care Coordinator  Admissions 10/31/22     Christina Cooney MA Financial Resource Worker   10/31/22             My Care Plans  Self Management and Treatment Plan  Care Plan  Care Plan: Financial Wellbeing     Problem: Patient expresses financial resource strain     Goal: Create an action plan to increase financial stability     Start Date: 11/2/2022 Expected End Date: 12/30/2022    This Visit's Progress: 40%    Note:     Barriers: language  Strengths: has strong family support  Patient expressed understanding of goal: yes  Action steps to achieve this goal:  1. I will answer when FRW calls  2. I will answer the phone when disability services calls  3. I will reach out to CC if I need further assistance                         Action Plans on File:                       Advance Care Plans/Directives Type:   No data recorded    My Medical and Care Information  Problem List   Patient Active Problem List   Diagnosis     Carpal Tunnel Syndrome     Anemia     Backache     Right-sided low back pain with right-sided sciatica     Memory loss     Meningioma (H)      Current Medications and Allergies:  See printed Medication Report.    Care Coordination Start Date: 10/29/2022   Frequency of Care Coordination: monthly     Form Last Updated: 11/02/2022

## 2022-11-08 ENCOUNTER — PATIENT OUTREACH (OUTPATIENT)
Dept: CARE COORDINATION | Facility: CLINIC | Age: 57
End: 2022-11-08

## 2022-11-08 NOTE — PROGRESS NOTES
Clinic Care Coordination Contact  Program: [facility]   County: [facility] Case #:  [facility] Worker:   Anne #:   Subscriber #:   Renewal:  Date Applied:     FRW Outreach:   2022: FRW called and talked with the patient she did not have the any of the information for the other family members so FRW set up a time to complete the application   2022:  FRW called patient and left a vm with call back information. FRW will make outreach next week    SNAP/CASH Application Screenin. Have you had [facility] benefits before? Yes   2. How many people in the household, do you eat/buy food together? 4   3. What is your monthly income (include all tax members)? 1600.00  4. Do you have a bank account? No   5. Do you have any utility bills (electricity, rent, mortgage, phone, insurance, medical bills, etc.)? Yes   6. Do you have social security cards and/or green cards?  Yes       Health Insurance:      Referral/Screening:  FRW Screening    Row Name 10/31/22 1053       [facility] Benefits   Is patient requesting help applying for [facility] benefits? Yes       Have you recently applied for any [facility] benefits? No       How many people in your household? 4       Do you buy/eat food together? Yes       What is the monthly gross income for the household (wages, social security, workers comp, and pension)?  1200       [place]:   [place] No       Is this an insurance renewal? No       Is this a new insurance application request? No       OTHER   Is this a selena care application? No       Any other information for the FRW? Patient would like to apply for [facility] benefits; SNAP and [place].

## 2022-11-15 ENCOUNTER — PATIENT OUTREACH (OUTPATIENT)
Dept: CARE COORDINATION | Facility: CLINIC | Age: 57
End: 2022-11-15

## 2022-11-15 NOTE — PROGRESS NOTES
Clinic Care Coordination Contact  Program: [facility]   County: [facility] Case #:  [facility] Worker:   Anne #:   Subscriber #:   Renewal:  Date Applied:     FRW Outreach:   11/15/2022-FRW called and completed online application over the phone FRW to follow up in 3 weeks   2022: FRW called and talked with the patient she did not have the any of the information for the other family members so FRW set up a time to complete the application   2022:  FRW called patient and left a vm with call back information. FRW will make outreach next week    SNAP/CASH Application Screenin. Have you had [facility] benefits before? Yes   2. How many people in the household, do you eat/buy food together? 4   3. What is your monthly income (include all tax members)? 1600.00  4. Do you have a bank account? No   5. Do you have any utility bills (electricity, rent, mortgage, phone, insurance, medical bills, etc.)? Yes   6. Do you have social security cards and/or green cards?  Yes       Health Insurance:      Referral/Screening:  FRW Screening    Row Name 10/31/22 1053       [facility] Benefits   Is patient requesting help applying for [facility] benefits? Yes       Have you recently applied for any [facility] benefits? No       How many people in your household? 4       Do you buy/eat food together? Yes       What is the monthly gross income for the household (wages, social security, workers comp, and pension)?  1200       [place]:   [place]? No       Is this an insurance renewal? No       Is this a new insurance application request? No       OTHER   Is this a selena care application? No       Any other information for the FRW? Patient would like to apply for [place] benefits; [place] and [place].

## 2022-12-05 ENCOUNTER — PATIENT OUTREACH (OUTPATIENT)
Dept: CARE COORDINATION | Facility: CLINIC | Age: 57
End: 2022-12-05

## 2022-12-05 NOTE — PROGRESS NOTES
Care Coordination Clinician Chart Review     Situation: Patient chart reviewed by care coordinator.?     Background: Initial assessment and enrollment to Care Coordination was 10/31?? Care plan(s) with patient-centered goal(s) were developed with participation from patient.? Lead CC handed patient off to CHW for continued outreach every 30 days.??     Assessment: Per chart review, patient outreach completed by CC CHW on 10/31? Patient is actively working to accomplish goal(s).? Patient's goal(s) remain(s) appropriate at this time.? Patient is not due for updated Plan of Care.? Annual assessment will be due 10/31/23.     Care Plan: Financial Wellbeing     Problem: Patient expresses financial resource strain     Priority: High    Goal: Create an action plan to increase financial stability     Start Date: 11/2/2022 Expected End Date: 12/30/2022    This Visit's Progress: 40%    Note:     Barriers: language  Strengths: has strong family support  Patient expressed understanding of goal: yes  Action steps to achieve this goal:  1. I will answer when FRW calls  2. I will answer the phone when disability services calls  3. I will reach out to CC if I need further assistance                        Plan/Recommendations: The patient will continue working with Care Coordination to achieve above goal(s).? CHW will involve Lead CC as needed or if patient is ready to move to maintenance.? Lead CC will continue to monitor CHW s monthly outreaches and progress to goal(s) every 6 weeks.    Plan of Care updated and sent to patient: No    JONATHAN Rosenthal, SW  Social Work Care Coordinator

## 2022-12-07 ENCOUNTER — PATIENT OUTREACH (OUTPATIENT)
Dept: CARE COORDINATION | Facility: CLINIC | Age: 57
End: 2022-12-07

## 2022-12-07 NOTE — PROGRESS NOTES
Clinic Care Coordination Contact  Program: [facility]: [facility] Case #:  [facility] Worker:   Anne #:   Subscriber #:   Renewal:  Date Applied:     FRW Outreach:   2022 FRW called and the patient needs to send in more documentation to the [facility]   11/15/2022-FRW called and completed online application over the phone FRW to follow up in 3 weeks   2022: FRW called and talked with the patient she did not have the any of the information for the other family members so FRW set up a time to complete the application   2022:  FRW called patient and left a vm with call back information. FRW will make outreach next week    SNAP/CASH Application Screenin. Have you had [facility] benefits before? Yes   2. How many people in the household, do you eat/buy food together? 4   3. What is your monthly income (include all tax members)? 1600.00  4. Do you have a bank account? No   5. Do you have any utility bills (electricity, rent, mortgage, phone, insurance, medical bills, etc.)? Yes   6. Do you have social security cards and/or green cards?  Yes       Health Insurance:      Referral/Screening:  FRW Screening    Row Name 10/31/22 1053       [facility] Benefits   Is patient requesting help applying for [facility] benefits? Yes       Have you recently applied for any [facility] benefits? No       How many people in your household? 4       Do you buy/eat food together? Yes       What is the monthly gross income for the household (wages, social security, workers comp, and pension)?  1200       [place]:   [place] No       Is this an insurance renewal? No       Is this a new insurance application request? No       OTHER   Is this a selena care application? No       Any other information for the FRW? Patient would like to apply for [place] benefits; [place] and [place].         Clinic Care Coordination Contact  Program: [facility]   County: [facility] Case #:  [facility] Worker:   Anne #:   Subscriber #:    Renewal:  Date Applied:     FRW Outreach:   11/15/2022-FRW called and completed online application over the phone FRW to follow up in 3 weeks   2022: FRW called and talked with the patient she did not have the any of the information for the other family members so FRW set up a time to complete the application   2022:  FRW called patient and left a  with call back information. FRW will make outreach next week    SNAP/CASH Application Screenin. Have you had [facility] benefits before? Yes   2. How many people in the household, do you eat/buy food together? 4   3. What is your monthly income (include all tax members)? 1600.00  4. Do you have a bank account? No   5. Do you have any utility bills (electricity, rent, mortgage, phone, insurance, medical bills, etc.)? Yes   6. Do you have social security cards and/or green cards?  Yes       Health Insurance:      Referral/Screening:  FRW Screening    Row Name 10/31/22 1053       [facility] Benefits   Is patient requesting help applying for [facility] benefits? Yes       Have you recently applied for any [facility] benefits? No       How many people in your household? 4       Do you buy/eat food together? Yes       What is the monthly gross income for the household (wages, social security, workers comp, and pension)?  1200       [place]:   [place]? No       Is this an insurance renewal? No       Is this a new insurance application request? No       OTHER   Is this a selena care application? No       Any other information for the FRW? Patient would like to apply for [facility] benefits; [place]NAP and [place].

## 2022-12-20 ENCOUNTER — PATIENT OUTREACH (OUTPATIENT)
Dept: CARE COORDINATION | Facility: CLINIC | Age: 57
End: 2022-12-20

## 2022-12-20 NOTE — PROGRESS NOTES
Clinic Care Coordination Contact    Community Health Worker Follow Up    Care Gaps:   Health Maintenance Due   Topic Date Due     ADVANCE CARE PLANNING  Never done     DTAP/TDAP/TD IMMUNIZATION (1 - Tdap) Never done     ZOSTER IMMUNIZATION (1 of 2) Never done     COVID-19 Vaccine (3 - Booster for Marlys series) 05/06/2022     YEARLY PREVENTIVE VISIT  06/24/2022     INFLUENZA VACCINE (1) 09/01/2022     HEPATITIS B IMMUNIZATION (2 of 3 - 3-dose series) 11/02/2022     Scheduled on 2/24/2023 for preventive care visit.       Care Plan:   Care Plan: Financial Wellbeing     Problem: Patient expresses financial resource strain     Priority: High    Goal: Create an action plan to increase financial stability     Start Date: 11/2/2022 Expected End Date: 12/30/2022    This Visit's Progress: 50% Recent Progress: 40%    Note:     Barriers: Language and lack of community resources.   Strengths: has strong family support  Patient expressed understanding of goal: yes.    Action steps to achieve this goal:  1. I submitted required documents to the [facility] on 12/08/2022.   2. I will continue answering my phone when FRW calls for follow up.   3. I will answer the phone when  calls.   4. I will update CCC team at outreach.     Goal update: 12/20/2022.                     Intervention and Education during outreach:  -Patient submitted all required documents to the [facility] on 12/08/2022 and has not receive updates yet.  -Patient is connected with  and has an assigned representative, Milka Marrero.  -Patient was explained to sign all forms/letter that highlighted in yellow areas and return to  with return envelopes.   -CHW reminded patient of upcoming appointments and assisted with preventive care visit.   -Patient was informed to call with questions or concerns.     CHW Next Outreach: In one month.

## 2022-12-23 ENCOUNTER — TRANSFERRED RECORDS (OUTPATIENT)
Dept: HEALTH INFORMATION MANAGEMENT | Facility: CLINIC | Age: 57
End: 2022-12-23

## 2022-12-23 ENCOUNTER — OFFICE VISIT (OUTPATIENT)
Dept: FAMILY MEDICINE | Facility: CLINIC | Age: 57
End: 2022-12-23
Payer: COMMERCIAL

## 2022-12-23 VITALS
DIASTOLIC BLOOD PRESSURE: 68 MMHG | WEIGHT: 148 LBS | BODY MASS INDEX: 27.23 KG/M2 | SYSTOLIC BLOOD PRESSURE: 124 MMHG | HEART RATE: 74 BPM | TEMPERATURE: 98.2 F | OXYGEN SATURATION: 95 % | HEIGHT: 62 IN

## 2022-12-23 DIAGNOSIS — M54.41 CHRONIC RIGHT-SIDED LOW BACK PAIN WITH RIGHT-SIDED SCIATICA: Primary | ICD-10-CM

## 2022-12-23 DIAGNOSIS — G89.29 CHRONIC RIGHT-SIDED LOW BACK PAIN WITH RIGHT-SIDED SCIATICA: Primary | ICD-10-CM

## 2022-12-23 PROCEDURE — 99214 OFFICE O/P EST MOD 30 MIN: CPT | Performed by: FAMILY MEDICINE

## 2022-12-23 RX ORDER — GABAPENTIN 100 MG/1
200 CAPSULE ORAL AT BEDTIME
Qty: 60 CAPSULE | Refills: 11 | Status: SHIPPED | OUTPATIENT
Start: 2022-12-23 | End: 2023-02-08

## 2022-12-23 ASSESSMENT — ENCOUNTER SYMPTOMS: BACK PAIN: 1

## 2022-12-23 NOTE — PROGRESS NOTES
"  Assessment & Plan     Chronic right-sided low back pain with right-sided sciatica    Not controlled.    Increase gabapentin to 200 mg at bedtime.    - gabapentin (NEURONTIN) 100 MG capsule  Dispense: 60 capsule; Refill: 11               BMI:   Estimated body mass index is 27.24 kg/m  as calculated from the following:    Height as of this encounter: 1.57 m (5' 1.81\").    Weight as of this encounter: 67.1 kg (148 lb).           Return in about 3 months (around 3/23/2023) for Back pain.    Campbell Honeycutt MD  [facility] DEREJE Hallman is a 57 year old accompanied by her son, presenting for the following health issues:  RECHECK (Body pain. R groin and lower back, radiating down R leg) and Back Pain (R groin and lower middle back, radiating down R leg)      Back Pain     History of Present Illness       Reason for visit:  Pca    She eats 0-1 servings of fruits and vegetables daily.She consumes 1 sweetened beverage(s) daily.She exercises with enough effort to increase her heart rate 10 to 19 minutes per day.  She exercises with enough effort to increase her heart rate 4 days per week.   She is taking medications regularly.       Always dizzy and sleepy, without gabapentin.  No change in those symptoms if she takes it.    Review of Systems   Musculoskeletal: Positive for back pain.            Objective    /68 (BP Location: Right arm, Cuff Size: Adult Regular)   Pulse 74   Temp 98.2  F (36.8  C) (Oral)   Ht 1.57 m (5' 1.81\")   Wt 67.1 kg (148 lb)   LMP 05/01/2021 (Within Weeks)   SpO2 95%   BMI 27.24 kg/m    Body mass index is 27.24 kg/m .  Physical Exam   Heart normal  Lungs normal                    "

## 2023-01-09 ENCOUNTER — PATIENT OUTREACH (OUTPATIENT)
Dept: CARE COORDINATION | Facility: CLINIC | Age: 58
End: 2023-01-09

## 2023-01-09 NOTE — PROGRESS NOTES
Clinic Care Coordination Contact    Care Coordination Clinician Chart Review    Situation: Patient chart reviewed by care coordinator.       Background: Care Coordination initial assessment and enrollment to Care Coordination was 10-31-22   Patient centered goals were developed with participation from patient.  SW/CC handed patient off to CHW for continued outreach every 30 days.        Assessment: Per chart review, patient outreach completed by CC CHW on 12-20-22.  Patient is actively working to accomplish goal.  Patient's goal remains appropriate and relevant at this time.   Patient is not due for updated Plan of Care.  Annual assessment will be due 10-31-23.      Care Plan: Financial Wellbeing     Problem: Patient expresses financial resource strain     Priority: High    Goal: Create an action plan to increase financial stability     Start Date: 11/2/2022 Expected End Date: 12/30/2022    This Visit's Progress: 50% Recent Progress: 40%    Note:     Barriers: Language and lack of community resources.   Strengths: has strong family support  Patient expressed understanding of goal: yes.    Action steps to achieve this goal:  1. I submitted required documents to the [facility] on 12/08/2022.   2. I will continue answering my phone when FRW calls for follow up.   3. I will answer the phone when  calls.   4. I will update CCC team at outreach.     Goal update: 12/20/2022.                             Plan/Recommendations: The patient will continue working with Care Coordination to achieve goal as above.  CHW will involve SW/CC as needed or if patient is ready to move to maintenance.  SW/CC will continue to monitor progress to goals and CHW outreaches every 6 weeks.   Plan of Care updated and mailed to patient: No    Landon Brantley, SW/CC, for Milka Flores, SW/CC  [facility] Care Coordination  [phone]

## 2023-01-10 ENCOUNTER — TELEPHONE (OUTPATIENT)
Dept: NURSING | Facility: CLINIC | Age: 58
End: 2023-01-10
Payer: COMMERCIAL

## 2023-01-10 NOTE — TELEPHONE ENCOUNTER
Son Alessandro was given verbal consent by pt to speak on her behalf.    Pt calls regarding gabapentin, pharmacy unable to dispense medication as needing clarification with instructions.     Disp Refills Start End SHASHANK   gabapentin (NEURONTIN) 100 MG capsule 60 capsule 11 12/23/2022  No   Sig - Route: Take 2 capsules (200 mg) by mouth At Bedtime [GABAPENTIN (NEURONTIN) 100 MG CAPSULE] Take 1 capsule at bedtime - Oral       Office visit notes on 12/23/22 states:  Increase gabapentin to 200 mg at bedtime.   - gabapentin (NEURONTIN) 100 MG capsule  Dispense: 60 capsule; Refill: 11    FNA called [facility] to clarify Rx. Pharmacist verbalized understanding and will dispense gabapentin 100 mg, take 2 tabs (200 mg) at bedtime.    Sending to care team as HAKAN Mohr RN/Lyle Nurse Advisor

## 2023-01-16 ENCOUNTER — PATIENT OUTREACH (OUTPATIENT)
Dept: CARE COORDINATION | Facility: CLINIC | Age: 58
End: 2023-01-16

## 2023-01-16 NOTE — PROGRESS NOTES
Clinic Care Coordination Contact  Program: [facility]   County: [facility] Case #:  [facility] Worker:   Anne #:   Subscriber #:   Renewal:  Date Applied:     FRW Outreach:   2023  FRW called patient and left a vm with call back information. FRW will make outreach next week  2022 FRW called and the patient needs to send in more documentation to the [facility]   11/15/2022-FRW called and completed online application over the phone FRW to follow up in 3 weeks   2022: FRW called and talked with the patient she did not have the any of the information for the other family members so FRW set up a time to complete the application   2022:  FRW called patient and left a vm with call back information. FRW will make outreach next week    SNAP/CASH Application Screenin. Have you had [facility] benefits before? Yes   2. How many people in the household, do you eat/buy food together? 4   3. What is your monthly income (include all tax members)? 1600.00  4. Do you have a bank account? No   5. Do you have any utility bills (electricity, rent, mortgage, phone, insurance, medical bills, etc.)? Yes   6. Do you have social security cards and/or green cards?  Yes       Health Insurance:      Referral/Screening:  FRW Screening    Row Name 10/31/22 105       [facility] Benefits   Is patient requesting help applying for [facility] benefits? Yes       Have you recently applied for any [facility] benefits? No       How many people in your household? 4       Do you buy/eat food together? Yes       What is the monthly gross income for the household (wages, social security, workers comp, and pension)?  1200       [place]:   [place] No       Is this an insurance renewal? No       Is this a new insurance application request? No       OTHER   Is this a selena care application? No       Any other information for the FRW? Patient would like to apply for [facility] benefits; SNAP and GA.         Clinic Care  Coordination Contact  Program: [facility]: [facility] Case #:  [facility] Worker:   Anne #:   Subscriber #:   Renewal:  Date Applied:     FRW Outreach:   11/15/2022-FRW called and completed online application over the phone FRW to follow up in 3 weeks   2022: FRW called and talked with the patient she did not have the any of the information for the other family members so FRW set up a time to complete the application   2022:  FRW called patient and left a vm with call back information. FRW will make outreach next week    SNAP/CASH Application Screenin. Have you had [facility] benefits before? Yes   2. How many people in the household, do you eat/buy food together? 4   3. What is your monthly income (include all tax members)? 1600.00  4. Do you have a bank account? No   5. Do you have any utility bills (electricity, rent, mortgage, phone, insurance, medical bills, etc.)? Yes   6. Do you have social security cards and/or green cards?  Yes       Health Insurance:      Referral/Screening:  FRW Screening    Row Name 10/31/22 1053       [facility] Benefits   Is patient requesting help applying for [facility] benefits? Yes       Have you recently applied for any [facility] benefits? No       How many people in your household? 4       Do you buy/eat food together? Yes       What is the monthly gross income for the household (wages, social security, workers comp, and pension)?  1200       [place]:   [place]? No       Is this an insurance renewal? No       Is this a new insurance application request? No       OTHER   Is this a selena care application? No       Any other information for the FRW? Patient would like to apply for County benefits; [place] and [place].

## 2023-01-19 ASSESSMENT — ENCOUNTER SYMPTOMS
JOINT SWELLING: 0
HEADACHES: 0
MYALGIAS: 0
ABDOMINAL PAIN: 0
DYSURIA: 0
WEAKNESS: 0
COUGH: 0
PALPITATIONS: 0
BREAST MASS: 0
SORE THROAT: 0
FREQUENCY: 0
HEARTBURN: 0
ARTHRALGIAS: 0
CONSTIPATION: 1
CHILLS: 0
DIZZINESS: 0
DIARRHEA: 0
NERVOUS/ANXIOUS: 0
EYE PAIN: 0
HEMATURIA: 0
NAUSEA: 0
FEVER: 0
HEMATOCHEZIA: 0
PARESTHESIAS: 0
SHORTNESS OF BREATH: 0

## 2023-01-20 ENCOUNTER — OFFICE VISIT (OUTPATIENT)
Dept: FAMILY MEDICINE | Facility: CLINIC | Age: 58
End: 2023-01-20
Payer: COMMERCIAL

## 2023-01-20 VITALS
HEIGHT: 62 IN | OXYGEN SATURATION: 97 % | DIASTOLIC BLOOD PRESSURE: 64 MMHG | HEART RATE: 68 BPM | TEMPERATURE: 98.3 F | BODY MASS INDEX: 26.82 KG/M2 | SYSTOLIC BLOOD PRESSURE: 110 MMHG | WEIGHT: 145.75 LBS

## 2023-01-20 DIAGNOSIS — K59.00 CONSTIPATION, UNSPECIFIED CONSTIPATION TYPE: ICD-10-CM

## 2023-01-20 DIAGNOSIS — Z00.00 ROUTINE GENERAL MEDICAL EXAMINATION AT A HEALTH CARE FACILITY: Primary | ICD-10-CM

## 2023-01-20 PROCEDURE — 99396 PREV VISIT EST AGE 40-64: CPT | Performed by: FAMILY MEDICINE

## 2023-01-20 RX ORDER — POLYETHYLENE GLYCOL 3350 17 G/17G
1 POWDER, FOR SOLUTION ORAL DAILY
Qty: 507 G | Refills: 11 | Status: SHIPPED | OUTPATIENT
Start: 2023-01-20

## 2023-01-20 ASSESSMENT — ENCOUNTER SYMPTOMS
HEARTBURN: 0
NERVOUS/ANXIOUS: 0
DIZZINESS: 0
NAUSEA: 0
FEVER: 0
BREAST MASS: 0
ABDOMINAL PAIN: 0
EYE PAIN: 0
CONSTIPATION: 1
DIARRHEA: 0
CHILLS: 0
WEAKNESS: 0
DYSURIA: 0
PALPITATIONS: 0
COUGH: 0
MYALGIAS: 0
SHORTNESS OF BREATH: 0
SORE THROAT: 0
HEMATOCHEZIA: 0
HEMATURIA: 0
HEADACHES: 0
FREQUENCY: 0
ARTHRALGIAS: 0
PARESTHESIAS: 0
JOINT SWELLING: 0

## 2023-01-20 NOTE — PROGRESS NOTES
SUBJECTIVE:   CC: Law is an 58 year old who presents for preventive health visit.     Patient has been advised of split billing requirements and indicates understanding: Yes  Healthy Habits:     Getting at least 3 servings of Calcium per day:  Yes    Bi-annual eye exam:  NO    Dental care twice a year:  NO    Sleep apnea or symptoms of sleep apnea:  Excessive snoring    Diet:  Low fat/cholesterol    Frequency of exercise:  1 day/week    Duration of exercise:  Less than 15 minutes    Taking medications regularly:  Yes    Medication side effects:  None    PHQ-2 Total Score: 2    Additional concerns today:  No      Constipation.  Had colonoscopy 2014.    Gets dizzy after 2 gabapentin.    Not fasting.     is back.        Today's PHQ-2 Score:   PHQ-2 ( 1999 Pf[employer]) 1/19/2023   Q1: Little interest or pleasure in doing things 1   Q2: Feeling down, depressed or hopeless 1   PHQ-2 Score 2   Q1: Little interest or pleasure in doing things Several days   Q2: Feeling down, depressed or hopeless Several days   PHQ-2 Score 2       Have you ever done Advance Care Planning? (For example, a Health Directive, POLST, or a discussion with a medical provider or your loved ones about your wishes): No, advance care planning information given to patient to review.  Patient declined advance care planning discussion at this time.    Social History     Tobacco Use     Smoking status: Never     Smokeless tobacco: Current     Types: Chew     Tobacco comments:     Betel Nut w/ tobacco   Substance Use Topics     Alcohol use: No         Alcohol Use 1/19/2023   Prescreen: >3 drinks/day or >7 drinks/week? Not Applicable       Reviewed orders with patient.  Reviewed health maintenance and updated orders accordingly - Yes      Breast Cancer Screening:    Breast CA Risk Assessment (FHS-7) 1/19/2023   Do you have a family history of breast, colon, or ovarian cancer? No / Unknown         Mammogram Screening: Recommended mammography every 1-2  years with patient discussion and risk factor consideration  Pertinent mammograms are reviewed under the imaging tab.    History of abnormal Pap smear: NO - age 30-65 PAP every 5 years with negative HPV co-testing recommended  PAP / HPV Latest Ref Rng & Units 6/24/2021 7/24/2015   PAP Negative for squamous intraepithelial lesion or malignancy. Negative for squamous intraepithelial lesion or malignancy  Electronically signed by Laina Ahumada CT (ASCP) on 7/2/2021 at  2:44 PM   Negative for squamous intraepithelial lesion or malignancy  Electronically signed by Zonia Hampton CT (ASCP) on 7/30/2015 at 12:18 PM     HPV16 NEG Negative -   HPV18 NEG Negative -   HRHPV NEG Negative -     Reviewed and updated as needed this visit by clinical staff   Tobacco   Meds              Reviewed and updated as needed this visit by Provider                 Current Outpatient Medications   Medication Sig Dispense Refill     acetaminophen (TYLENOL) 500 MG tablet Take 2 tablets (1,000 mg) by mouth every 8 hours as needed for mild pain 1000 tablet 0     acetaminophen (TYLENOL) 500 MG tablet [ACETAMINOPHEN (TYLENOL) 500 MG TABLET] Take 1 tablet (500 mg total) by mouth every 6 (six) hours as needed for pain. 100 tablet 11     gabapentin (NEURONTIN) 100 MG capsule Take 2 capsules (200 mg) by mouth At Bedtime [GABAPENTIN (NEURONTIN) 100 MG CAPSULE] Take 1 capsule at bedtime 60 capsule 11     hydrocortisone (CORTAID) 1 % external cream APPLY THIN LAYER TO BOTH ARMS TWICE A DAY       hydrocortisone-aloe 1 % CREA [HYDROCORTISONE WITH ALOE 1 % CREA CREAM] Apply thin layer to affected area twice a day 56 g 3     ibuprofen (ADVIL,MOTRIN) 400 MG tablet [IBUPROFEN (ADVIL,MOTRIN) 400 MG TABLET] Take 1 tablet (400 mg total) by mouth daily as needed for pain. 30 tablet 11     meclizine (ANTIVERT) 25 MG tablet Take 1 tablet (25 mg) by mouth 3 times daily as needed for dizziness or nausea 20 tablet 0     polyethylene glycol (MIRALAX) 17 GM/Dose  "powder Take 17 g (1 capful) by mouth daily 507 g 11     triamcinolone (KENALOG) 0.1 % external cream [TRIAMCINOLONE (KENALOG) 0.1 % CREAM] Apply 1 gm to arms 2 times daily 80 g 3         Review of Systems   Constitutional: Negative for chills and fever.   HENT: Negative for congestion, ear pain, hearing loss and sore throat.    Eyes: Negative for pain and visual disturbance.   Respiratory: Negative for cough and shortness of breath.    Cardiovascular: Negative for chest pain, palpitations and peripheral edema.   Gastrointestinal: Positive for constipation. Negative for abdominal pain, diarrhea, heartburn, hematochezia and nausea.   Breasts:  Negative for tenderness, breast mass and discharge.   Genitourinary: Negative for dysuria, frequency, genital sores, hematuria, pelvic pain, urgency, vaginal bleeding and vaginal discharge.   Musculoskeletal: Negative for arthralgias, joint swelling and myalgias.   Skin: Negative for rash.   Neurological: Negative for dizziness, weakness, headaches and paresthesias.   Psychiatric/Behavioral: Negative for mood changes. The patient is not nervous/anxious.           OBJECTIVE:   /64 (BP Location: Left arm, Cuff Size: Adult Regular)   Pulse 68   Temp 98.3  F (36.8  C) (Oral)   Ht 1.575 m (5' 2.01\")   Wt 66.1 kg (145 lb 12 oz)   LMP 05/01/2021 (Within Weeks)   SpO2 97%   BMI 26.65 kg/m    Physical Exam  Eyes: EOM full, pupils normal, conjunctivae normal  Ears: TM's and canals normal  Oropharynx: normal  Neck: supple without adenopathy or thyromegaly  Lungs: normal  Heart: regular rhythm, normal rate, no murmur  Abdomen: no HSM, mass or tenderness  Extremities: FROM, normal strength and sensation          ASSESSMENT/PLAN:   Law was seen today for physical.    Diagnoses and all orders for this visit:    Routine general medical examination at a health care facility    Constipation, unspecified constipation type  -     polyethylene glycol (MIRALAX) 17 GM/Dose powder; Take 17 " "g (1 capful) by mouth daily      She will take gabapentin twice daily as needed.    She declined labs today.    She declined Covid vaccine.      Patient has been advised of split billing requirements and indicates understanding: Yes      COUNSELING:  Reviewed preventive health counseling, as reflected in patient instructions       Regular exercise       Healthy diet/nutrition      BMI:   Estimated body mass index is 26.65 kg/m  as calculated from the following:    Height as of this encounter: 1.575 m (5' 2.01\").    Weight as of this encounter: 66.1 kg (145 lb 12 oz).         She reports that she has never smoked. Her smokeless tobacco use includes chew.            Campbell Honeycutt MD  [facility]  "

## 2023-01-31 ENCOUNTER — PATIENT OUTREACH (OUTPATIENT)
Dept: CARE COORDINATION | Facility: CLINIC | Age: 58
End: 2023-01-31
Payer: COMMERCIAL

## 2023-01-31 NOTE — PROGRESS NOTES
Clinic Care Coordination Contact  Program: [facility]   County: [facility] Case #:  [facility] Worker:   Birdieure #:   Subscriber #:   Renewal:  Date Applied:     FRW Outreach:   2023 FRW is going to call next week patient stated that she has not heard from the [facility] after she submitted her documentation   2023  FRW called patient and left a vm with call back information. FRW will make outreach next week  2022 FRW called and the patient needs to send in more documentation to the [facility]   11/15/2022-FRW called and completed online application over the phone FRW to follow up in 3 weeks   2022: FRW called and talked with the patient she did not have the any of the information for the other family members so FRW set up a time to complete the application   2022:  FRW called patient and left a vm with call back information. FRW will make outreach next week    SNAP/CASH Application Screenin. Have you had [facility] benefits before? Yes   2. How many people in the household, do you eat/buy food together? 4   3. What is your monthly income (include all tax members)? 1600.00  4. Do you have a bank account? No   5. Do you have any utility bills (electricity, rent, mortgage, phone, insurance, medical bills, etc.)? Yes   6. Do you have social security cards and/or green cards?  Yes       Health Insurance:      Referral/Screening:  FRW Screening    Row Name 10/31/22 1053       [facility] Benefits   Is patient requesting help applying for [facility] benefits? Yes       Have you recently applied for any [facility] benefits? No       How many people in your household? 4       Do you buy/eat food together? Yes       What is the monthly gross income for the household (wages, social security, workers comp, and pension)?  1200       [place]:   [place]       Is this an insurance renewal? No       Is this a new insurance application request? No       OTHER   Is this a selena care  application? No       Any other information for the FRW? Patient would like to apply for County benefits; [place] and [place].

## 2023-02-07 ENCOUNTER — PATIENT OUTREACH (OUTPATIENT)
Dept: CARE COORDINATION | Facility: CLINIC | Age: 58
End: 2023-02-07
Payer: COMMERCIAL

## 2023-02-07 NOTE — PROGRESS NOTES
Clinic Care Coordination Contact  Program: [facility]   County: [facility] Case #:  [employer] Worker:   Anne #:   Subscriber #:   Renewal:  Date Applied:     FRW Outreach:   2023  FRW called patient and was unable to leave vm with call back information. FRW will make outreach next week  2023 FRW is going to call next week patient stated that she has not heard from the [facility] after she submitted her documentation   2023  FRW called patient and left a vm with call back information. FRW will make outreach next week  2022 FRW called and the patient needs to send in more documentation to the [facility]   11/15/2022-FRW called and completed online application over the phone FRW to follow up in 3 weeks   2022: FRW called and talked with the patient she did not have the any of the information for the other family members so FRW set up a time to complete the application   2022:  FRW called patient and left a vm with call back information. FRW will make outreach next week    SNAP/CASH Application Screenin. Have you had [facility] benefits before? Yes   2. How many people in the household, do you eat/buy food together? 4   3. What is your monthly income (include all tax members)? 1600.00  4. Do you have a bank account? No   5. Do you have any utility bills (electricity, rent, mortgage, phone, insurance, medical bills, etc.)? Yes   6. Do you have social security cards and/or green cards?  Yes       Health Insurance:      Referral/Screening:  FRW Screening    Row Name 10/31/22 1053       [employer] Benefits   Is patient requesting help applying for [facility] benefits? Yes       Have you recently applied for any [facility] benefits? No       How many people in your household? 4       Do you buy/eat food together? Yes       What is the monthly gross income for the household (wages, social security, workers comp, and pension)?  1200       [place]:   [place] No       Is this an  insurance renewal? No       Is this a new insurance application request? No       OTHER   Is this a selena care application? No       Any other information for the FRW? Patient would like to apply for [place] benefits; [place] and [place].

## 2023-02-08 DIAGNOSIS — M54.41 CHRONIC RIGHT-SIDED LOW BACK PAIN WITH RIGHT-SIDED SCIATICA: ICD-10-CM

## 2023-02-08 DIAGNOSIS — G89.29 CHRONIC RIGHT-SIDED LOW BACK PAIN WITH RIGHT-SIDED SCIATICA: ICD-10-CM

## 2023-02-08 RX ORDER — GABAPENTIN 100 MG/1
100 CAPSULE ORAL 2 TIMES DAILY
Qty: 60 CAPSULE | Refills: 11 | Status: SHIPPED | OUTPATIENT
Start: 2023-02-08 | End: 2024-05-24

## 2023-02-15 ENCOUNTER — PATIENT OUTREACH (OUTPATIENT)
Dept: CARE COORDINATION | Facility: CLINIC | Age: 58
End: 2023-02-15
Payer: COMMERCIAL

## 2023-02-15 NOTE — PROGRESS NOTES
Clinic Care Coordination Contact  Community Health Worker Follow Up    Care Gaps:     Health Maintenance Due   Topic Date Due     DTAP/TDAP/TD IMMUNIZATION (1 - Tdap) Never done     ZOSTER IMMUNIZATION (1 of 2) Never done     COVID-19 Vaccine (3 - Booster for Marlys series) 05/06/2022     INFLUENZA VACCINE (1) 09/01/2022     HEPATITIS B IMMUNIZATION (2 of 3 - 19+ 3-dose series) 11/02/2022     Postponed to future outreach     Care Plan:   Care Plan: Financial Wellbeing     Problem: Patient expresses financial resource strain     Priority: High    Goal: Create an action plan to increase financial stability     Start Date: 11/2/2022 Expected End Date: 12/30/2022    This Visit's Progress: 60% Recent Progress: 50%    Note:     Barriers: Language and lack of community resources.   Strengths: has strong family support  Patient expressed understanding of goal: yes.    Action steps to achieve this goal:  1. I submitted required documents to the [facility] on 12/08/2022.   2. I will continue answering my phone when FRW calls for follow up.   3. I will answer the phone when  calls.   4. I will update CCC team at outreach.     Goal update: 12/20/2022                    Intervention and Education during outreach: CHW spoke to patient about current FRW goal via [employer] .  Patient stated that she is still in need of assistance for an update on the documents she submitted to the [facility].     CHW Plan:     will send FRW message regarding patient's request to receive outreach calls after 3pm when her daughter is home from school.  Patient feels this would be beneficial for her to better understand the call and information/support being provided.     will outreach in 1 month to discuss progress to goal(s)   - apply for SNAP with FRW assistance   - social security disability   - 11/2/22  CCSW placed referral to disability specialists    will address existing care gaps and inquire if patient needs  assistance scheduling future appts.    Chart review: 2/7/2023 FRW Outreach: was unable to leave  with call back information. FRW will make outreach next week    Alejandra SANCHEZ  Community Health Worker  [facility]  [facility] Coordination  [facility]  [email or web address]   Office: [phone]

## 2023-02-20 ENCOUNTER — PATIENT OUTREACH (OUTPATIENT)
Dept: CARE COORDINATION | Facility: CLINIC | Age: 58
End: 2023-02-20
Payer: COMMERCIAL

## 2023-02-20 NOTE — LETTER
[place] CARE COORDINATION    February 20, 2023        Law Menah  [address] [place] [address]      Dear Law,      Attached is an updated complex care plan for your continued enrollment in clinic care coordination.     Please let us know if you have additional questions, goals, or concerns that we can assist with.     BRIDGET Sibley Care Coordination      [facility]  Patient Centered Plan of Care  About Me:        Patient Name:  Law Cruz    YOB: 1965  Age:         58 year old   Jesus MRN:    [id number] Telephone Information:  Home Phone [phone]   Mobile [phone]       Address:  [address]  [place] [address] Email address:  No e-mail address on record      Emergency Contact(s)    Name Relationship Lgl Grd Work Phone Home Phone Mobile Phone   1. BRUNO,DIEGO Son   [phone]    2. BRUNO,SAY Son   [phone]            Primary language:  Yany      needed? Yes   [facility]:  [phone] op. 1  Other communication barriers:Language barrier  Preferred Method of Communication:     Current living arrangement: I live in a private home with family    Mobility Status/ Medical Equipment:   Health Maintenance Due   Topic Date Due     DTAP/TDAP/TD IMMUNIZATION (1 - Tdap) Never done     ZOSTER IMMUNIZATION (1 of 2) Never done     COVID-19 Vaccine (3 - Booster for Marlys series) 05/06/2022     INFLUENZA VACCINE (1) 09/01/2022     HEPATITIS B IMMUNIZATION (2 of 3 - 19+ 3-dose series) 11/02/2022     Health Maintenance  Health Maintenance Reviewed: Due/Overdue   Health Maintenance Due   Topic Date Due     DTAP/TDAP/TD IMMUNIZATION (1 - Tdap) Never done     ZOSTER IMMUNIZATION (1 of 2) Never done     COVID-19 Vaccine (3 - Booster for Marlys series) 05/06/2022     INFLUENZA VACCINE (1) 09/01/2022     HEPATITIS B IMMUNIZATION (2 of 3 - 19+ 3-dose series) 11/02/2022     My Access Plan  Medical Emergency 911   Primary Clinic Line M  [facility] [phone]   24 Hour Appointment Line [phone] or  [phone]) (toll-free)   24 Hour Nurse Line [phone] (toll-free)   Preferred Urgent Care [facility] [phone]     Preferred Hospital [facility]  [phone]     Preferred Pharmacy [facility] - [place], [place] - [address] Behavioral Health Crisis Line The National Suicide Prevention Lifeline at 1-273.151.5296 or Text/Call 948       My Care Team Members  Patient Care Team       Relationship Specialty Notifications Start End    Campbell Honeycutt MD PCP - General   12/19/11     Phone: [phone] Fax: [phone]         [address] [place] [address]    Milka Flores LGSW Lead Care Coordinator  Admissions 10/31/22     Christina Cooney MA Financial Resource Worker   10/31/22     Campbell Honeycutt MD Assigned PCP   11/5/22     Phone: [phone] Fax: [phone]         [address] [place] [address]    Janae Yepez MA Community Health Worker  Admissions 12/28/22             My Care Plans  Self Management and Treatment Plan  Care Plan  Care Plan: Financial Wellbeing     Problem: Patient expresses financial resource strain     Priority: High    Goal: Create an action plan to increase financial stability     Start Date: 11/2/2022 Expected End Date: 12/30/2022    This Visit's Progress: 60% Recent Progress: 50%    Note:     Barriers: Language and lack of community resources.   Strengths: has strong family support  Patient expressed understanding of goal: yes.    Action steps to achieve this goal:  1. I submitted required documents to the [facility] on 12/08/2022.   2. I will continue answering my phone when FRW calls for follow up.   3. I will answer the phone when  calls.   4. I will update [facility] team at outreach.     Goal update: 12/20/2022                         Action Plans  on File:                       Advance Care Plans/Directives Type:   No data recorded    Honoring Choices    Advance Care Planning and Health Care Directives  When it comes to decisions about your health care, it s important that your voice is heard. You may not always be able to speak for yourself.    We encourage you to have discussions with your loved ones, cultural or spiritual leaders and health care providers about your goals, values, beliefs and choices.    We are a part of [employer] [place] , supporting and promoting the benefits of advance care planning conversations.    Our goals are to:    Help you make informed decisions about your healthcare choices and ensure that those choices are honored    Offer advance care planning discussions with trained staff    Ensure your choices are clearly defined, documented and available in your medical record    Translate your choices into medical orders as needed    Why is Advance Care Planning important?    Know what your health care choices are and decide what is right for you    An unexpected illness or injury could leave you unable to participate in important treatment decisions    When choices are left to others to decide that responsibility can be difficult and stressful    By discussing and outlining your choices, your voice is heard in the care you want to receive    How can I learn more?  We offer free classes at multiple locations, days and times. Our trained facilitators will provide information and guide you through a Health Care Directive document. They can also review, notarize and add your document to your medical record.    Call [facility] at [phone] or toll free at [phone] for assistance.      My Medical and Care Information  Problem List   Patient Active Problem List   Diagnosis     Carpal Tunnel Syndrome     Anemia     Backache     Right-sided low back pain with right-sided sciatica     Memory loss     Meningioma (H)       Current Medications and Allergies:    Current Outpatient Medications   Medication Instructions     acetaminophen (TYLENOL) 500 mg, Oral, EVERY 6 HOURS PRN     acetaminophen (TYLENOL) 1,000 mg, Oral, EVERY 8 HOURS PRN     gabapentin (NEURONTIN) 100 mg, Oral, 2 TIMES DAILY     hydrocortisone (CORTAID) 1 % external cream APPLY THIN LAYER TO BOTH ARMS TWICE A DAY     hydrocortisone-aloe 1 % CREA [HYDROCORTISONE WITH ALOE 1 % CREA CREAM] Apply thin layer to affected area twice a day     ibuprofen (ADVIL/MOTRIN) 400 mg, Oral, DAILY PRN     meclizine (ANTIVERT) 25 mg, Oral, 3 TIMES DAILY PRN     polyethylene glycol (MIRALAX) 17 GM/Dose powder 1 capful., Oral, DAILY     triamcinolone (KENALOG) 0.1 % external cream [TRIAMCINOLONE (KENALOG) 0.1 % CREAM] Apply 1 gm to arms 2 times daily     Care Coordination Start Date: 10/29/2022   Frequency of Care Coordination: monthly     Form Last Updated: 02/20/2023

## 2023-02-20 NOTE — PROGRESS NOTES
Clinic Care Coordination Contact  Care Coordination Clinician Chart Review    Situation: Patient chart reviewed by Care Coordinator.       Background: Care Coordination Program started: 10/29/2022. Initial assessment completed 10/31/22 and patient-centered care plan(s) were developed with participation from patient. Lead CC handed patient off to CHW for continued outreaches.       Assessment: Per chart review, patient outreach completed by CC CHW on 2/15/23. Patient is actively working to accomplish goal(s). Patient's goal(s) appropriate and relevant at this time.     Patient is due for updated Plan of Care.      Assessments will be completed annually or as needed/with change of patient status. Due 10/31/23.        Care Plan: Financial Wellbeing     Problem: Patient expresses financial resource strain     Priority: High    Goal: Create an action plan to increase financial stability     Start Date: 11/2/2022 Expected End Date: 12/30/2022    This Visit's Progress: 60% Recent Progress: 50%    Note:     Barriers: Language and lack of community resources.   Strengths: has strong family support  Patient expressed understanding of goal: yes.    Action steps to achieve this goal:  1. I submitted required documents to the [facility] on 12/08/2022.   2. I will continue answering my phone when FRW calls for follow up.   3. I will answer the phone when  calls.   4. I will update CCC team at outreach.     Goal update: 12/20/2022                           Plan/Recommendations: The patient will continue working with Care Coordination to achieve goal(s) as above.     CHW will continue outreaches at minimum every 30 days and will involve Lead CC as needed or if patient is ready to move to Maintenance.     Lead CC will continue to monitor CHW outreaches and patient's progress to goal(s) every 6 weeks.     Plan of Care updated and sent to patient: Yes, via mail    Beverly Bran LSW   Social Work Primary Care  Clinic Care Coordinator   [facility]

## 2023-02-24 ENCOUNTER — OFFICE VISIT (OUTPATIENT)
Dept: FAMILY MEDICINE | Facility: CLINIC | Age: 58
End: 2023-02-24
Payer: COMMERCIAL

## 2023-02-24 VITALS
OXYGEN SATURATION: 99 % | SYSTOLIC BLOOD PRESSURE: 108 MMHG | BODY MASS INDEX: 27.47 KG/M2 | WEIGHT: 149.25 LBS | TEMPERATURE: 97.5 F | DIASTOLIC BLOOD PRESSURE: 76 MMHG | HEART RATE: 76 BPM | HEIGHT: 62 IN | RESPIRATION RATE: 16 BRPM

## 2023-02-24 DIAGNOSIS — Z12.31 VISIT FOR SCREENING MAMMOGRAM: ICD-10-CM

## 2023-02-24 DIAGNOSIS — Z00.00 ROUTINE GENERAL MEDICAL EXAMINATION AT A HEALTH CARE FACILITY: Primary | ICD-10-CM

## 2023-02-24 DIAGNOSIS — D32.9 MENINGIOMA (H): ICD-10-CM

## 2023-02-24 DIAGNOSIS — G89.29 CHRONIC LEFT-SIDED LOW BACK PAIN WITH LEFT-SIDED SCIATICA: ICD-10-CM

## 2023-02-24 DIAGNOSIS — M54.42 CHRONIC LEFT-SIDED LOW BACK PAIN WITH LEFT-SIDED SCIATICA: ICD-10-CM

## 2023-02-24 PROCEDURE — 99213 OFFICE O/P EST LOW 20 MIN: CPT | Mod: 25 | Performed by: FAMILY MEDICINE

## 2023-02-24 PROCEDURE — 99396 PREV VISIT EST AGE 40-64: CPT | Performed by: FAMILY MEDICINE

## 2023-02-24 ASSESSMENT — ENCOUNTER SYMPTOMS
ARTHRALGIAS: 1
HEMATURIA: 0
NAUSEA: 0
HEADACHES: 0
DIARRHEA: 0
FREQUENCY: 0
SHORTNESS OF BREATH: 0
DYSURIA: 0
PALPITATIONS: 0
DIZZINESS: 0
COUGH: 0
HEMATOCHEZIA: 0
NERVOUS/ANXIOUS: 0
CONSTIPATION: 0
PARESTHESIAS: 1
MYALGIAS: 0
ABDOMINAL PAIN: 0
FEVER: 0
WEAKNESS: 1
CHILLS: 0
EYE PAIN: 0
JOINT SWELLING: 0
HEARTBURN: 0
SORE THROAT: 0
BREAST MASS: 0

## 2023-02-24 NOTE — PROGRESS NOTES
"   SUBJECTIVE:   CC:  is an 58 year old who presents for preventive health visit.   Patient has been advised of split billing requirements and indicates understanding: Yes  Healthy Habits:     Getting at least 3 servings of Calcium per day:  Yes    Bi-annual eye exam:  NO    Dental care twice a year:  NO    Sleep apnea or symptoms of sleep apnea:  None    Diet:  Regular (no restrictions)    Duration of exercise:  15-30 minutes    Taking medications regularly:  Yes    Medication side effects:  None    PHQ-2 Total Score: 0    Additional concerns today:  Yes    She did PT in the past for her left lower back pain (in 2016) and it did help. She would be willing to try this again. Takes gabapentin for this as well.     Has ongoing RLQ pain \"for 10 years.\" If she has to strain in order to have a bowel movement, the pain is worse. She does take Miralax and this helps.     She actually already had a physical appointment with Dr. Honeycutt in January 2023 so did not need a physical today.     Today's PHQ-2 Score:   PHQ-2 ( 1999 [employer]) 2/24/2023   Q1: Little interest or pleasure in doing things 0   Q2: Feeling down, depressed or hopeless 0   PHQ-2 Score 0   Q1: Little interest or pleasure in doing things Not at all   Q2: Feeling down, depressed or hopeless Not at all   PHQ-2 Score 0           Social History     Tobacco Use     Smoking status: Never     Smokeless tobacco: Current     Types: Chew     Tobacco comments:     Betel Nut w/ tobacco   Substance Use Topics     Alcohol use: No     If you drink alcohol do you typically have >3 drinks per day or >7 drinks per week? No    Alcohol Use 2/24/2023   Prescreen: >3 drinks/day or >7 drinks/week? No       Reviewed orders with patient.  Reviewed health maintenance and updated orders accordingly - Yes      Breast Cancer Screening:    Breast CA Risk Assessment (FHS-7) 1/19/2023 2/24/2023   Do you have a family history of breast, colon, or ovarian cancer? No / Unknown No / Unknown " "      click delete button to remove this line now  Mammogram Screening: Recommended mammography every 1-2 years with patient discussion and risk factor consideration  Pertinent mammograms are reviewed under the imaging tab.    PAP / HPV Latest Ref Rng & Units 6/24/2021 7/24/2015   PAP Negative for squamous intraepithelial lesion or malignancy. Negative for squamous intraepithelial lesion or malignancy  Electronically signed by Laina Ahumada, CT (ASCP) on 7/2/2021 at  2:44 PM   Negative for squamous intraepithelial lesion or malignancy  Electronically signed by Zonia Hampton, CT (ASCP) on 7/30/2015 at 12:18 PM     HPV16 NEG Negative -   HPV18 NEG Negative -   HRHPV NEG Negative -     Reviewed and updated as needed this visit by clinical staff   Tobacco  Allergies  Meds              Reviewed and updated as needed this visit by Provider                     Review of Systems  CONSTITUTIONAL: NEGATIVE for fever, chills, change in weight  INTEGUMENTARY/SKIN: NEGATIVE for worrisome rashes, moles or lesions  EYES: NEGATIVE for vision changes or irritation  ENT: NEGATIVE for ear, mouth and throat problems  RESP: NEGATIVE for significant cough or SOB  BREAST: NEGATIVE for masses, tenderness or discharge  CV: NEGATIVE for chest pain, palpitations or peripheral edema  GI: NEGATIVE for nausea, abdominal pain, heartburn, or change in bowel habits  : NEGATIVE for unusual urinary or vaginal symptoms. No vaginal bleeding.  MUSCULOSKELETAL: NEGATIVE for significant arthralgias or myalgia  NEURO: NEGATIVE for weakness, dizziness or paresthesias  PSYCHIATRIC: NEGATIVE for changes in mood or affect      OBJECTIVE:   /76 (BP Location: Left arm, Patient Position: Sitting, Cuff Size: Adult Regular)   Pulse 76   Temp 97.5  F (36.4  C) (Oral)   Resp 16   Ht 1.575 m (5' 2.01\")   Wt 67.7 kg (149 lb 4 oz)   LMP 05/01/2021 (Within Weeks)   SpO2 99%   BMI 27.29 kg/m    Physical Exam  GENERAL: healthy, alert and no " "distress  MS: no gross musculoskeletal defects noted, no edema  SKIN: no suspicious lesions or rashes  NEURO: Normal strength and tone, mentation intact and speech normal  PSYCH: mentation appears normal, affect normal/bright    Diagnostic Test Results:  Labs reviewed in Epic    ASSESSMENT/PLAN:   Law was seen today for physical.    Diagnoses and all orders for this visit:    Routine general medical examination at a health care facility    Chronic left-sided low back pain with left-sided sciatica: agreed to try PT again, referral placed.     Meningioma (H): not having ongoing headaches right now, will defer to primary regarding repeating any scans. Last MRI was [id number].     Visit for screening mammogram; ordered today        Patient has been advised of split billing requirements and indicates understanding: Yes      COUNSELING:  Reviewed preventive health counseling, as reflected in patient instructions      BMI:   Estimated body mass index is 27.29 kg/m  as calculated from the following:    Height as of this encounter: 1.575 m (5' 2.01\").    Weight as of this encounter: 67.7 kg (149 lb 4 oz).   Weight management plan: Discussed healthy diet and exercise guidelines      She reports that she has never smoked. Her smokeless tobacco use includes chew.      Sheila Doll MD  [facility]  "

## 2023-02-28 ENCOUNTER — HOSPITAL ENCOUNTER (OUTPATIENT)
Dept: PHYSICAL THERAPY | Facility: REHABILITATION | Age: 58
Discharge: HOME | End: 2023-02-28
Attending: FAMILY MEDICINE
Payer: COMMERCIAL

## 2023-02-28 DIAGNOSIS — G89.29 CHRONIC LEFT-SIDED LOW BACK PAIN WITH LEFT-SIDED SCIATICA: ICD-10-CM

## 2023-02-28 DIAGNOSIS — M54.42 CHRONIC LEFT-SIDED LOW BACK PAIN WITH LEFT-SIDED SCIATICA: ICD-10-CM

## 2023-02-28 PROCEDURE — 97161 PT EVAL LOW COMPLEX 20 MIN: CPT | Mod: GP

## 2023-02-28 PROCEDURE — 97110 THERAPEUTIC EXERCISES: CPT | Mod: GP

## 2023-02-28 PROCEDURE — 97140 MANUAL THERAPY 1/> REGIONS: CPT | Mod: GP

## 2023-03-01 NOTE — PROGRESS NOTES
[facility]    OUTPATIENT PHYSICAL THERAPY ORTHOPEDIC EVALUATION  PLAN OF TREATMENT FOR OUTPATIENT REHABILITATION  (COMPLETE FOR INITIAL CLAIMS ONLY)  Patient's Last Name, First Name, M.I.  YOB: 1965  Anthony,Law       Provider s Name:  [facility]   Medical Record No.  [id number]   Start of Care Date:  02/28/23   Onset Date:   2/24/2023   Type:     _X__PT   ___OT   ___SLP Medical Diagnosis:  M54.42, G89.29 (ICD-10-CM) - Chronic left-sided low back pain with left-sided sciatica     PT Diagnosis:  L lumbar radiculopathy   Visits from SOC:  1      _________________________________________________________________________________  Plan of Treatment/Functional Goals:  balance training, joint mobilization, manual therapy, strengthening, stretching           Goals  Goal Identifier: 1  Goal Description: Patient will be independent with HEP in order to demonstrate ability to modulate symptoms and gain strength.  Target Date: 03/28/23    Goal Identifier: 2  Goal Description: Patient will tolerate lifting from the floor with no increase in symptoms in order to demonstrate improved function.  Target Date: 04/18/23    Goal Identifier: 3  Goal Description: Patient will report 0/10 pain while sleeping over night in order to demonstrate improved impairments.  Target Date: 05/09/23                                                           Therapy Frequency:     Predicted Duration of Therapy Intervention:  1-2x/wk for 10 weeks    Ramy Holt, PT                 I CERTIFY THE NEED FOR THESE SERVICES FURNISHED UNDER        THIS PLAN OF TREATMENT AND WHILE UNDER MY CARE     (Physician co-signature of this document indicates review and certification of the therapy plan).                     Certification Date From:  02/28/23   Certification Date To:  05/09/23    Referring Provider:  Sharmila  Sheila SANCHEZ MD    Initial Assessment        See Epic Evaluation Start of Care Date: 02/28/23 02/28/23 1000   General Information   Type of Visit Initial OP Ortho PT Evaluation   Start of Care Date 02/28/23   Referring Physician Sheila Doll MD   Orders Evaluate and Treat   Date of Order 02/24/23   Certification Required? Yes   Medical Diagnosis M54.42, G89.29 (ICD-10-CM) - Chronic left-sided low back pain with left-sided sciatica   Surgical/Medical history reviewed Yes   Precautions/Limitations no known precautions/limitations       Present Yes   Language Other   Body Part(s)   Body Part(s) Lumbar Spine/SI   Presentation and Etiology   Pertinent history of current problem (include personal factors and/or comorbidities that impact the POC) Patient reports to clinic with a chief complaint of low back pain with radiating pain. Personal medical history includes two miscarriages. No imaging on file. Aggravating factors include lifting and prolonged standing. Easing factors include none. Impairments include pain, limited lumbar ROM, and global hip weakness. Functional limitations include lifting groceries, sitting for long periods, and driving.   Fall Risk Screen   Fall screen completed by PT   Have you fallen 2 or more times in the past year? No   Have you fallen and had an injury in the past year? No   Abuse Screen (yes response referral indicated)   Feels Unsafe at Home or Work/School no   Feels Threatened by Someone no   Does Anyone Try to Keep You From Having Contact with Others or Doing Things Outside Your Home? no   Physical Signs of Abuse Present no   Lumbar Spine/SI Objective Findings   Flexion ROM Mod loss   Extension ROM Mod loss   Right Side Bending ROM Min loss w/ pain   Left Side Bending ROM Min loss w/ pain   Hip Flexion (L2) Strength R/L 4/4   Hip Abduction Strength R/L 4/4   Hip Adduction Strength R/L 4/4   Hip Extension Strength R/L -4/0-4   Knee Flexion Strength R/L  5/5   SLR + L   Spring Test Symptoms reproduced L3-L5   Segmental Mobility Hypomobile PA L1-L5   Palpation TTP parapsinals L>R   Slump Test + L   Posture decreased lumbar lordosis   Planned Therapy Interventions   Planned Therapy Interventions balance training;joint mobilization;manual therapy;strengthening;stretching   Clinical Impression   Criteria for Skilled Therapeutic Interventions Met yes, treatment indicated   PT Diagnosis L lumbar radiculopathy   Clinical Presentation Stable/Uncomplicated   Clinical Decision Making (Complexity) Low complexity   Predicted Duration of Therapy Intervention (days/wks) 1-2x/wk for 10 weeks   Risk & Benefits of therapy have been explained Yes   Patient, Family & other staff in agreement with plan of care Yes   Clinical Impression Comments Patient reports to clinic with a chief complaint of low back pain with radiating pain. Personal medical history includes two miscarriages. No imaging on file. Aggravating factors include lifting and prolonged standing. Easing factors include none. Impairments include pain, limited lumbar ROM, and global hip weakness. Functional limitations include lifting groceries, sitting for long periods, and driving. Signs and symptoms are consistent with L lumbar radiculopathy. Patient would benefit from skilled physical therapy in order to reduce pain, improve impairments, gain strength, and restore function.   Education Assessment   Preferred Learning Style Listening;Demonstration   Barriers to Learning Language   ORTHO GOALS   PT Ortho Eval Goals 1;2;3   Ortho Goal 1   Goal Identifier 1   Goal Description Patient will be independent with HEP in order to demonstrate ability to modulate symptoms and gain strength.   Target Date 03/28/23   Ortho Goal 2   Goal Identifier 2   Goal Description Patient will tolerate lifting from the floor with no increase in symptoms in order to demonstrate improved function.   Target Date 04/18/23   Ortho Goal 3   Goal  Identifier 3   Goal Description Patient will report 0/10 pain while sleeping over night in order to demonstrate improved impairments.   Target Date 05/09/23   Total Evaluation Time   PT Eval, Low Complexity Minutes (60603) 20   Therapy Certification   Certification date from 02/28/23   Certification date to 05/09/23   Medical Diagnosis M54.42, G89.29 (ICD-10-CM) - Chronic left-sided low back pain with left-sided sciatica         Ramy Holt, PT on 3/1/2023 at 11:53 AM

## 2023-03-23 ENCOUNTER — PATIENT OUTREACH (OUTPATIENT)
Dept: CARE COORDINATION | Facility: CLINIC | Age: 58
End: 2023-03-23
Payer: COMMERCIAL

## 2023-03-23 ENCOUNTER — CARE COORDINATION (OUTPATIENT)
Dept: CARE COORDINATION | Facility: CLINIC | Age: 58
End: 2023-03-23
Payer: COMMERCIAL

## 2023-03-23 NOTE — PROGRESS NOTES
Clinic Care Coordination Contact  Program: [facility]: [facility] Case #:  [facility] Worker:   Anne #:   Subscriber #:   Renewal:  Date Applied:     FRW Outreach:   3/23/23 FRW called patient and left a final vm with call back information as attempt x2 with no answer or return phone calls. FRW closed the FRW program and remove patient from panel. Patient can be referred back to FRW if needed.      2023  FRW called patient and was unable to leave vm with call back information. FRW will make outreach next week  2023 FRW is going to call next week patient stated that she has not heard from the [facility] after she submitted her documentation   2023  FRW called patient and left a vm with call back information. FRW will make outreach next week  2022 FRW called and the patient needs to send in more documentation to the [facility]   11/15/2022-FRW called and completed online application over the phone FRW to follow up in 3 weeks   2022: FRW called and talked with the patient she did not have the any of the information for the other family members so FRW set up a time to complete the application   2022:  FRW called patient and left a vm with call back information. FRW will make outreach next week    SNAP/CASH Application Screenin. Have you had [facility] benefits before? Yes   2. How many people in the household, do you eat/buy food together? 4   3. What is your monthly income (include all tax members)? 1600.00  4. Do you have a bank account? No   5. Do you have any utility bills (electricity, rent, mortgage, phone, insurance, medical bills, etc.)? Yes   6. Do you have social security cards and/or green cards?  Yes       Health Insurance:      Referral/Screening:  FRW Screening    Row Name 10/31/22 1051       County Benefits   Is patient requesting help applying for [facility] benefits? Yes       Have you recently applied for any [facility] benefits? No       How many people in your household? 4        Do you buy/eat food together? Yes       What is the monthly gross income for the household (wages, social security, workers comp, and pension)?  1200       Insurance:   [place]? No       Is this an insurance renewal? No       Is this a new insurance application request? No       OTHER   Is this a [place] care application? No       Any other information for the FRW? Patient would like to apply [place] and [place]

## 2023-03-23 NOTE — PROGRESS NOTES
Clinic Care Coordination Contact      ** CHW has been unable to connect with [facility] . [facility]  on 03/27/2023.    HANANE Regalado  [facility]  Clinic Care Coordination  [facility]  [phone]

## 2023-03-29 ENCOUNTER — PATIENT OUTREACH (OUTPATIENT)
Dept: CARE COORDINATION | Facility: CLINIC | Age: 58
End: 2023-03-29
Payer: COMMERCIAL

## 2023-03-29 NOTE — PROGRESS NOTES
Clinic Care Coordination Contact    Community Health Worker Follow Up    Care Gaps:     Health Maintenance Due   Topic Date Due     DTAP/TDAP/TD IMMUNIZATION (1 - Tdap) Never done     ZOSTER IMMUNIZATION (1 of 2) Never done     COVID-19 Vaccine (3 - Booster for Marlys series) 05/06/2022     INFLUENZA VACCINE (1) 09/01/2022     HEPATITIS B IMMUNIZATION (2 of 3 - 19+ 3-dose series) 11/02/2022     MAMMO SCREENING  04/23/2023       Postponed to next CHW outreach.     Care Plan:   Care Plan: Financial Wellbeing     Problem: Patient expresses financial resource strain     Priority: High    Goal: Create an action plan to increase financial stability     Start Date: 11/2/2022 Expected End Date: 12/30/2022    This Visit's Progress: 40% Recent Progress: 60%    Note:     Barriers: Language and lack of community resources.   Strengths: has strong family support  Patient expressed understanding of goal: yes.    Action steps to achieve this goal:  1. I submitted required documents to the [facility] on 12/08/2022.   2. I will continue answering my phone when FRW calls for follow up.   3. I will answer the phone when  calls.   4. I will update CCC team at outreach.     Goal update: 12/20/2022                        ** CHW spoke to patient who has not yet connected with FRW. CHW conducted FRW screening with patient but she was unsure of the monthly gross income for her household, as there are three working individuals in her home but she only knew one person's income. CHW let patient know to obtain the financial information from the other members of her household to provide to FRW.    Intervention and Education during outreach: CHW encouraged patient to reach out to [facility] if any needs arise.    CHW Plan: next CHW outreach in one month on 04/28/2023 and Financial Resource Worker Screening    County Benefits  Is patient requesting help applying for county benefits?: Yes  Have you recently applied for any county  "benefits?: Yes  What was applied for? : SNAP, CASH  How many people in your household?: 5  Do you buy/eat food together?: Yes  What is the monthly gross income for the household (wages, social security, workers comp, and pension)? : 1200 (patient is unsure of the monthly gross income of the entire household. She stated that the \"father\" makes around $1200 but there are two others in the house who make money and she is not sure of what they make.)    [place]:  [place] No  Is this an insurance renewal?: No  Is this a new insurance application request?: No    Any other information for the FRW?:  (patient is unsure of the monthly gross income of the entire household. She stated that the \"father\" makes around $1200 but there are two others in the house who make money and she is not sure of what they make.)    Care Coordination team will tell patient:   Thank you for answering all the questions, based on screening questions, our Financial Resource Worker will reach out to you with additional questions and next steps.    HANANE Regalado  [facility] Care Coordination  [facility]  [phone]        "

## 2023-04-04 ENCOUNTER — PATIENT OUTREACH (OUTPATIENT)
Dept: CARE COORDINATION | Facility: CLINIC | Age: 58
End: 2023-04-04
Payer: COMMERCIAL

## 2023-04-04 NOTE — PROGRESS NOTES
"Clinic Care Coordination Contact  Program:  County:  [facility] Case #:  [facility] Worker:   Birdieure #:   Subscriber #:   Renewal:  Date Applied:     FRW Outreach:   23 FRW called and set up time to complete application over the phone on  and FRW also told the patient what we will need to know all income information and family members full name and Social numbers.   SNAP/CASH Application Screenin. Have you had [facility] benefits before? Yes   2. How many people in the household, do you eat/buy food together? 5   3. What is your monthly income (include all tax members)?  600+2400= 3000   4. Do you have a bank account?   5. Do you have any utility bills (electricity, rent, mortgage, phone, insurance, medical bills, etc.)?   6. Do you have social security cards and/or green cards?       Health Insurance:      Referral/Screening:  FRW Screening    Row Name [phone]       [facility] Benefits   Is patient requesting help applying for [facility] benefits? Yes       Have you recently applied for any [facility] benefits? Yes       What was applied for?  SNAP;CASH       How many people in your household? 5       Do you buy/eat food together? Yes       What is the monthly gross income for the household (wages, social security, workers comp, and pension)?  1200  patient is unsure of the monthly gross income of the entire household. She stated that the \"father\" makes around $1200 but there are two others in the house who make money and she is not sure of what they make.       [place]:   [place] No       Is this an insurance renewal? No       Is this a new insurance application request? No       OTHER   Any other information for the FRW? --  patient is unsure of the monthly gross income of the entire household. She stated that the \"father\" makes around $1200 but there are two others in the house who make money and she is not sure of what they make.             "

## 2023-04-11 ENCOUNTER — PATIENT OUTREACH (OUTPATIENT)
Dept: CARE COORDINATION | Facility: CLINIC | Age: 58
End: 2023-04-11
Payer: COMMERCIAL

## 2023-04-11 NOTE — PROGRESS NOTES
"Clinic Care Coordination Contact  Program:  County:  [facility] Case #:  [facility] Worker:   Birdieure #:   Subscriber #:   Renewal:  Date Applied:     FRW Outreach:   23  FRW called patient and left a vm with call back information. FRW will make outreach next week    23 FRW called and set up time to complete application over the phone on  and FRW also told the patient what we will need to know all income information and family members full name and Social numbers.   SNAP/CASH Application Screenin. Have you had [facility] benefits before? Yes   2. How many people in the household, do you eat/buy food together? 5   3. What is your monthly income (include all tax members)?  600+2400= 3000   4. Do you have a bank account?   5. Do you have any utility bills (electricity, rent, mortgage, phone, insurance, medical bills, etc.)?   6. Do you have social security cards and/or green cards?       Health Insurance:      Referral/Screening:  FRW Screening    Row Name 23 [id number]       [facility] Benefits   Is patient requesting help applying for [facility] benefits? Yes       Have you recently applied for any [facility] benefits? Yes       What was applied for?  SNAP;CASH       How many people in your household? 5       Do you buy/eat food together? Yes       What is the monthly gross income for the household (wages, social security, workers comp, and pension)?  1200  patient is unsure of the monthly gross income of the entire household. She stated that the \"father\" makes around $1200 but there are two others in the house who make money and she is not sure of what they make.       [place]:   [place] No       Is this an insurance renewal? No       Is this a new insurance application request? No       OTHER   Any other information for the FRW? --  patient is unsure of the monthly gross income of the entire household. She stated that the \"father\" makes around $1200 but there are two others in the " house who make money and she is not sure of what they make.

## 2023-04-17 ENCOUNTER — PATIENT OUTREACH (OUTPATIENT)
Dept: CARE COORDINATION | Facility: CLINIC | Age: 58
End: 2023-04-17
Payer: COMMERCIAL

## 2023-04-17 NOTE — PROGRESS NOTES
"Clinic Care Coordination Contact  Program:  County:  [facility] Case #:  [facility] Worker:   Mnsure #:   Subscriber #:   Renewal:  Date Applied:     FRW Outreach:   23  FRW called patient attempt x2 and left a vm with call back information. FRW will make outreach next week  23  FRW called patient and left a vm with call back information. FRW will make outreach next week  23 FRW called and set up time to complete application over the phone on  and FRW also told the patient what we will need to know all income information and family members full name and Social numbers.   SNAP/CASH Application Screenin. Have you had [facility] benefits before? Yes   2. How many people in the household, do you eat/buy food together? 5   3. What is your monthly income (include all tax members)?  600+2400= 3000   4. Do you have a bank account?   5. Do you have any utility bills (electricity, rent, mortgage, phone, insurance, medical bills, etc.)?   6. Do you have social security cards and/or green cards?       Health Insurance:      Referral/Screening:  FRW Screening    Row Name 23 [id number]       [facility] Benefits   Is patient requesting help applying for [facility] benefits? Yes       Have you recently applied for any [facility] benefits? Yes       What was applied for?  SNAP;CASH       How many people in your household? 5       Do you buy/eat food together? Yes       What is the monthly gross income for the household (wages, social security, workers comp, and pension)?  1200  patient is unsure of the monthly gross income of the entire household. She stated that the \"father\" makes around $1200 but there are two others in the house who make money and she is not sure of what they make.       [place]:   [place] No       Is this an insurance renewal? No       Is this a new insurance application request? No       OTHER   Any other information for the FRW? --  patient is unsure of the monthly gross " "income of the entire household. She stated that the \"father\" makes around $1200 but there are two others in the house who make money and she is not sure of what they make.             "

## 2023-04-18 ENCOUNTER — PATIENT OUTREACH (OUTPATIENT)
Dept: CARE COORDINATION | Facility: CLINIC | Age: 58
End: 2023-04-18
Payer: COMMERCIAL

## 2023-04-18 NOTE — PROGRESS NOTES
Clinic Care Coordination Contact  Care Coordination Clinician Chart Review    Situation: Patient chart reviewed by Care Coordinator.       Background: Care Coordination Program started: 10/29/2022. Initial assessment completed 10/31/22 and patient-centered care plan(s) were developed with participation from patient. Lead CC handed patient off to CHW for continued outreaches.       Assessment: Per chart review, patient outreach completed by CC CHW on 3/29/23. Patient is actively working to accomplish goal(s). Patient's goal(s) appropriate and relevant at this time.     Patient is not due for updated Plan of Care.      Assessments will be completed annually or as needed/with change of patient status. Due 10/31/23.         Care Plan: Financial Wellbeing     Problem: Patient expresses financial resource strain     Priority: High    Goal: Create an action plan to increase financial stability     Start Date: 11/2/2022 Expected End Date: 12/30/2022    This Visit's Progress: 40% Recent Progress: 60%    Note:     Barriers: Language and lack of community resources.   Strengths: has strong family support  Patient expressed understanding of goal: yes.    Action steps to achieve this goal:  1. I submitted required documents to the County on 12/08/2022.   2. I will continue answering my phone when FRW calls for follow up.   3. I will answer the phone when  calls.   4. I will update CCC team at outreach.     Goal update: 12/20/2022                           Plan/Recommendations: The patient will continue working with Care Coordination to achieve goal(s) as above.     CHW will continue outreaches at minimum every 30 days and will involve Lead CC as needed or if patient is ready to move to Maintenance.     Lead CC will continue to monitor CHW outreaches and patient's progress to goal(s) every 6 weeks.     Plan of Care updated and sent to patient: No    LEIGH Ivey   [facility]  Care Coordinator

## 2023-04-24 ENCOUNTER — OFFICE VISIT (OUTPATIENT)
Dept: FAMILY MEDICINE | Facility: CLINIC | Age: 58
End: 2023-04-24
Payer: COMMERCIAL

## 2023-04-24 ENCOUNTER — ANCILLARY PROCEDURE (OUTPATIENT)
Dept: MAMMOGRAPHY | Facility: CLINIC | Age: 58
End: 2023-04-24
Attending: FAMILY MEDICINE
Payer: COMMERCIAL

## 2023-04-24 VITALS
HEIGHT: 62 IN | RESPIRATION RATE: 20 BRPM | DIASTOLIC BLOOD PRESSURE: 73 MMHG | TEMPERATURE: 99 F | WEIGHT: 150 LBS | SYSTOLIC BLOOD PRESSURE: 111 MMHG | OXYGEN SATURATION: 97 % | HEART RATE: 73 BPM | BODY MASS INDEX: 27.6 KG/M2

## 2023-04-24 DIAGNOSIS — G89.29 CHRONIC BILATERAL LOW BACK PAIN WITH BILATERAL SCIATICA: Primary | ICD-10-CM

## 2023-04-24 DIAGNOSIS — M51.26 LUMBAR DISC HERNIATION: ICD-10-CM

## 2023-04-24 DIAGNOSIS — M54.42 CHRONIC BILATERAL LOW BACK PAIN WITH BILATERAL SCIATICA: Primary | ICD-10-CM

## 2023-04-24 DIAGNOSIS — M54.41 CHRONIC BILATERAL LOW BACK PAIN WITH BILATERAL SCIATICA: Primary | ICD-10-CM

## 2023-04-24 DIAGNOSIS — Z12.31 VISIT FOR SCREENING MAMMOGRAM: ICD-10-CM

## 2023-04-24 PROCEDURE — 77067 SCR MAMMO BI INCL CAD: CPT | Mod: TC | Performed by: STUDENT IN AN ORGANIZED HEALTH CARE EDUCATION/TRAINING PROGRAM

## 2023-04-24 PROCEDURE — 99214 OFFICE O/P EST MOD 30 MIN: CPT | Performed by: FAMILY MEDICINE

## 2023-04-24 ASSESSMENT — ENCOUNTER SYMPTOMS: BACK PAIN: 1

## 2023-04-24 NOTE — PROGRESS NOTES
Assessment & Plan     Chronic bilateral low back pain with bilateral sciatica    Worsening.    - MR Lumbar Spine w/o Contrast  - Spine  Referral  - PRIMARY CARE FOLLOW-UP SCHEDULING    Lumbar disc herniation    Chronic.    - MR Lumbar Spine w/o Contrast  - Spine  Referral        36 minutes spent by me on the date of the encounter doing chart review, review of test results and patient visit regarding low back pain, disc herniation.           Campbell Honeycutt MD  [facility] DEREJE Hallman is a 58 year old, presenting for the following health issues:  Back Pain        4/24/2023    12:46 PM   Additional Questions   Roomed by katarina STAFFORD     Back Pain     History of Present Illness       Back Pain:  She presents for follow up of back pain. Patient's back pain is a chronic problem.  Location of back pain:  Right lower back and left lower back  Description of back pain: burning and dull ache  Back pain spreads: right thigh and left thigh    Since patient first noticed back pain, pain is: unchanged  Does back pain interfere with her job:  No      She eats 0-1 servings of fruits and vegetables daily.She consumes 1 sweetened beverage(s) daily.She exercises with enough effort to increase her heart rate 10 to 19 minutes per day.  She exercises with enough effort to increase her heart rate 7 days per week.   She is taking medications regularly.       Low back pain, worsening.  MR in 2016 demonstrated disc herniation at L4-5 and L5-S1.  She saw spine care clinic then.  She takes gabapentin once per day, at varying times of the day.  It helps.  She occasionally uses heat.    Current Outpatient Medications   Medication Sig Dispense Refill     acetaminophen (TYLENOL) 500 MG tablet Take 2 tablets (1,000 mg) by mouth every 8 hours as needed for mild pain 1000 tablet 0     acetaminophen (TYLENOL) 500 MG tablet [ACETAMINOPHEN (TYLENOL) 500 MG TABLET] Take 1 tablet (500 mg total) by mouth every  "6 (six) hours as needed for pain. 100 tablet 11     gabapentin (NEURONTIN) 100 MG capsule Take 1 capsule (100 mg) by mouth 2 times daily 60 capsule 11     hydrocortisone (CORTAID) 1 % external cream APPLY THIN LAYER TO BOTH ARMS TWICE A DAY       hydrocortisone-aloe 1 % CREA [HYDROCORTISONE WITH ALOE 1 % CREA CREAM] Apply thin layer to affected area twice a day 56 g 3     ibuprofen (ADVIL,MOTRIN) 400 MG tablet [IBUPROFEN (ADVIL,MOTRIN) 400 MG TABLET] Take 1 tablet (400 mg total) by mouth daily as needed for pain. 30 tablet 11     meclizine (ANTIVERT) 25 MG tablet Take 1 tablet (25 mg) by mouth 3 times daily as needed for dizziness or nausea 20 tablet 0     polyethylene glycol (MIRALAX) 17 GM/Dose powder Take 17 g (1 capful) by mouth daily 507 g 11     triamcinolone (KENALOG) 0.1 % external cream [TRIAMCINOLONE (KENALOG) 0.1 % CREAM] Apply 1 gm to arms 2 times daily 80 g 3           Review of Systems   Musculoskeletal: Positive for back pain.            Objective    /73 (BP Location: Left arm)   Pulse 73   Temp 99  F (37.2  C)   Resp 20   Ht 1.575 m (5' 2\")   Wt 68 kg (150 lb)   LMP 05/01/2021 (Within Weeks)   SpO2 97%   BMI 27.44 kg/m    Body mass index is 27.44 kg/m .  Physical Exam   Heart normal  Lungs normal  Straight leg raise: pain from lower back to foot, bilateral                    "

## 2023-04-26 ENCOUNTER — PATIENT OUTREACH (OUTPATIENT)
Dept: CARE COORDINATION | Facility: CLINIC | Age: 58
End: 2023-04-26
Payer: COMMERCIAL

## 2023-04-26 NOTE — PROGRESS NOTES
"Clinic Care Coordination Contact  Program:  County:  [facility] Case #:  [facility] Worker:   Birdieure #:   Subscriber #:   Renewal:  Date Applied:     FRW Outreach:   23 FRW called patient and left a final vm with call back information as attempt x3 with no answer or return phone calls. FRW closed the FRW program and remove patient from panel. Patient can be referred back to FRW if needed.      23  FRW called patient attempt x2 and left a vm with call back information. FRW will make outreach next week  23  FRW called patient and left a vm with call back information. FRW will make outreach next week  23 FRW called and set up time to complete application over the phone on  and FRW also told the patient what we will need to know all income information and family members full name and Social numbers.   SNAP/CASH Application Screenin. Have you had [facility] benefits before? Yes   2. How many people in the household, do you eat/buy food together? 5   3. What is your monthly income (include all tax members)?  600+2400= 3000   4. Do you have a bank account?   5. Do you have any utility bills (electricity, rent, mortgage, phone, insurance, medical bills, etc.)?   6. Do you have social security cards and/or green cards?       Health Insurance:      Referral/Screening:  FRW Screening    Row Name 23 [phone]       [facility] Benefits   Is patient requesting help applying for [facility] benefits? Yes       Have you recently applied for any [facility] benefits? Yes       What was applied for?  SNAP;CASH       How many people in your household? 5       Do you buy/eat food together? Yes       What is the monthly gross income for the household (wages, social security, workers comp, and pension)?  1200  patient is unsure of the monthly gross income of the entire household. She stated that the \"father\" makes around $1200 but there are two others in the house who make money and she is not sure of what they make.       Insurance: " "  [place] No       Is this an insurance renewal? No       Is this a new insurance application request? No       OTHER   Any other information for the FRW? --  patient is unsure of the monthly gross income of the entire household. She stated that the \"father\" makes around $1200 but there are two others in the house who make money and she is not sure of what they make.             "

## 2023-05-04 ENCOUNTER — PATIENT OUTREACH (OUTPATIENT)
Dept: CARE COORDINATION | Facility: CLINIC | Age: 58
End: 2023-05-04
Payer: COMMERCIAL

## 2023-05-04 NOTE — PROGRESS NOTES
Clinic Care Coordination Contact    Community Health Worker Follow Up    Care Gaps:     Health Maintenance Due   Topic Date Due     DTAP/TDAP/TD IMMUNIZATION (1 - Tdap) Never done     ZOSTER IMMUNIZATION (1 of 2) Never done     COVID-19 Vaccine (3 - Booster for Marlys series) 05/06/2022     INFLUENZA VACCINE (1) 09/01/2022     HEPATITIS B IMMUNIZATION (2 of 3 - 19+ 3-dose series) 11/02/2022       Postponed to next CHW outreach.     Care Plan:   Care Plan: Financial Wellbeing     Problem: Patient expresses financial resource strain     Priority: High    Goal: Create an action plan to increase financial stability     Start Date: 11/2/2022 Expected End Date: 12/30/2022    This Visit's Progress: 30% Recent Progress: 40%    Note:     Barriers: Language and lack of community resources.   Strengths: has strong family support  Patient expressed understanding of goal: yes.    Action steps to achieve this goal:  1. I submitted required documents to the County on 12/08/2022.   2. I will continue answering my phone when FRW calls for follow up.   3. I will answer the phone when  calls.   4. I will update CCC team at outreach.     Goal update: 12/20/2022                        ** CHW spoke to patient who has not yet connected with FRW. CHW conducted FRW screening with patient but she was unsure of the monthly gross income for her household, as there are three working individuals in her home but she only knew one person's income. CHW reiterated to patient that FRW will need to know income for entire household in order to move forward with patient's referral. Patient was able to obtain her two sons' monthly incomes. CHW stressed to patient the importance of answering FRW phone call to move forward with application. CHW placed FRW referral.     Intervention and Education during outreach: CHW encouraged patient to reach out to CCC if any needs arise.    CHW Plan: next CHW outreach in one month on  06/06/2023.    HANANE Regalado  [facility] Care Coordination  [facility]  [phone]    Intervention and Education during outreach: CHW encouraged patient to reach out to CCC if any needs arise.    CHW Plan: CHW and Financial Resource Worker Screening    [facility] Benefits  Is patient requesting help applying for [facility] benefits?: Yes  Have you recently applied for any county benefits?: No  What was applied for? : SNAP, CASH  How many people in your household?: 5  Do you buy/eat food together?: Yes  What is the monthly gross income for the household (wages, social security, workers comp, and pension)? : 7500    [place]:  [place] No  Is this an insurance renewal?: No  Is this a new insurance application request?: No    Any other information for the FRW?: Patient needs Jose D/Yany     Care Coordination team will tell patient:   Thank you for answering all the questions, based on screening questions, our Financial Resource Worker will reach out to you with additional questions and next steps.    HANANE Regalado  [facility] Care Coordination  [facility]  [phone]

## 2023-05-05 ENCOUNTER — PATIENT OUTREACH (OUTPATIENT)
Dept: CARE COORDINATION | Facility: CLINIC | Age: 58
End: 2023-05-05
Payer: COMMERCIAL

## 2023-05-05 NOTE — PROGRESS NOTES
Clinic Care Coordination Contact  Program:  County:  [facility] Case #:  [facility] Worker:   Mnsure #:   Subscriber #:   Renewal:  Date Applied:     FRW Outreach:   23 FRW called Patient and we went over screening questions. Patient was not sure what the house hold income was at this time and so she is going to work on getting that information for the FRW and we set up a time to complete screening and maybe apply for benefits if needed.    SNAP/CASH Application Screenin. Have you had [facility] benefits before? Yes   2. How many people in the household, do you eat/buy food together? 5  3. What is your monthly income (include all tax members)?   4. Do you have a bank account?   5. Do you have any utility bills (electricity, rent, mortgage, phone, insurance, medical bills, etc.)?   6. Do you have social security cards and/or green cards?       Health Insurance:      Referral/Screening:  FRW Screening    Row Name 23 [phone]       [facility] Benefits   Is patient requesting help applying for [facility] benefits? Yes       Have you recently applied for any [facility] benefits? No       What was applied for?  SNAP;CASH       How many people in your household? 5       Do you buy/eat food together? Yes       What is the monthly gross income for the household (wages, social security, workers comp, and pension)?  7500       [place]:   [place]? No       Is this an insurance renewal? No       Is this a new insurance application request? No       OTHER   Is this a selena care application? No       Any other information for the FRW? Patient needs Jose D/Yany

## 2023-05-09 ENCOUNTER — PATIENT OUTREACH (OUTPATIENT)
Dept: CARE COORDINATION | Facility: CLINIC | Age: 58
End: 2023-05-09
Payer: COMMERCIAL

## 2023-05-09 NOTE — PROGRESS NOTES
Clinic Care Coordination Contact  Program:  County:  [facility] Case #:  [facility] Worker:   Birdieure #:   Subscriber #:   Renewal:  Date Applied:     FRW Outreach:   23  FRW called patient and left a vm with call back information. FRW will make outreach next week  23 FRW called Patient and we went over screening questions. Patient was not sure what the house hold income was at this time and so she is going to work on getting that information for the FRW and we set up a time to complete screening and maybe apply for benefits if needed.    SNAP/CASH Application Screenin. Have you had [facility] benefits before? Yes   2. How many people in the household, do you eat/buy food together? 5  3. What is your monthly income (include all tax members)?   4. Do you have a bank account?   5. Do you have any utility bills (electricity, rent, mortgage, phone, insurance, medical bills, etc.)?   6. Do you have social security cards and/or green cards?       Health Insurance:      Referral/Screening:  FRW Screening    Row Name 23 [address] [place] [address] Benefits   Is patient requesting help applying for [facility] benefits? Yes       Have you recently applied for any [facility] benefits? No       What was applied for?  SNAP;CASH       How many people in your household? 5       Do you buy/eat food together? Yes       What is the monthly gross income for the household (wages, social security, workers comp, and pension)?  7500       Insurance:   [place]? No       Is this an insurance renewal? No       Is this a new insurance application request? No       OTHER   Is this a selena care application? No       Any other information for the FRW? Patient needs Jose D/Yany

## 2023-05-11 NOTE — PROGRESS NOTES
[facility]    Outpatient Physical Therapy Discharge Note  Patient: Law Cruz  : 1965    Beginning/End Dates of Reporting Period:  2023 to 2023    Referring Provider: Sheila Doll MD    Therapy Diagnosis: L Lumbar Radiculopathy      Client Self Report: Patient reports to clinic with a chief complaint of low back pain with radiating pain. Personal medical history includes two miscarriages. No imaging on file. Aggravating factors include lifting and prolonged standing. Easing factors include none. Impairments include pain, limited lumbar ROM, and global hip weakness. Functional limitations include lifting groceries, sitting for long periods, and driving.    Objective Measurements:  Objective Measure: Oswestry Disability Index  Details:     Goals:  Goal Identifier 1   Goal Description Patient will be independent with HEP in order to demonstrate ability to modulate symptoms and gain strength.   Target Date 23   Date Met      Progress (detail required for progress note):       Goal Identifier 2   Goal Description Patient will tolerate lifting from the floor with no increase in symptoms in order to demonstrate improved function.   Target Date 23   Date Met      Progress (detail required for progress note):       Goal Identifier 3   Goal Description Patient will report 0/10 pain while sleeping over night in order to demonstrate improved impairments.   Target Date 23   Date Met      Progress (detail required for progress note):           Plan:  Discharge from therapy.    Discharge:    Reason for Discharge: Patient did not return for treatment.       Discharge Plan: patient did not return to establish plan.         Ramy Holt, PT on 2023 at 9:17 AM

## 2023-05-11 NOTE — ADDENDUM NOTE
Encounter addended by: Ramy Holt, PT on: 5/11/2023 9:17 AM   Actions taken: Clinical Note Signed, Episode resolved

## 2023-05-15 NOTE — PROGRESS NOTES
Patient seen at the request of Dr Honeycutt in family medicine for an opinion and evaluation of Chronic bilateral low back pain with bilateral sciatica.      HISTORY OF PRESENT ILLNESS:  Law Cruz is a 58 year old female who presents with a chief complaint of low back pain with sciatica.  Visit completed with  assistance.  Her son was present as well.    She reports a history of chronic pain that started about 10 years ago.  The pain affects her low back R>L. She says sometimes the back pain radiates to the right groin. At times the pain radiates down the side and back of her legs into the feet (R>L).      She also reports a longstanding history of abdominal pain that she says started after her having a miscarriage 10 years ago. She says when the abdominal pain is bad, it exacerbates the leg pain. She says the abdominal discomfort is what bothers her the most today.  She describes the abdominal pain as constant and mostly mild for the last 10 years.  However when she does heavy lifting there is some increased abdominal pain.  She reports occasional constipation but her last bowel movement was this morning. She denies nausea or vomiting.    The pain is worse with heavy lifting, turning, or with riding in a car.  When she sitting still she does not have much pain.  She uses paracetamol and heat for the pain.  She does not use much gabapentin because it causes her to feel lightheaded.    She denies falls, weakness, bowel incontinence, saddle anesthesia, unintentional weight loss, fevers/chills, or drenching night sweats. She endorses some leaking urine if she is very full after eating and sometimes says she can't always tell if she needs to urinate, but says that has been the case x10 years.     PRIOR INJURIES/TREATMENT:   Ice/Heat: heat  Brace: none  Physical Therapy:   Did PT 1-2 years ago for low back pain     - Current Pain Medications -   Paracetamol  Gabapentin - infrequently due to lightheadedness    -  Prior/Trialed Pain Medications -   ibuprofen    Prior Procedures:  Date    Procedure   Improvement (%)  none              Prior Related Surgery: none   Other (acupuncture, OMT, CMM, TENS, DME, etc.): none      Specialists Seen - (with most recent, available notes and clinic visits reviewed)   1. none    IMAGING - reviewed   MR LUMBAR SPINE WO CONTRAST  6/21/2016   FINDINGS: Nomenclature is based on 5 lumbar type vertebral bodies. The conus ends at L1. Mild levocurvature of the lumbar spine. Vertebral body heights are maintained. Nonpathologic marrow signal. The sacroiliac joints are grossly within normal limits.   No MRI evidence for pars defects. The paraspinal soft tissues are grossly within normal limits. The distal abdominal aorta has normal diameter. The psoas musculature are within normal limits. Borderline congenitally narrow spinal canal.     T12-L1: Normal intervertebral disc height and signal. No disc herniation. No facet arthropathy. No superimposed spinal canal narrowing. No neuroforaminal narrowing.     L1-L2: Normal intervertebral disc height and signal. Mild posterior annular bulge. No facet arthropathy. No superimposed spinal canal narrowing. No neuroforaminal narrowing.     L2-L3: Mild intervertebral disc height loss. Loss of the normal T2 signal within the disc. Broad-based disc bulge. Mild bilateral facet arthropathy. Mild narrowing of the ventral spinal canal. Encroachment of the inferior neuroforamina without   significant neuroforaminal narrowing.     L3-L4: Normal intervertebral disc height and mild loss of the normal T2 signal within the disc. Broad-based disc bulge. Mild bilateral facet arthropathy. No superimposed spinal canal narrowing. No neuroforaminal narrowing.     L4-L5: Normal intervertebral disc height. Loss of the normal T2 signal within the disc. Broad-based disc bulge with superimposed small central disc fusion and annular fissure. Moderate bilateral facet arthropathy. Mild to  moderate spinal canal   Narrowing.  Narrowing of the lateral recesses bilaterally. Probable impingement of the bilateral traversing L5 nerve roots in the lateral recesses. Mild right neuroforaminal narrowing. Mild to moderate left neuroforaminal narrowing.     L5-S1: Normal intervertebral disc height. Loss of the normal T2 signal within the disc. Broad-based disc bulge with superimposed left central/left paracentral disc protrusion. Moderate bilateral facet arthropathy. Effacement of the lateral recesses,   left greater than right. No right neuroforaminal narrowing. Mild left neuroforaminal narrowing.      IMPRESSION:  CONCLUSION:  1.  Mild to moderate spinal canal narrowing with effacement of the lateral recesses bilaterally at L4-L5. Probable impingement of the bilateral traversing L5 nerve roots in the lateral recesses of L4-L5.   2.  Effacement of the lateral recesses at L5-S1, left greater than right.  3.  Mild to moderate left and mild right neuroforaminal narrowing at L4-L5.  4.  Moderate bilateral facet arthropathy at L4-L5 and L5-S1.     KEY IMAGE(S):  Sagittal T2-weighted image(s): 9 and 12.  Axial whole-spine T2-weighted image(s): 31.    US PELVIS COMPLETE W TRANSVAGINAL AND DOPPLER LIMITED: 10/18/2022   IMPRESSION:    1.  Normal pelvic ultrasound.      Review Of Systems:  I am responding to those symptoms which are directly relevant to the specific indication for my consultation. I recommend that the patient follow up with their primary or referring provider to pursue any other symptoms which may be of concern.       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     She  has no past surgical history on file.    Family History  Her family history is not on file.     Social History:  Work: She states that  she has not worked since arriving in [place] and that she is not able to perform any strenuous activity or ride in a vehicle without pain.  She  reports that she has never smoked. Her smokeless tobacco use includes chew. She reports that she does not drink alcohol and does not use drugs.        Current Medications:   She has a current medication list which includes the following prescription(s): acetaminophen, acetaminophen, gabapentin, hydrocortisone, hydrocortisone-aloe, ibuprofen, meclizine, polyethylene glycol, and triamcinolone.     Allergies:    -- No Known Drug Allergy -- Unknown    PHYSICAL EXAMINATION:  Resp 16   LMP 05/01/2021 (Within Weeks)    --CONSTITUTIONAL: Vital signs as above. No acute distress. The patient is well nourished and well groomed.  --PSYCHIATRIC: The patient is awake, alert, oriented to person, place, time and answering questions appropriately with clear speech. Appropriate mood and affect   --SKIN:  Posterior torso is clean, dry, intact without rashes.   --RESPIRATORY: Normal rhythm and effort. No abnormal accessory muscle breathing patterns noted.   --GROSS MOTOR: Easily arises from a seated position. Toe walking and heel walking are normal.    --STANDING EXAMINATION: Gait is non-antalgic. Normal lumbar lordosis noted, no lateral shift.  --LOWER EXTREMITY MOTOR TESTING:  Plantar flexion left 5/5, right 5/5   Dorsiflexion left 5/5, right 5/5   Great toe MTP extension left 5/5, right 5/5  Knee flexion left 5/5, right 5/5  Knee extension left 5/5, right 5/5   Hip flexion left 5/5, right 5/5   ---ABDOMEN: abdomen is soft, round, and symmetric. She has right lower quadrant pain with palpation. No mass..  --MUSCULOSKELETAL: Lumbar spine inspection reveals no evidence of deformity. Range of motion is not limited in lumbar flexion, extension, lateral rotation. Straight leg raising in the supine position is negative to radicular pain. Sciatic notch non-tender. Facet loading maneuvers are  positive.  Endorses pain diffusely with palpation of the lumbar spine and gluteus bilaterally.  --SACROILIAC JOINT: No tenderness to palpation.  Positive Dami's with reproduction of pain to affected extremity.   --HIPS: slightly limited range of motion bilaterally. Range of motion elicits back pain and right hip pain. Negative FABERs on the involved lower extremity.    --NEUROLOGIC: 2/4 patellar and achilles reflexes bilaterally.  Sensation to light touch is intact in the bilateral L4, L5, and S1 dermatomes except for bilateral feet, medial lower leg. No clonus.    --VASCULAR:  Warm lower limbs bilaterally. There is no pitting edema of the bilateral lower extremities.       ASSESSMENT:  Law Cruz is a pleasant 58 year old female who presents with chronic, longstanding bilateral low back pain with radiculopathy R>L with complaints of abdominal pain.    MRI of the lumbar spine from 6/21/2016 shows mild to moderate spinal canal stenosis with effacement of the lateral recesses bilaterally at L4-L5 with some probable impingement of the bilateral traversing L5 nerve roots in the lateral recesses of L4-L5.  Effacement of the lateral recesses at L5-S1 L>R, L4-L5 neuroforaminal stenosis, L4-L5 and L5-S1 moderate bilateral facet arthropathy    PLAN:  -MRI of the lumbar spine was ordered by her PCP, scheduled for 5/19.  She was not aware of the plan for imaging and so we reviewed that appointment today.  -Add XR of the pelvis/hips as exam maneuvers also elicited right anterior hip pain  -she reports chronic longstanding abdominal pain that started after a miscarriage 10 years ago. Could consider thoracic spine MRI to assess any nerve related component to her abdominal pain however she seems to be describing a more localized abdominal pain in a nondermatomal pattern and so will defer that at this time.  I encouraged her to follow up with her PCP regarding her chronic abdominal pain.   -Refer for physical therapy  -Currently has  prescription for gabapentin 100 mg 2 times daily.  We will defer any changes to this medication as she does have some lightheadedness with this medication  - RTC for review of MRI and XR    Ready to learn, no apparent learning barriers.  Education provided on treatment plan according to patient's preferred learning style.  Patient verbalizes understanding.   __________________________________  Carol Ann Rios NP  Physical Medicine & Rehabilitation        60 minutes spent by me on the date of the encounter doing chart review, history and exam, documentation and further activities per the note

## 2023-05-16 ENCOUNTER — OFFICE VISIT (OUTPATIENT)
Dept: PHYSICAL MEDICINE AND REHAB | Facility: CLINIC | Age: 58
End: 2023-05-16
Payer: COMMERCIAL

## 2023-05-16 ENCOUNTER — ANCILLARY PROCEDURE (OUTPATIENT)
Dept: GENERAL RADIOLOGY | Facility: CLINIC | Age: 58
End: 2023-05-16
Attending: NURSE PRACTITIONER
Payer: COMMERCIAL

## 2023-05-16 VITALS — RESPIRATION RATE: 16 BRPM

## 2023-05-16 DIAGNOSIS — R52 PAIN: ICD-10-CM

## 2023-05-16 DIAGNOSIS — M54.16 LUMBAR RADICULOPATHY: ICD-10-CM

## 2023-05-16 DIAGNOSIS — M25.551 HIP PAIN, RIGHT: Primary | ICD-10-CM

## 2023-05-16 DIAGNOSIS — R10.31 ABDOMINAL PAIN, RIGHT LOWER QUADRANT: ICD-10-CM

## 2023-05-16 DIAGNOSIS — M54.42 CHRONIC BILATERAL LOW BACK PAIN WITH BILATERAL SCIATICA: ICD-10-CM

## 2023-05-16 DIAGNOSIS — M25.551 HIP PAIN, RIGHT: ICD-10-CM

## 2023-05-16 DIAGNOSIS — M54.41 CHRONIC BILATERAL LOW BACK PAIN WITH BILATERAL SCIATICA: ICD-10-CM

## 2023-05-16 DIAGNOSIS — G89.29 CHRONIC BILATERAL LOW BACK PAIN WITH BILATERAL SCIATICA: ICD-10-CM

## 2023-05-16 PROCEDURE — 73522 X-RAY EXAM HIPS BI 3-4 VIEWS: CPT | Performed by: RADIOLOGY

## 2023-05-16 PROCEDURE — 99205 OFFICE O/P NEW HI 60 MIN: CPT | Performed by: NURSE PRACTITIONER

## 2023-05-16 NOTE — LETTER
5/16/2023       RE: Law Cruz  [address]  [place] [address]       Dear Colleague,    Thank you for referring your patient, Law Cruz, to the [facility] PHYSICAL MEDICINE AND REHABILITATION CLINIC [place] [facility]. Please see a copy of my visit note below.    Patient seen at the request of Dr Honeycutt in family medicine for an opinion and evaluation of Chronic bilateral low back pain with bilateral sciatica.      HISTORY OF PRESENT ILLNESS:  Law Cruz is a 58 year old female who presents with a chief complaint of low back pain with sciatica.  Visit completed with  assistance.  Her son was present as well.    She reports a history of chronic pain that started about 10 years ago.  The pain affects her low back R>L. She says sometimes the back pain radiates to the right groin. At times the pain radiates down the side and back of her legs into the feet (R>L).      She also reports a longstanding history of abdominal pain that she says started after her having a miscarriage 10 years ago. She says when the abdominal pain is bad, it exacerbates the leg pain. She says the abdominal discomfort is what bothers her the most today.  She describes the abdominal pain as constant and mostly mild for the last 10 years.  However when she does heavy lifting there is some increased abdominal pain.  She reports occasional constipation but her last bowel movement was this morning. She denies nausea or vomiting.    The pain is worse with heavy lifting, turning, or with riding in a car.  When she sitting still she does not have much pain.  She uses paracetamol and heat for the pain.  She does not use much gabapentin because it causes her to feel lightheaded.    She denies falls, weakness, bowel incontinence, saddle anesthesia, unintentional weight loss, fevers/chills, or drenching night sweats. She endorses some leaking urine if she is very full after eating and  sometimes says she can't always tell if she needs to urinate, but says that has been the case x10 years.     PRIOR INJURIES/TREATMENT:   Ice/Heat: heat  Brace: none  Physical Therapy:   Did PT 1-2 years ago for low back pain     - Current Pain Medications -   Paracetamol  Gabapentin - infrequently due to lightheadedness    - Prior/Trialed Pain Medications -   ibuprofen    Prior Procedures:  Date    Procedure   Improvement (%)  none              Prior Related Surgery: none   Other (acupuncture, OMT, CMM, TENS, DME, etc.): none      Specialists Seen - (with most recent, available notes and clinic visits reviewed)   1. none    IMAGING - reviewed   MR LUMBAR SPINE WO CONTRAST  6/21/2016   FINDINGS: Nomenclature is based on 5 lumbar type vertebral bodies. The conus ends at L1. Mild levocurvature of the lumbar spine. Vertebral body heights are maintained. Nonpathologic marrow signal. The sacroiliac joints are grossly within normal limits.   No MRI evidence for pars defects. The paraspinal soft tissues are grossly within normal limits. The distal abdominal aorta has normal diameter. The psoas musculature are within normal limits. Borderline congenitally narrow spinal canal.     T12-L1: Normal intervertebral disc height and signal. No disc herniation. No facet arthropathy. No superimposed spinal canal narrowing. No neuroforaminal narrowing.     L1-L2: Normal intervertebral disc height and signal. Mild posterior annular bulge. No facet arthropathy. No superimposed spinal canal narrowing. No neuroforaminal narrowing.     L2-L3: Mild intervertebral disc height loss. Loss of the normal T2 signal within the disc. Broad-based disc bulge. Mild bilateral facet arthropathy. Mild narrowing of the ventral spinal canal. Encroachment of the inferior neuroforamina without   significant neuroforaminal narrowing.     L3-L4: Normal intervertebral disc height and mild loss of the normal T2 signal within the disc. Broad-based disc bulge. Mild  bilateral facet arthropathy. No superimposed spinal canal narrowing. No neuroforaminal narrowing.     L4-L5: Normal intervertebral disc height. Loss of the normal T2 signal within the disc. Broad-based disc bulge with superimposed small central disc fusion and annular fissure. Moderate bilateral facet arthropathy. Mild to moderate spinal canal   Narrowing.  Narrowing of the lateral recesses bilaterally. Probable impingement of the bilateral traversing L5 nerve roots in the lateral recesses. Mild right neuroforaminal narrowing. Mild to moderate left neuroforaminal narrowing.     L5-S1: Normal intervertebral disc height. Loss of the normal T2 signal within the disc. Broad-based disc bulge with superimposed left central/left paracentral disc protrusion. Moderate bilateral facet arthropathy. Effacement of the lateral recesses,   left greater than right. No right neuroforaminal narrowing. Mild left neuroforaminal narrowing.      IMPRESSION:  CONCLUSION:  1.  Mild to moderate spinal canal narrowing with effacement of the lateral recesses bilaterally at L4-L5. Probable impingement of the bilateral traversing L5 nerve roots in the lateral recesses of L4-L5.   2.  Effacement of the lateral recesses at L5-S1, left greater than right.  3.  Mild to moderate left and mild right neuroforaminal narrowing at L4-L5.  4.  Moderate bilateral facet arthropathy at L4-L5 and L5-S1.     KEY IMAGE(S):  Sagittal T2-weighted image(s): 9 and 12.  Axial whole-spine T2-weighted image(s): 31.    US PELVIS COMPLETE W TRANSVAGINAL AND DOPPLER LIMITED: 10/18/2022   IMPRESSION:    1.  Normal pelvic ultrasound.      Review Of Systems:  I am responding to those symptoms which are directly relevant to the specific indication for my consultation. I recommend that the patient follow up with their primary or referring provider to pursue any other symptoms which may be of concern.       Medical History:  She  has a past medical history of Calcified  granuloma of lung (H) (9/14/2019), Chest pain, Colitis, Depression, H. 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     She  has no past surgical history on file.    Family History  Her family history is not on file.     Social History:  Work: She states that she has not worked since arriving in [place] and that she is not able to perform any strenuous activity or ride in a vehicle without pain.  She  reports that she has never smoked. Her smokeless tobacco use includes chew. She reports that she does not drink alcohol and does not use drugs.        Current Medications:   She has a current medication list which includes the following prescription(s): acetaminophen, acetaminophen, gabapentin, hydrocortisone, hydrocortisone-aloe, ibuprofen, meclizine, polyethylene glycol, and triamcinolone.     Allergies:    -- No Known Drug Allergy -- Unknown    PHYSICAL EXAMINATION:  Resp 16   LMP 05/01/2021 (Within Weeks)    --CONSTITUTIONAL: Vital signs as above. No acute distress. The patient is well nourished and well groomed.  --PSYCHIATRIC: The patient is awake, alert, oriented to person, place, time and answering questions appropriately with clear speech. Appropriate mood and affect   --SKIN:  Posterior torso is clean, dry, intact without rashes.   --RESPIRATORY: Normal rhythm and effort. No abnormal accessory muscle breathing patterns noted.   --GROSS MOTOR: Easily arises from a seated position. Toe walking and heel walking are normal.    --STANDING EXAMINATION: Gait is non-antalgic. Normal lumbar lordosis noted, no lateral shift.  --LOWER EXTREMITY MOTOR TESTING:  Plantar flexion left 5/5, right 5/5   Dorsiflexion left 5/5, right 5/5   Great toe MTP extension left 5/5, right 5/5  Knee flexion left 5/5, right 5/5  Knee extension left 5/5, right 5/5   Hip flexion left 5/5, right 5/5   ---ABDOMEN: abdomen is soft, round, and symmetric. She has right  lower quadrant pain with palpation. No mass..  --MUSCULOSKELETAL: Lumbar spine inspection reveals no evidence of deformity. Range of motion is not limited in lumbar flexion, extension, lateral rotation. Straight leg raising in the supine position is negative to radicular pain. Sciatic notch non-tender. Facet loading maneuvers are positive.  Endorses pain diffusely with palpation of the lumbar spine and gluteus bilaterally.  --SACROILIAC JOINT: No tenderness to palpation.  Positive Dami's with reproduction of pain to affected extremity.   --HIPS: slightly limited range of motion bilaterally. Range of motion elicits back pain and right hip pain. Negative FABERs on the involved lower extremity.    --NEUROLOGIC: 2/4 patellar and achilles reflexes bilaterally.  Sensation to light touch is intact in the bilateral L4, L5, and S1 dermatomes except for bilateral feet, medial lower leg. No clonus.    --VASCULAR:  Warm lower limbs bilaterally. There is no pitting edema of the bilateral lower extremities.       ASSESSMENT:  Law Cruz is a pleasant 58 year old female who presents with chronic, longstanding bilateral low back pain with radiculopathy R>L with complaints of abdominal pain.    MRI of the lumbar spine from 6/21/2016 shows mild to moderate spinal canal stenosis with effacement of the lateral recesses bilaterally at L4-L5 with some probable impingement of the bilateral traversing L5 nerve roots in the lateral recesses of L4-L5.  Effacement of the lateral recesses at L5-S1 L>R, L4-L5 neuroforaminal stenosis, L4-L5 and L5-S1 moderate bilateral facet arthropathy    PLAN:  -MRI of the lumbar spine was ordered by her PCP, scheduled for 5/19.  She was not aware of the plan for imaging and so we reviewed that appointment today.  -Add XR of the pelvis/hips as exam maneuvers also elicited right anterior hip pain  -she reports chronic longstanding abdominal pain that started after a miscarriage 10 years ago. Could consider  thoracic spine MRI to assess any nerve related component to her abdominal pain however she seems to be describing a more localized abdominal pain in a nondermatomal pattern and so will defer that at this time.  I encouraged her to follow up with her PCP regarding her chronic abdominal pain.   -Refer for physical therapy  -Currently has prescription for gabapentin 100 mg 2 times daily.  We will defer any changes to this medication as she does have some lightheadedness with this medication  - RTC for review of MRI and XR    Ready to learn, no apparent learning barriers.  Education provided on treatment plan according to patient's preferred learning style.  Patient verbalizes understanding.   __________________________________    60 minutes spent by me on the date of the encounter doing chart review, history and exam, documentation and further activities per the note         Again, thank you for allowing me to participate in the care of your patient.      Sincerely,    LELA Stern CNP

## 2023-05-16 NOTE — PATIENT INSTRUCTIONS
It was a pleasure seeing you in the clinic today.  As discussed, we made the following updates to your plan of care:    An XR order of the hips/pelvis was added today which you can do after the appointment today.        Your primary doctor entered an order for MRI of the lumbar spine. The appointment is Friday May 19 at 9:15. Please call radiology for more information about that appointment.    Imaging scheduling  [facility] [phone] Clinics and Surgery Center [facility] ([facility] and [facility]), [facility], including [facility], [facility] [phone] [facility], [facility] Clinics including [place], [place], [place], [place], and [facility] [phone] [facility], [facility], [facility], [facility] clinics, including [place], [facility], and [facility] [phone] [facility], [facility], [facility] Clinics, including [place], [facility], and [employer]     An order for physical therapy was added today. Physical therapy schedulers should call you in the next few days to schedule an appointment. You may also call [phone] to arrange an appointment at any of the [facility] outpatient physical therapy locations.  It will be very important for you to do the physical therapy exercises on a regular basis to decrease your pain and prevent future flares of pain.           Thank you,  Carol Ann Rios NP  Physical Medicine and Rehabilitation, Medical Spine  PM&R clinic Phone: [phone]  PM&R clinic Fax: [phone]

## 2023-05-19 ENCOUNTER — HOSPITAL ENCOUNTER (OUTPATIENT)
Dept: MRI IMAGING | Facility: HOSPITAL | Age: 58
Discharge: HOME | End: 2023-05-19
Attending: FAMILY MEDICINE | Admitting: FAMILY MEDICINE
Payer: COMMERCIAL

## 2023-05-19 DIAGNOSIS — M54.41 CHRONIC BILATERAL LOW BACK PAIN WITH BILATERAL SCIATICA: ICD-10-CM

## 2023-05-19 DIAGNOSIS — G89.29 CHRONIC BILATERAL LOW BACK PAIN WITH BILATERAL SCIATICA: ICD-10-CM

## 2023-05-19 DIAGNOSIS — M51.26 LUMBAR DISC HERNIATION: ICD-10-CM

## 2023-05-19 DIAGNOSIS — M54.42 CHRONIC BILATERAL LOW BACK PAIN WITH BILATERAL SCIATICA: ICD-10-CM

## 2023-05-19 PROCEDURE — 72148 MRI LUMBAR SPINE W/O DYE: CPT

## 2023-05-22 ENCOUNTER — TELEPHONE (OUTPATIENT)
Dept: PHYSICAL THERAPY | Facility: REHABILITATION | Age: 58
End: 2023-05-22

## 2023-05-22 ENCOUNTER — PATIENT OUTREACH (OUTPATIENT)
Dept: CARE COORDINATION | Facility: CLINIC | Age: 58
End: 2023-05-22

## 2023-05-22 NOTE — PROGRESS NOTES
Clinic Care Coordination Contact  Program:  County:  [facility] Case #:  [facility] Worker:   Mnsure #:   Subscriber #:   Renewal:  Date Applied:     FRW Outreach:   23  FRW called patient attempt x2 and left a vm with call back information. FRW will make outreach next week  23  FRW called patient and left a vm with call back information. FRW will make outreach next week  23 FRW called Patient and we went over screening questions. Patient was not sure what the house hold income was at this time and so she is going to work on getting that information for the FRW and we set up a time to complete screening and maybe apply for benefits if needed.    SNAP/CASH Application Screenin. Have you had [facility] benefits before? Yes   2. How many people in the household, do you eat/buy food together? 5  3. What is your monthly income (include all tax members)?   4. Do you have a bank account?   5. Do you have any utility bills (electricity, rent, mortgage, phone, insurance, medical bills, etc.)?   6. Do you have social security cards and/or green cards?       Health Insurance:      Referral/Screening:  FRW Screening    Row Name [phone]       [facility] Benefits   Is patient requesting help applying for [facility] benefits? Yes       Have you recently applied for any [facility] benefits? No       What was applied for?  SNAP;CASH       How many people in your household? 5       Do you buy/eat food together? Yes       What is the monthly gross income for the household (wages, social security, workers comp, and pension)?  7500       Insurance:   [place]? No       Is this an insurance renewal? No       Is this a new insurance application request? No       OTHER   Is this a selena care application? No       Any other information for the FRW? Patient needs Jose D/Yany

## 2023-06-01 NOTE — PROGRESS NOTES
PM&R Follow-Up Visit -     Date of Initial Visit: 5/16/2023  LOV: 5/16/2023  TD: 6/2/2023     Recall: Law Cruz is a 58 year old female who presents with a chief complaint of low back pain with sciatica.      INTERVAL HISTORY:  Patient was last seen in clinic 5/16/2023. At that visit, the plan was to complete the MRI of the lumbar spine which had been ordered by her PCP, obtain an x-ray of the pelvis and hips, and begin physical therapy.     She was seen today with  assistance, to review the imaging findings.     She says she has no back pain today but does endorse numbness in both legs (R>L) extending to the toes. The numbness runs along the side of the leg into the whole foot and toes (R>L). The numbness has been worsening for awhile though she is unable to say for how long. She does endorse some occasional weakness L>R.     She endorses one fall when she first moved to [place], 10-11 years ago.  She was unsure if the pain and numbness started after the fall or not.  She denies any recent falls.  She endorses baseline urinary incontinence for a long time.  She denies bowel incontinence or saddle anesthesia.    She was not able to start physical therapy. She relies on her son to take her to appointments but he is not always available, so she says it is difficult for her to get to appointments. She missed the therapy appt 5/23.    She was not able to review her medications with me so she was unable to comment on efficacy or side effects of the gabapentin.  She says that she manages her own medications at home.        HISTORY OF PRESENT ILLNESS:  Law Cruz is a 58 year old female who presents with a chief complaint of low back pain with sciatica.  Visit completed with  assistance.  Her son was present as well.    She reports a history of chronic pain that started about 10 years ago.  The pain affects her low back R>L. She says sometimes the back pain radiates to the right groin. At times the pain  radiates down the side and back of her legs into the feet (R>L).      She also reports a longstanding history of abdominal pain that she says started after her having a miscarriage 10 years ago. She says when the abdominal pain is bad, it exacerbates the leg pain. She says the abdominal discomfort is what bothers her the most today.  She describes the abdominal pain as constant and mostly mild for the last 10 years.  However when she does heavy lifting there is some increased abdominal pain.  She reports occasional constipation but her last bowel movement was this morning. She denies nausea or vomiting.    The pain is worse with heavy lifting, turning, or with riding in a car.  When she sitting still she does not have much pain.  She uses paracetamol and heat for the pain. She does not use much gabapentin because it causes her to feel lightheaded.    She denies falls, weakness, bowel incontinence, saddle anesthesia, unintentional weight loss, fevers/chills, or drenching night sweats. She endorses some leaking urine if she is very full after eating and sometimes says she can't always tell if she needs to urinate, but says that has been the case x10 years.     PRIOR INJURIES/TREATMENT:   Ice/Heat: heat    Brace: none    Physical Therapy:   No-show for PT appointment 5/22/2023  Did PT 1-2 years ago for low back pain     - Current Pain Medications - per prior visit - she was not able to confirm medications today  Paracetamol  Gabapentin -100 mg twice daily infrequently due to lightheadedness  Tylenol    - Prior/Trialed Pain Medications -   ibuprofen    Prior Procedures:  Date    Procedure   Improvement (%)  none              Prior Related Surgery: none   Other (acupuncture, OMT, CMM, TENS, DME, etc.): none      Specialists Seen - (with most recent, available notes and clinic visits reviewed)   1. none    IMAGING - reviewed     2 views pelvis and bilateral hip radiograph(s) 5/16/2023   Findings:  AP view(s) of the  pelvis, frog-leg lateral views of each hip were  obtained.      No acute osseous abnormality.  No substantial degenerative change of hips.  Sacrum and innominate bones are partially obscured by overlying bowel  gas/fecal content.                                                        Impression:  1. No acute osseous abnormality.  2. No substantial degenerative change.      MR LUMBAR SPINE WITHOUT CONTRAST 05/19/2023  FINDINGS:   Nomenclature is based on five lumbar-type vertebral bodies. Satisfactory lumbar vertebral body height, with 1 mm degenerative anterior subluxation L4-L5. Mild disc desiccation mid and lower lumbar levels as before. Satisfactory sacral alignment. Nothing for sacral insufficiency or stress fracture. No presacral mass or fluid collections. Leftward lumbar curve. Mild mid and lower lumbar spine facet joint degenerative arthropathy involving the facet joints with degenerative changes both SI joints. Sacral neural foramina appear intact. Normal distal spinal cord and cauda equina with conus medullaris at L1. Nerve roots of the cauda equina are satisfactory without nodularity or thickening. No retroperitoneal solid mass or adenopathy. Normal caliber abdominal aorta. Symmetric psoas appearance bilaterally. Satisfactory renal contours. No pelvic mass or adenopathy. Benign vertebral body hemangioma L3. Nothing for a marrow infiltrative process.     T12-L1: Normal disc height and signal. No herniation. Normal facets. No spinal canal or neural foraminal stenosis.      L1-L2: Normal disc height and signal. No herniation. Normal facets. No spinal canal or neural foraminal stenosis.     L2-L3: Normal disc height with disc desiccation and annular bulge. No herniation. Normal facets. Mild spinal stenosis. No foraminal narrowing. New from prior study is a focal annular tear/disruption in the central zone series 2 image 9.      L3-L4: Satisfactory disc height with annular bulge asymmetric to the left as  before. Mild spinal stenosis. Mild foraminal narrowing, left more than right, unchanged. Mild encroachment left L4 subarticular zone with mild to moderate facet joint   arthropathy, stable from previous     L4-L5: Satisfactory disc height with generalized disc bulge. Shallow broad-based central disc protrusion with moderate to severe canal stenosis and moderate bilateral foraminal narrowing. The degree of spinal stenosis has slightly progressed from prior study. Annular tear/disruption focally in the central zone as before with mild to moderate facet joint arthropathy.     L5-S1: Satisfactory disc height. Annular bulge. Shallow broad-based central and left disc protrusion as before without spinal stenosis. Mild foraminal narrowing on the left. Annular tear/disruption left foraminal zone is new, and is in proximity to the exited left L5 nerve root, correlate for left L5 radicular presentation. Slight contact of the traversing left S1 nerve root as before could correlate to left S1 radicular presentation series 5 image 10. Mild facet joint arthropathy bilaterally.                                                                      IMPRESSION:  1.  Slight progression of degenerative changes overall in the lumbar spine from previous MR 06/30/2018.     2.  Changes are most significant at lower lumbar levels, slight progression of spinal stenosis at L4-L5 now estimated moderate to severe.     3.  Annular tear/disruption focally in the left foraminal zone is now present at left L5-S1 level, abuts the exited left L5 nerve root with mild left foraminal stenosis. Proximity to annular tear can also represent possible etiology for radiculopathy left L5 nerve root. Stable possible etiology for left S1 radiculopathy is discussed at this level due to contact of traversing left S1 nerve root.     4.  See above for details and description.      MR LUMBAR SPINE WO CONTRAST  6/21/2016   FINDINGS: Nomenclature is based on 5 lumbar  type vertebral bodies. The conus ends at L1. Mild levocurvature of the lumbar spine. Vertebral body heights are maintained. Nonpathologic marrow signal. The sacroiliac joints are grossly within normal limits.   No MRI evidence for pars defects. The paraspinal soft tissues are grossly within normal limits. The distal abdominal aorta has normal diameter. The psoas musculature are within normal limits. Borderline congenitally narrow spinal canal.     T12-L1: Normal intervertebral disc height and signal. No disc herniation. No facet arthropathy. No superimposed spinal canal narrowing. No neuroforaminal narrowing.     L1-L2: Normal intervertebral disc height and signal. Mild posterior annular bulge. No facet arthropathy. No superimposed spinal canal narrowing. No neuroforaminal narrowing.     L2-L3: Mild intervertebral disc height loss. Loss of the normal T2 signal within the disc. Broad-based disc bulge. Mild bilateral facet arthropathy. Mild narrowing of the ventral spinal canal. Encroachment of the inferior neuroforamina without   significant neuroforaminal narrowing.     L3-L4: Normal intervertebral disc height and mild loss of the normal T2 signal within the disc. Broad-based disc bulge. Mild bilateral facet arthropathy. No superimposed spinal canal narrowing. No neuroforaminal narrowing.     L4-L5: Normal intervertebral disc height. Loss of the normal T2 signal within the disc. Broad-based disc bulge with superimposed small central disc fusion and annular fissure. Moderate bilateral facet arthropathy. Mild to moderate spinal canal   Narrowing.  Narrowing of the lateral recesses bilaterally. Probable impingement of the bilateral traversing L5 nerve roots in the lateral recesses. Mild right neuroforaminal narrowing. Mild to moderate left neuroforaminal narrowing.     L5-S1: Normal intervertebral disc height. Loss of the normal T2 signal within the disc. Broad-based disc bulge with superimposed left central/left  paracentral disc protrusion. Moderate bilateral facet arthropathy. Effacement of the lateral recesses,   left greater than right. No right neuroforaminal narrowing. Mild left neuroforaminal narrowing.      IMPRESSION:  CONCLUSION:  1.  Mild to moderate spinal canal narrowing with effacement of the lateral recesses bilaterally at L4-L5. Probable impingement of the bilateral traversing L5 nerve roots in the lateral recesses of L4-L5.   2.  Effacement of the lateral recesses at L5-S1, left greater than right.  3.  Mild to moderate left and mild right neuroforaminal narrowing at L4-L5.  4.  Moderate bilateral facet arthropathy at L4-L5 and L5-S1.     KEY IMAGE(S):  Sagittal T2-weighted image(s): 9 and 12.  Axial whole-spine T2-weighted image(s): 31.    US PELVIS COMPLETE W TRANSVAGINAL AND DOPPLER LIMITED: 10/18/2022   IMPRESSION:    1.  Normal pelvic ultrasound.      Review Of Systems:  I am responding to those symptoms which are directly relevant to the specific indication for my consultation. I recommend that the patient follow up with their primary or referring provider to pursue any other symptoms which may be of concern.       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     She  has no past surgical history on file.    Family History  Her family history is not on file.     Social History:  Work: She states that she has not worked since arriving in [place] and that she is not able to perform any strenuous activity or ride in a vehicle without pain.  She  reports that she has never smoked. Her smokeless tobacco use includes chew. She reports that she does not drink alcohol and does not use drugs.        Current Medications:   She has a current medication list which includes the following prescription(s):  acetaminophen, acetaminophen, gabapentin, hydrocortisone, hydrocortisone-aloe, ibuprofen, meclizine, polyethylene glycol, and triamcinolone.     Allergies:    -- No Known Drug Allergy -- Unknown    PHYSICAL EXAMINATION:  /84   Pulse 69   LMP 05/01/2021 (Within Weeks)   SpO2 98%    --CONSTITUTIONAL: Vital signs as above. No acute distress. The patient is well nourished and well groomed.  --PSYCHIATRIC: The patient is awake, alert, oriented to person, place, time and answering questions appropriately with clear speech. Appropriate mood and affect   --SKIN:  Posterior torso is clean, dry, intact without rashes.   --RESPIRATORY: Normal rhythm and effort. No abnormal accessory muscle breathing patterns noted.   --GROSS MOTOR: Easily arises from a seated position. Toe walking and heel walking are normal.  Walks heel-to-toe in a straight line without difficulty.  --STANDING EXAMINATION: Gait is non-antalgic. Normal lumbar lordosis noted, no lateral shift.  --LOWER EXTREMITY MOTOR TESTING:  Plantar flexion left 5/5, right 5/5   Dorsiflexion left 5/5, right 5/5   Great toe MTP extension left 5/5, right 5/5  Knee flexion left 5/5, right 5/5  Knee extension left 5/5, right 5/5   Hip flexion left 5/5, right 5/5   --MUSCULOSKELETAL: Lumbar spine inspection reveals no evidence of deformity. Range of motion is not limited in lumbar flexion, extension, or lateral rotation.  Lumbar flexion and extension elicit back and leg pain.  Seated slump test positive on the left side and negative on the right side. Sciatic notch non-tender. Facet loading maneuvers are negative bilaterally.    --NEUROLOGIC: 2/4 patellar and achilles reflexes bilaterally.  Decreased sensation of bilateral feet and lower leg R>L. No clonus.    --VASCULAR:  Warm lower limbs bilaterally. There is no pitting edema of the bilateral lower extremities.       ASSESSMENT:  Law Cruz is a pleasant 58 year old female who presents with chronic, longstanding bilateral  low back pain with R>L upper leg pain with lower leg & foot numbness.    #Chronic bilateral low back pain with radiculopathy R>L  #Lower extremity and bilateral foot numbness R>L  #Lumbar neuroforaminal stenosis  #Spinal canal stenosis, moderate to severe at L4-L5  #Lumbar spondylosis    X-ray of the pelvis and hips 5/16/2023 shows no substantial degenerative change of the hips and no fracture or dislocation    MRI of the lumbar spine 5/19/2023 shows a slight progression of the degenerative changes seen on the previous MRI in 2018, which is most notable at the lower lumbar area.  Spinal stenosis at L4-L5 is moderate to severe.  There is annular tear/disruption focally in the left foraminal zone at the left L5-S1 level, abutting the exiting left L5 nerve root with associated mild left foraminal stenosis,  possible etiology for left S1 radiculopathy well due to contacting the left S1 nerve root.  There is mild right neural foraminal stenosis at the L5-S1 level as well.    PLAN:  -MRI of the lumbar spine was reviewed in detail with patient  -She was unable to make it to her recent physical therapy appointment 5/22/2023 and has difficulty making it to appointments due to ongoing transportation issues, so we will update her physical therapy referral for home physical therapy.  -We reviewed procedures to help with the pain and numbness she was experiencing.  I would suggest an L5-S1 interlaminar epidural steroid injection, however she prefers to avoid any interventions at this point.  -We were unable to thoroughly review her medications as she was unable to recall the names of what she takes.  I asked her bring her medications to the next visit so we can review them in person.  We could consider adjusting the gabapentin dose and/or frequency, but I would like to clarify with her first regarding what she is actually taking and any side effects she may be having. (She reported some lightheadedness with gabapentin in the  past.)  -RTC 6 weeks after PT and home exercise program with medications    Ready to learn, no apparent learning barriers.  Education provided on treatment plan according to patient's preferred learning style.  Patient verbalizes understanding.   __________________________________  Carol Ann Rios NP  Physical Medicine & Rehabilitation        60 minutes spent by me on the date of the encounter doing chart review, history and exam, documentation and further activities per the note

## 2023-06-02 ENCOUNTER — OFFICE VISIT (OUTPATIENT)
Dept: PHYSICAL MEDICINE AND REHAB | Facility: CLINIC | Age: 58
End: 2023-06-02
Payer: COMMERCIAL

## 2023-06-02 VITALS — HEART RATE: 69 BPM | DIASTOLIC BLOOD PRESSURE: 84 MMHG | OXYGEN SATURATION: 98 % | SYSTOLIC BLOOD PRESSURE: 126 MMHG

## 2023-06-02 DIAGNOSIS — M54.42 CHRONIC BILATERAL LOW BACK PAIN WITH BILATERAL SCIATICA: Primary | ICD-10-CM

## 2023-06-02 DIAGNOSIS — G89.29 CHRONIC BILATERAL LOW BACK PAIN WITH BILATERAL SCIATICA: Primary | ICD-10-CM

## 2023-06-02 DIAGNOSIS — R20.0 NUMBNESS: ICD-10-CM

## 2023-06-02 DIAGNOSIS — M54.16 LUMBAR RADICULOPATHY: ICD-10-CM

## 2023-06-02 DIAGNOSIS — M54.41 CHRONIC BILATERAL LOW BACK PAIN WITH BILATERAL SCIATICA: Primary | ICD-10-CM

## 2023-06-02 PROCEDURE — 99215 OFFICE O/P EST HI 40 MIN: CPT | Performed by: NURSE PRACTITIONER

## 2023-06-02 NOTE — LETTER
6/2/2023       RE: Law Cruz  [address]  [place] [address]     Dear Colleague,    Thank you for referring your patient, Law Cruz, to the [facility] PHYSICAL MEDICINE AND REHABILITATION CLINIC [place] at [facility]. Please see a copy of my visit note below.    PM&R Follow-Up Visit -     Date of Initial Visit: 5/16/2023  LOV: 5/16/2023  TD: 6/2/2023     Recall: Law Cruz is a 58 year old female who presents with a chief complaint of low back pain with sciatica.      INTERVAL HISTORY:  Patient was last seen in clinic 5/16/2023. At that visit, the plan was to complete the MRI of the lumbar spine which had been ordered by her PCP, obtain an x-ray of the pelvis and hips, and begin physical therapy.     She was seen today with  assistance, to review the imaging findings.     She says she has no back pain today but does endorse numbness in both legs (R>L) extending to the toes. The numbness runs along the side of the leg into the whole foot and toes (R>L). The numbness has been worsening for awhile though she is unable to say for how long. She does endorse some occasional weakness L>R.     She endorses one fall when she first moved to [place], 10-11 years ago.  She was unsure if the pain and numbness started after the fall or not.  She denies any recent falls.  She endorses baseline urinary incontinence for a long time.  She denies bowel incontinence or saddle anesthesia.    She was not able to start physical therapy. She relies on her son to take her to appointments but he is not always available, so she says it is difficult for her to get to appointments. She missed the therapy appt 5/23.    She was not able to review her medications with me so she was unable to comment on efficacy or side effects of the gabapentin.  She says that she manages her own medications at home.        HISTORY OF PRESENT ILLNESS:  Law Cruz is a 58 year old female who  presents with a chief complaint of low back pain with sciatica.  Visit completed with  assistance.  Her son was present as well.    She reports a history of chronic pain that started about 10 years ago.  The pain affects her low back R>L. She says sometimes the back pain radiates to the right groin. At times the pain radiates down the side and back of her legs into the feet (R>L).      She also reports a longstanding history of abdominal pain that she says started after her having a miscarriage 10 years ago. She says when the abdominal pain is bad, it exacerbates the leg pain. She says the abdominal discomfort is what bothers her the most today.  She describes the abdominal pain as constant and mostly mild for the last 10 years.  However when she does heavy lifting there is some increased abdominal pain.  She reports occasional constipation but her last bowel movement was this morning. She denies nausea or vomiting.    The pain is worse with heavy lifting, turning, or with riding in a car.  When she sitting still she does not have much pain.  She uses paracetamol and heat for the pain. She does not use much gabapentin because it causes her to feel lightheaded.    She denies falls, weakness, bowel incontinence, saddle anesthesia, unintentional weight loss, fevers/chills, or drenching night sweats. She endorses some leaking urine if she is very full after eating and sometimes says she can't always tell if she needs to urinate, but says that has been the case x10 years.     PRIOR INJURIES/TREATMENT:   Ice/Heat: heat    Brace: none    Physical Therapy:   No-show for PT appointment 5/22/2023  Did PT 1-2 years ago for low back pain     - Current Pain Medications - per prior visit - she was not able to confirm medications today  Paracetamol  Gabapentin -100 mg twice daily infrequently due to lightheadedness  Tylenol    - Prior/Trialed Pain Medications -   ibuprofen    Prior Procedures:  Date    Procedure    Improvement (%)  none              Prior Related Surgery: none   Other (acupuncture, OMT, CMM, TENS, DME, etc.): none      Specialists Seen - (with most recent, available notes and clinic visits reviewed)   1. none    IMAGING - reviewed     2 views pelvis and bilateral hip radiograph(s) 5/16/2023   Findings:  AP view(s) of the pelvis, frog-leg lateral views of each hip were  obtained.      No acute osseous abnormality.  No substantial degenerative change of hips.  Sacrum and innominate bones are partially obscured by overlying bowel  gas/fecal content.                                                        Impression:  1. No acute osseous abnormality.  2. No substantial degenerative change.      MR LUMBAR SPINE WITHOUT CONTRAST 05/19/2023  FINDINGS:   Nomenclature is based on five lumbar-type vertebral bodies. Satisfactory lumbar vertebral body height, with 1 mm degenerative anterior subluxation L4-L5. Mild disc desiccation mid and lower lumbar levels as before. Satisfactory sacral alignment. Nothing for sacral insufficiency or stress fracture. No presacral mass or fluid collections. Leftward lumbar curve. Mild mid and lower lumbar spine facet joint degenerative arthropathy involving the facet joints with degenerative changes both SI joints. Sacral neural foramina appear intact. Normal distal spinal cord and cauda equina with conus medullaris at L1. Nerve roots of the cauda equina are satisfactory without nodularity or thickening. No retroperitoneal solid mass or adenopathy. Normal caliber abdominal aorta. Symmetric psoas appearance bilaterally. Satisfactory renal contours. No pelvic mass or adenopathy. Benign vertebral body hemangioma L3. Nothing for a marrow infiltrative process.     T12-L1: Normal disc height and signal. No herniation. Normal facets. No spinal canal or neural foraminal stenosis.      L1-L2: Normal disc height and signal. No herniation. Normal facets. No spinal canal or neural foraminal  stenosis.     L2-L3: Normal disc height with disc desiccation and annular bulge. No herniation. Normal facets. Mild spinal stenosis. No foraminal narrowing. New from prior study is a focal annular tear/disruption in the central zone series 2 image 9.      L3-L4: Satisfactory disc height with annular bulge asymmetric to the left as before. Mild spinal stenosis. Mild foraminal narrowing, left more than right, unchanged. Mild encroachment left L4 subarticular zone with mild to moderate facet joint   arthropathy, stable from previous     L4-L5: Satisfactory disc height with generalized disc bulge. Shallow broad-based central disc protrusion with moderate to severe canal stenosis and moderate bilateral foraminal narrowing. The degree of spinal stenosis has slightly progressed from prior study. Annular tear/disruption focally in the central zone as before with mild to moderate facet joint arthropathy.     L5-S1: Satisfactory disc height. Annular bulge. Shallow broad-based central and left disc protrusion as before without spinal stenosis. Mild foraminal narrowing on the left. Annular tear/disruption left foraminal zone is new, and is in proximity to the exited left L5 nerve root, correlate for left L5 radicular presentation. Slight contact of the traversing left S1 nerve root as before could correlate to left S1 radicular presentation series 5 image 10. Mild facet joint arthropathy bilaterally.                                                                      IMPRESSION:  1.  Slight progression of degenerative changes overall in the lumbar spine from previous MR 06/30/2018.     2.  Changes are most significant at lower lumbar levels, slight progression of spinal stenosis at L4-L5 now estimated moderate to severe.     3.  Annular tear/disruption focally in the left foraminal zone is now present at left L5-S1 level, abuts the exited left L5 nerve root with mild left foraminal stenosis. Proximity to annular tear can also  represent possible etiology for radiculopathy left L5 nerve root. Stable possible etiology for left S1 radiculopathy is discussed at this level due to contact of traversing left S1 nerve root.     4.  See above for details and description.      MR LUMBAR SPINE WO CONTRAST  6/21/2016   FINDINGS: Nomenclature is based on 5 lumbar type vertebral bodies. The conus ends at L1. Mild levocurvature of the lumbar spine. Vertebral body heights are maintained. Nonpathologic marrow signal. The sacroiliac joints are grossly within normal limits.   No MRI evidence for pars defects. The paraspinal soft tissues are grossly within normal limits. The distal abdominal aorta has normal diameter. The psoas musculature are within normal limits. Borderline congenitally narrow spinal canal.     T12-L1: Normal intervertebral disc height and signal. No disc herniation. No facet arthropathy. No superimposed spinal canal narrowing. No neuroforaminal narrowing.     L1-L2: Normal intervertebral disc height and signal. Mild posterior annular bulge. No facet arthropathy. No superimposed spinal canal narrowing. No neuroforaminal narrowing.     L2-L3: Mild intervertebral disc height loss. Loss of the normal T2 signal within the disc. Broad-based disc bulge. Mild bilateral facet arthropathy. Mild narrowing of the ventral spinal canal. Encroachment of the inferior neuroforamina without   significant neuroforaminal narrowing.     L3-L4: Normal intervertebral disc height and mild loss of the normal T2 signal within the disc. Broad-based disc bulge. Mild bilateral facet arthropathy. No superimposed spinal canal narrowing. No neuroforaminal narrowing.     L4-L5: Normal intervertebral disc height. Loss of the normal T2 signal within the disc. Broad-based disc bulge with superimposed small central disc fusion and annular fissure. Moderate bilateral facet arthropathy. Mild to moderate spinal canal   Narrowing.  Narrowing of the lateral recesses bilaterally.  Probable impingement of the bilateral traversing L5 nerve roots in the lateral recesses. Mild right neuroforaminal narrowing. Mild to moderate left neuroforaminal narrowing.     L5-S1: Normal intervertebral disc height. Loss of the normal T2 signal within the disc. Broad-based disc bulge with superimposed left central/left paracentral disc protrusion. Moderate bilateral facet arthropathy. Effacement of the lateral recesses,   left greater than right. No right neuroforaminal narrowing. Mild left neuroforaminal narrowing.      IMPRESSION:  CONCLUSION:  1.  Mild to moderate spinal canal narrowing with effacement of the lateral recesses bilaterally at L4-L5. Probable impingement of the bilateral traversing L5 nerve roots in the lateral recesses of L4-L5.   2.  Effacement of the lateral recesses at L5-S1, left greater than right.  3.  Mild to moderate left and mild right neuroforaminal narrowing at L4-L5.  4.  Moderate bilateral facet arthropathy at L4-L5 and L5-S1.     KEY IMAGE(S):  Sagittal T2-weighted image(s): 9 and 12.  Axial whole-spine T2-weighted image(s): 31.    US PELVIS COMPLETE W TRANSVAGINAL AND DOPPLER LIMITED: 10/18/2022   IMPRESSION:    1.  Normal pelvic ultrasound.      Review Of Systems:  I am responding to those symptoms which are directly relevant to the specific indication for my consultation. I recommend that the patient follow up with their primary or referring provider to pursue any other symptoms which may be of concern.       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     She  has no past surgical history on file.    Family History  Her family history is not on file.     Social History:  Work: She states that she has not worked since arriving in [place] and that she is not able to  perform any strenuous activity or ride in a vehicle without pain.  She  reports that she has never smoked. Her smokeless tobacco use includes chew. She reports that she does not drink alcohol and does not use drugs.        Current Medications:   She has a current medication list which includes the following prescription(s): acetaminophen, acetaminophen, gabapentin, hydrocortisone, hydrocortisone-aloe, ibuprofen, meclizine, polyethylene glycol, and triamcinolone.     Allergies:    -- No Known Drug Allergy -- Unknown    PHYSICAL EXAMINATION:  /84   Pulse 69   LMP 05/01/2021 (Within Weeks)   SpO2 98%    --CONSTITUTIONAL: Vital signs as above. No acute distress. The patient is well nourished and well groomed.  --PSYCHIATRIC: The patient is awake, alert, oriented to person, place, time and answering questions appropriately with clear speech. Appropriate mood and affect   --SKIN:  Posterior torso is clean, dry, intact without rashes.   --RESPIRATORY: Normal rhythm and effort. No abnormal accessory muscle breathing patterns noted.   --GROSS MOTOR: Easily arises from a seated position. Toe walking and heel walking are normal.  Walks heel-to-toe in a straight line without difficulty.  --STANDING EXAMINATION: Gait is non-antalgic. Normal lumbar lordosis noted, no lateral shift.  --LOWER EXTREMITY MOTOR TESTING:  Plantar flexion left 5/5, right 5/5   Dorsiflexion left 5/5, right 5/5   Great toe MTP extension left 5/5, right 5/5  Knee flexion left 5/5, right 5/5  Knee extension left 5/5, right 5/5   Hip flexion left 5/5, right 5/5   --MUSCULOSKELETAL: Lumbar spine inspection reveals no evidence of deformity. Range of motion is not limited in lumbar flexion, extension, or lateral rotation.  Lumbar flexion and extension elicit back and leg pain.  Seated slump test positive on the left side and negative on the right side. Sciatic notch non-tender. Facet loading maneuvers are negative bilaterally.    --NEUROLOGIC: 2/4  patellar and achilles reflexes bilaterally.  Decreased sensation of bilateral feet and lower leg R>L. No clonus.    --VASCULAR:  Warm lower limbs bilaterally. There is no pitting edema of the bilateral lower extremities.       ASSESSMENT:  Law Cruz is a pleasant 58 year old female who presents with chronic, longstanding bilateral low back pain with R>L upper leg pain with lower leg & foot numbness.    #Chronic bilateral low back pain with radiculopathy R>L  #Lower extremity and bilateral foot numbness R>L  #Lumbar neuroforaminal stenosis  #Spinal canal stenosis, moderate to severe at L4-L5  #Lumbar spondylosis    X-ray of the pelvis and hips 5/16/2023 shows no substantial degenerative change of the hips and no fracture or dislocation    MRI of the lumbar spine 5/19/2023 shows a slight progression of the degenerative changes seen on the previous MRI in 2018, which is most notable at the lower lumbar area.  Spinal stenosis at L4-L5 is moderate to severe.  There is annular tear/disruption focally in the left foraminal zone at the left L5-S1 level, abutting the exiting left L5 nerve root with associated mild left foraminal stenosis,  possible etiology for left S1 radiculopathy well due to contacting the left S1 nerve root.  There is mild right neural foraminal stenosis at the L5-S1 level as well.    PLAN:  -MRI of the lumbar spine was reviewed in detail with patient  -She was unable to make it to her recent physical therapy appointment 5/22/2023 and has difficulty making it to appointments due to ongoing transportation issues, so we will update her physical therapy referral for home physical therapy.  -We reviewed procedures to help with the pain and numbness she was experiencing.  I would suggest an L5-S1 interlaminar epidural steroid injection, however she prefers to avoid any interventions at this point.  -We were unable to thoroughly review her medications as she was unable to recall the names of what she takes.  I  asked her bring her medications to the next visit so we can review them in person.  We could consider adjusting the gabapentin dose and/or frequency, but I would like to clarify with her first regarding what she is actually taking and any side effects she may be having. (She reported some lightheadedness with gabapentin in the past.)  -RTC 6 weeks after PT and home exercise program with medications    Ready to learn, no apparent learning barriers.  Education provided on treatment plan according to patient's preferred learning style.  Patient verbalizes understanding.   __________________________________  Carol Ann Rios NP  Physical Medicine & Rehabilitation        60 minutes spent by me on the date of the encounter doing chart review, history and exam, documentation and further activities per the note

## 2023-06-02 NOTE — PATIENT INSTRUCTIONS
It was a pleasure seeing you in the clinic today.  As discussed, we made the following updates to your plan of care:     We will change the order for physical therapy to home physical therapy.     Let's follow up after 6 weeks of physical therapy and home exercise program to see how you are doing and talk about next steps.     Please bring your medicines to the next visit.           Thank you,  Carol Ann Rios NP  Physical Medicine and Rehabilitation, Medical Spine  PM&R clinic Phone: [phone]  PM&R clinic Fax: [phone]

## 2023-06-05 ENCOUNTER — PATIENT OUTREACH (OUTPATIENT)
Dept: CARE COORDINATION | Facility: CLINIC | Age: 58
End: 2023-06-05
Payer: COMMERCIAL

## 2023-06-05 ENCOUNTER — TELEPHONE (OUTPATIENT)
Dept: PHYSICAL MEDICINE AND REHAB | Facility: CLINIC | Age: 58
End: 2023-06-05
Payer: COMMERCIAL

## 2023-06-05 NOTE — PROGRESS NOTES
Clinic Care Coordination Contact  Program: [facility] Benefits   County:  [facility] Case #:  [facility] Worker:   Anne #:   Subscriber #:   Renewal:  Date Applied:     FRW Outreach:   23: Sabrina Tate (FRW) called pt to discuss income for Critical access hospital benefits. Family of 4 eating together. Pt is  and spouse gives money to pt to buy groceries and they file taxes together. Spouse is living with his daughter outside of the house for the time being. Both children make $500 per week and pt will speak with spouse about his income. ($4,000 per month for children). Spouse makes $2,400 per month. Family of 4 is over income for [facility] Benefits, routing note to CC team.  23  FRW called patient attempt x2 and left a vm with call back information. FRW will make outreach next week  23  FRW called patient and left a vm with call back information. FRW will make outreach next week  23 FRW called Patient and we went over screening questions. Patient was not sure what the house hold income was at this time and so she is going to work on getting that information for the FRW and we set up a time to complete screening and maybe apply for benefits if needed.    SNAP/CASH Application Screenin. Have you had Critical access hospital benefits before? Yes   2. How many people in the household, do you eat/buy food together? 5  3. What is your monthly income (include all tax members)?   4. Do you have a bank account?   5. Do you have any utility bills (electricity, rent, mortgage, phone, insurance, medical bills, etc.)?   6. Do you have social security cards and/or green cards?       Health Insurance:      Referral/Screening:  FRW Screening    Row Name [phone]       [facility] Benefits   Is patient requesting help applying for Critical access hospital benefits? Yes       Have you recently applied for any Critical access hospital benefits? No       What was applied for?  SNAP;CASH       How many people in your household? 5       Do you buy/eat food together? Yes       What is the  monthly gross income for the household (wages, social security, workers comp, and pension)?  7500       Insurance:   [place] for [place] insurance? No       Is this an insurance renewal? No       Is this a new insurance application request? No       OTHER   Is this a selena care application? No       Any other information for the FRW? Patient needs Jose D/Yany

## 2023-06-05 NOTE — TELEPHONE ENCOUNTER
Spoke with Nelda, at [facility] and they are accepting P/T orders,  Order is faxed to [phone]

## 2023-06-06 DIAGNOSIS — L30.9 DERMATITIS: ICD-10-CM

## 2023-06-07 ENCOUNTER — PATIENT OUTREACH (OUTPATIENT)
Dept: CARE COORDINATION | Facility: CLINIC | Age: 58
End: 2023-06-07
Payer: COMMERCIAL

## 2023-06-07 RX ORDER — TRIAMCINOLONE ACETONIDE 1 MG/G
CREAM TOPICAL
Qty: 80 G | Refills: 3 | Status: SHIPPED | OUTPATIENT
Start: 2023-06-07

## 2023-06-07 NOTE — TELEPHONE ENCOUNTER
"Last Written Prescription Date:  1/13/22  Last Fill Quantity: 80,  # refills: 3   Last office visit provider:  4/24/23     Requested Prescriptions   Pending Prescriptions Disp Refills     triamcinolone (KENALOG) 0.1 % external cream [Pharmacy Med Name: Triamcinolone Acetonide External Cream 0.1 %] 80 g 0     Sig: Apply 1 gm to arms 2 times daily       Topical Steroids and Nonsteroidals Protocol Passed - 6/6/2023  4:13 PM        Passed - Patient is age 6 or older        Passed - Authorizing prescriber's most recent note related to this medication read.     If refill request is for ophthalmic use, please forward request to provider for approval.          Passed - High potency steroid not ordered        Passed - Recent (12 mo) or future (30 days) visit within the authorizing provider's specialty     Patient has had an office visit with the authorizing provider or a provider within the authorizing providers department within the previous 12 mos or has a future within next 30 days. See \"Patient Info\" tab in inbasket, or \"Choose Columns\" in Meds & Orders section of the refill encounter.              Passed - Medication is active on med list             Katelyn Reynoso RN 06/07/23 1:25 PM  "

## 2023-06-07 NOTE — PROGRESS NOTES
Clinic Care Coordination Contact  Care Coordination Clinician Chart Review    Situation: Patient chart reviewed by Care Coordinator.       Background: Care Coordination Program started: 10/29/2022. Initial assessment completed 10/31/22 and patient-centered care plan(s) were developed with participation from patient. Lead CC handed patient off to CHW for continued outreaches.       Assessment: Per chart review, patient outreach completed by CC CHW on 5/4/23. Patient is actively working to accomplish goal(s). Patient's goal(s) appropriate and relevant at this time.     Patient is due for updated Plan of Care.      Assessments will be completed annually or as needed/with change of patient status. Due 10/31/23.        Care Plan: Financial Wellbeing     Problem: Patient expresses financial resource strain     Priority: High    Goal: Create an action plan to increase financial stability     Start Date: 11/2/2022 Expected End Date: 12/30/2022    This Visit's Progress: 30% Recent Progress: 40%    Note:     Barriers: Language and lack of community resources.   Strengths: has strong family support  Patient expressed understanding of goal: yes.    Action steps to achieve this goal:  1. I submitted required documents to the County on 12/08/2022.   2. I will continue answering my phone when FRW calls for follow up.   3. I will answer the phone when  calls.   4. I will update CCC team at outreach.     Goal update: 12/20/2022                           Plan/Recommendations: The patient will continue working with Care Coordination to achieve goal(s) as above.     CHW will continue outreaches at minimum every 30 days and will involve Lead CC as needed or if patient is ready to move to Maintenance.     FRW to follow up per workflow.     Lead CC will continue to monitor CHW outreaches and patient's progress to goal(s) every 6 weeks.     Plan of Care updated and sent to patient: Yes, via mail    Beverly Bran  LSW   Social Work [facility] Care Coordinator

## 2023-06-07 NOTE — PROGRESS NOTES
Clinic Care Coordination Contact    Community Health Worker Follow Up    Care Gaps:     Health Maintenance Due   Topic Date Due     DTAP/TDAP/TD IMMUNIZATION (1 - Tdap) Never done     ZOSTER IMMUNIZATION (1 of 2) Never done     COVID-19 Vaccine (3 - Booster for Marlys series) 05/06/2022     HEPATITIS B IMMUNIZATION (2 of 3 - 19+ 3-dose series) 11/02/2022       Postponed to PCP visit.     Care Plan:   Care Plan: Financial Wellbeing     Problem: Patient expresses financial resource strain     Priority: High    Goal: Create an action plan to increase financial stability     Start Date: 11/2/2022 Expected End Date: 12/30/2022    This Visit's Progress: 100% Recent Progress: 30%    Note:     Barriers: Language and lack of community resources.   Strengths: has strong family support  Patient expressed understanding of goal: yes.    Action steps to achieve this goal:  1. I submitted required documents to the [facility] on 12/08/2022.   2. I will continue answering my phone when FRW calls for follow up.   3. I will answer the phone when  calls.   4. I will update CCC team at outreach.     Goal update: 12/20/2022 06/07/2023 - patient does not qualify for [facility] benefits (over income)                        ** CHW spoke to patient and reviewed FRW note from 06/05 stating that patient does not qualify for [facility] benefits due to family being over income level. Patient expressed understanding and stated she does not have any other concerns or goals for CC at this time. CHW let patient know to reach out if any needs arise. Patient ended call abruptly.    Intervention and Education during outreach: N/A    CHW Plan: CHW will route to CC clinician to review for graduation.

## 2023-06-07 NOTE — LETTER
[facility]  Patient Centered Plan of Care  About Me:        Patient Name:  Law Cruz    YOB: 1965  Age:         58 year old   Jesus MRN:    [id number] Telephone Information:  Home Phone [phone]   Mobile [phone]       Address:  [address] [place], [place] [address]  [place] [address] Email address:  No e-mail address on record      Emergency Contact(s)    Name Relationship Lgl Grd Work Phone Home Phone Mobile Phone   1. BRUNO,DIEGO Son   [phone]    2. BRUNO,SAY Son   [phone]            Primary language:  [employer]      needed? Yes   [facility] [employer]:  [phone] op. 1  Other communication barriers:Language barrier  Preferred Method of Communication:     Current living arrangement: I live in a private home with family  Mobility Status/ Medical Equipment: No data recorded    Health Maintenance  Health Maintenance Reviewed: Due/Overdue   Health Maintenance Due   Topic Date Due    DTAP/TDAP/TD IMMUNIZATION (1 - Tdap) Never done    ZOSTER IMMUNIZATION (1 of 2) Never done    COVID-19 Vaccine (3 - Booster for Marlys series) 05/06/2022    HEPATITIS B IMMUNIZATION (2 of 3 - 19+ 3-dose series) 11/02/2022       My Access Plan  Medical Emergency 911   Primary Clinic Line [facility] [phone]   24 Hour Appointment Line [phone] or  [phone]) (toll-free)   24 Hour Nurse Line [phone] (toll-free)   Preferred Urgent Care [facility] [phone]       [facility]  [phone]       Preferred Pharmacy [facility] - [place], [place] - [address] Behavioral Health Crisis Line The National Suicide Prevention Lifeline at 1-585.926.8954 or Text/Call 798             My Care Team Members  Patient Care Team         Relationship Specialty Notifications Start Campbell Infante MD PCP - General   12/19/11     Phone: [phone] Fax: [phone]          [address] [facility] [address] [place] [address]    Milka Flores LGSW Lead Care Coordinator  Admissions 10/31/22     Campbell Honeycutt MD Assigned PCP   11/5/22     Phone: [phone] Fax: [phone]         [address] [place] [address]    Janae Yepez, MA Community Health Worker  Admissions 12/28/22               My Care Plans  Self Management and Treatment Plan  Care Plan  Care Plan: Financial Wellbeing       Problem: Patient expresses financial resource strain       Priority: High      Goal: Create an action plan to increase financial stability       Start Date: 11/2/2022 Expected End Date: 12/30/2022    This Visit's Progress: 30% Recent Progress: 40%    Note:     Barriers: Language and lack of community resources.   Strengths: has strong family support  Patient expressed understanding of goal: yes.    Action steps to achieve this goal:  1. I submitted required documents to the [facility] on 12/08/2022.   2. I will continue answering my phone when FRW calls for follow up.   3. I will answer the phone when  calls.   4. I will update CCC team at outreach.     Goal update: 12/20/2022                               Action Plans on File:                       Advance Care Plans/Directives Type:   No data recorded    My Medical and Care Information  Problem List   Patient Active Problem List   Diagnosis    Carpal Tunnel Syndrome    Anemia    Backache    Memory loss    Meningioma (H)    Chronic bilateral low back pain with bilateral sciatica      Current Medications and Allergies:  See printed Medication Report.    Care Coordination Start Date: 10/29/2022   Frequency of Care Coordination: monthly       Form Last Updated: 06/07/2023

## 2023-06-08 ENCOUNTER — PATIENT OUTREACH (OUTPATIENT)
Dept: CARE COORDINATION | Facility: CLINIC | Age: 58
End: 2023-06-08
Payer: COMMERCIAL

## 2023-06-08 NOTE — PROGRESS NOTES
Clinic Care Coordination Contact    Situation: Patient chart reviewed by care coordinator.    Background: pt enrolled for support in obtaining [facility] benefits     Assessment: pt is over income for [facility] benefits. Pt denied any ongoing needs.     Plan/Recommendations: CCSW graduated pt    JONATHAN Rosenthal, [facility]  Social Work Care Coordinator

## 2023-07-26 ENCOUNTER — OFFICE VISIT (OUTPATIENT)
Dept: FAMILY MEDICINE | Facility: CLINIC | Age: 58
End: 2023-07-26
Attending: FAMILY MEDICINE
Payer: COMMERCIAL

## 2023-07-26 VITALS
OXYGEN SATURATION: 97 % | HEIGHT: 62 IN | WEIGHT: 147.25 LBS | SYSTOLIC BLOOD PRESSURE: 108 MMHG | RESPIRATION RATE: 16 BRPM | TEMPERATURE: 98.5 F | DIASTOLIC BLOOD PRESSURE: 70 MMHG | BODY MASS INDEX: 27.1 KG/M2 | HEART RATE: 68 BPM

## 2023-07-26 DIAGNOSIS — M54.42 CHRONIC BILATERAL LOW BACK PAIN WITH BILATERAL SCIATICA: ICD-10-CM

## 2023-07-26 DIAGNOSIS — M54.41 CHRONIC BILATERAL LOW BACK PAIN WITH BILATERAL SCIATICA: ICD-10-CM

## 2023-07-26 DIAGNOSIS — G89.29 CHRONIC BILATERAL LOW BACK PAIN WITH BILATERAL SCIATICA: ICD-10-CM

## 2023-07-26 PROCEDURE — 99214 OFFICE O/P EST MOD 30 MIN: CPT | Performed by: FAMILY MEDICINE

## 2023-07-26 NOTE — PROGRESS NOTES
Assessment & Plan     Chronic bilateral low back pain with bilateral sciatica    Ongoing pain, not controlled.    She has a follow-up appointment at the spine clinic on July 31.    I explained to her that if an injection would be considered, it would be expected that it might take a few days to bring about improvement.  Potentially improvement could last for a number of months.    She asked if an injection could be given at the time that pain is severe.  I explained that it would be something that is scheduled, and would not immediately have effect on severe pain.    She states that she would consider an injection, if it is recommended at the spine clinic.        I recommend that she avoid heavy lifting, because this can worsen her back and leg pain, and also the lower abdominal pain that she experiences.        She will return in February for physical, or sooner if any problems.    - PRIMARY CARE FOLLOW-UP SCHEDULING      [facility] .           35 minutes spent by me on the date of the encounter doing chart review, review of test results, patient visit, and documentation regarding low back pain with sciatica.           Campbell Honeycutt MD  [facility] DEREJE Hallman is a 58 year old, presenting for the following health issues:  Dizziness and Follow Up (Back pain radiates to bilateral legs )      7/26/2023    10:32 AM   Additional Questions   Roomed by Trina Diaz   Accompanied by Self     History of Present Illness       Reason for visit:  F/u back pain/leg pain and dizziness        She continues to have bilateral sciatica.  She has been doing home exercises, as instructed by physical therapy.  She reports that she has more pain after doing the exercises.    She states that she takes one gabapentin 100 mg capsule when she has severe pain, which is about once per week.  She states that she feels dizzy after taking that dose.    She  "states that her sleep is disrupted due to pain.  She wakes frequently and needs to change position.      She also notes that the right side of her lower abdomen gets sore when she is riding in a car, or if she runs, or if she lifts something.  She states that she is not able to go to work because it is too painful while riding in the car.    Current Outpatient Medications   Medication Sig Dispense Refill    acetaminophen (TYLENOL) 500 MG tablet Take 2 tablets (1,000 mg) by mouth every 8 hours as needed for mild pain 1000 tablet 0    acetaminophen (TYLENOL) 500 MG tablet [ACETAMINOPHEN (TYLENOL) 500 MG TABLET] Take 1 tablet (500 mg total) by mouth every 6 (six) hours as needed for pain. 100 tablet 11    gabapentin (NEURONTIN) 100 MG capsule Take 1 capsule (100 mg) by mouth 2 times daily 60 capsule 11    hydrocortisone (CORTAID) 1 % external cream APPLY THIN LAYER TO BOTH ARMS TWICE A DAY      hydrocortisone-aloe 1 % CREA [HYDROCORTISONE WITH ALOE 1 % CREA CREAM] Apply thin layer to affected area twice a day 56 g 3    ibuprofen (ADVIL,MOTRIN) 400 MG tablet [IBUPROFEN (ADVIL,MOTRIN) 400 MG TABLET] Take 1 tablet (400 mg total) by mouth daily as needed for pain. 30 tablet 11    meclizine (ANTIVERT) 25 MG tablet Take 1 tablet (25 mg) by mouth 3 times daily as needed for dizziness or nausea 20 tablet 0    polyethylene glycol (MIRALAX) 17 GM/Dose powder Take 17 g (1 capful) by mouth daily 507 g 11    triamcinolone (KENALOG) 0.1 % external cream Apply 1 gm to arms 2 times daily 80 g 3           Review of Systems   No fever or cough.      Objective    /70 (BP Location: Right arm, Patient Position: Sitting, Cuff Size: Adult Regular)   Pulse 68   Temp 98.5  F (36.9  C) (Oral)   Resp 16   Ht 1.575 m (5' 2\")   Wt 66.8 kg (147 lb 4 oz)   LMP 05/01/2021 (Within Weeks)   SpO2 97%   BMI 26.93 kg/m    Body mass index is 26.93 kg/m .  Physical Exam   Abdomen normal  Straight leg raise causes pain from the back to the " posterior calf area bilateral

## 2023-07-28 ENCOUNTER — HOSPITAL ENCOUNTER (EMERGENCY)
Facility: HOSPITAL | Age: 58
Discharge: HOME | End: 2023-07-29
Attending: EMERGENCY MEDICINE | Admitting: EMERGENCY MEDICINE
Payer: COMMERCIAL

## 2023-07-28 VITALS
HEART RATE: 65 BPM | TEMPERATURE: 97.7 F | SYSTOLIC BLOOD PRESSURE: 137 MMHG | RESPIRATION RATE: 20 BRPM | DIASTOLIC BLOOD PRESSURE: 83 MMHG

## 2023-07-28 DIAGNOSIS — N30.00 ACUTE CYSTITIS WITHOUT HEMATURIA: ICD-10-CM

## 2023-07-28 LAB
ALBUMIN UR-MCNC: NEGATIVE MG/DL
APPEARANCE UR: CLEAR
BACTERIA #/AREA URNS HPF: ABNORMAL /HPF
BILIRUB UR QL STRIP: NEGATIVE
COLOR UR AUTO: COLORLESS
GLUCOSE UR STRIP-MCNC: NEGATIVE MG/DL
HCG UR QL: NEGATIVE
HGB UR QL STRIP: ABNORMAL
KETONES UR STRIP-MCNC: NEGATIVE MG/DL
LEUKOCYTE ESTERASE UR QL STRIP: ABNORMAL
NITRATE UR QL: NEGATIVE
PH UR STRIP: 6 [PH] (ref 5–7)
RBC URINE: 0 /HPF
SP GR UR STRIP: 1 (ref 1–1.03)
SQUAMOUS EPITHELIAL: <1 /HPF
UROBILINOGEN UR STRIP-MCNC: <2 MG/DL
WBC URINE: 11 /HPF

## 2023-07-28 PROCEDURE — 87086 URINE CULTURE/COLONY COUNT: CPT | Performed by: EMERGENCY MEDICINE

## 2023-07-28 PROCEDURE — 81001 URINALYSIS AUTO W/SCOPE: CPT | Performed by: EMERGENCY MEDICINE

## 2023-07-28 PROCEDURE — 99283 EMERGENCY DEPT VISIT LOW MDM: CPT

## 2023-07-28 PROCEDURE — 81025 URINE PREGNANCY TEST: CPT | Performed by: EMERGENCY MEDICINE

## 2023-07-28 RX ORDER — CEPHALEXIN 500 MG/1
500 CAPSULE ORAL 3 TIMES DAILY
Qty: 21 CAPSULE | Refills: 0 | Status: SHIPPED | OUTPATIENT
Start: 2023-07-28 | End: 2023-08-04

## 2023-07-29 NOTE — ED TRIAGE NOTES
The patient was diagnosed with a UTI 5 months ago. She states that the pain in her bladder never got better. She came in today because the pain got worse. She reports pain with urination.      Triage Assessment       Row Name 07/28/23 1519       Triage Assessment (Adult)    Airway WDL WDL       Skin Circulation/Temperature WDL    Skin Circulation/Temperature WDL WDL       Cognitive/Neuro/Behavioral WDL    Cognitive/Neuro/Behavioral WDL WDL

## 2023-07-30 LAB — BACTERIA UR CULT: NORMAL

## 2023-07-31 ENCOUNTER — OFFICE VISIT (OUTPATIENT)
Dept: PHYSICAL MEDICINE AND REHAB | Facility: CLINIC | Age: 58
End: 2023-07-31
Attending: FAMILY MEDICINE
Payer: COMMERCIAL

## 2023-07-31 VITALS
SYSTOLIC BLOOD PRESSURE: 114 MMHG | OXYGEN SATURATION: 97 % | DIASTOLIC BLOOD PRESSURE: 71 MMHG | HEART RATE: 58 BPM | RESPIRATION RATE: 16 BRPM

## 2023-07-31 DIAGNOSIS — M51.26 LUMBAR DISC HERNIATION: ICD-10-CM

## 2023-07-31 DIAGNOSIS — M54.41 CHRONIC BILATERAL LOW BACK PAIN WITH BILATERAL SCIATICA: ICD-10-CM

## 2023-07-31 DIAGNOSIS — M54.42 CHRONIC BILATERAL LOW BACK PAIN WITH BILATERAL SCIATICA: ICD-10-CM

## 2023-07-31 DIAGNOSIS — G89.29 CHRONIC BILATERAL LOW BACK PAIN WITH BILATERAL SCIATICA: ICD-10-CM

## 2023-07-31 PROCEDURE — 99215 OFFICE O/P EST HI 40 MIN: CPT | Performed by: NURSE PRACTITIONER

## 2023-07-31 ASSESSMENT — PAIN SCALES - GENERAL: PAINLEVEL: MODERATE PAIN (5)

## 2023-07-31 NOTE — PATIENT INSTRUCTIONS
It was a pleasure seeing you in the clinic today.  As discussed, we made the following updates to your plan of care:     I will ask our nursing staff to help coordinate the home physical therapy with you, and reach out to you to make sure it's scheduled.    Let's follow up after 6 weeks of physical therapy and home exercise program to see how you are doing and talk about next steps.     Thank you,  Carol Ann Rios NP  Physical Medicine and Rehabilitation, Medical Spine  [facility] clinic Phone: [phone]  [facility] Fax: [phone]

## 2023-07-31 NOTE — LETTER
7/31/2023       RE: Law Cruz  [address]  [place] [address]     Dear Colleague,    Thank you for referring your patient, Law Cruz, to the [facility] [place] [facility]. Please see a copy of my visit note below.    PM&R Follow-Up Visit -     Date of Initial Visit: 5/16/23  LOV: 6/2/23  TD: 7/31/2023     Recall: Law Cruz is a 58 year old female who presents with a chief complaint of low back pain with sciatica.   She was seen today in the clinic with  assistance.    INTERVAL HISTORY:  Patient was last seen in clinic 6/2/23. At that visit, the plan was to add order for home PT. She was seen today for follow-up.    Since last time she reports ongoing numbness in both legs, worse on the right leg.  She says the numbness has been getting worse.  The pain and numbness travels along the side of her leg, into the top of her right foot and toes.  When she has more severe pain she also has pain on the bottom of her foot.    Today she brought her medications to the clinic so we could review them together.  She has a prescription for gabapentin 200 mg at bedtime.  She says even if she takes just 100 mg at bedtime she feels drowsy the next day, so she tries to limit the gabapentin. She also takes naproxen 500 mg twice daily as needed if she is having more severe pain.    Unfortunately she says that she did not receive a call from the home physical therapy company and did not start PT since last time.    Since last time, she also reports that she was seen in the ED at [facility] for UTI. She is currently still taking cephalexin as prescribed and she says she is feeling a little better. We discussed following up with her PCP and she asked for help getting that appointment set up.     She denies bowel or bladder incontinence or saddle anesthesia.        RECALL HISTORY OF PRESENT ILLNESS:    5/16/23:   Law Cruz is a  58 year old female who presents with a chief complaint of low back pain with sciatica.  Visit completed with  assistance.  Her son was present as well.    She reports a history of chronic pain that started about 10 years ago.  The pain affects her low back R>L. She says sometimes the back pain radiates to the right groin. At times the pain radiates down the side and back of her legs into the feet (R>L).      She also reports a longstanding history of abdominal pain that she says started after her having a miscarriage 10 years ago. She says when the abdominal pain is bad, it exacerbates the leg pain. She says the abdominal discomfort is what bothers her the most today.  She describes the abdominal pain as constant and mostly mild for the last 10 years.  However when she does heavy lifting there is some increased abdominal pain.  She reports occasional constipation but her last bowel movement was this morning. She denies nausea or vomiting.    The pain is worse with heavy lifting, turning, or with riding in a car.  When she sitting still she does not have much pain.  She uses paracetamol and heat for the pain. She does not use much gabapentin because it causes her to feel lightheaded.    She denies falls, weakness, bowel incontinence, saddle anesthesia, unintentional weight loss, fevers/chills, or drenching night sweats. She endorses some leaking urine if she is very full after eating and sometimes says she can't always tell if she needs to urinate, but says that has been the case x10 years.     6/2/2023:  Patient was last seen in clinic 5/16/2023. At that visit, the plan was to complete the MRI of the lumbar spine which had been ordered by her PCP, obtain an x-ray of the pelvis and hips, and begin physical therapy.     She was seen today with  assistance, to review the imaging findings.     She says she has no back pain today but does endorse numbness in both legs (R>L) extending to the toes. The  numbness runs along the side of the leg into the whole foot and toes (R>L). The numbness has been worsening for awhile though she is unable to say for how long. She does endorse some occasional weakness L>R.     She endorses one fall when she first moved to [place], 10-11 years ago.  She was unsure if the pain and numbness started after the fall or not.  She denies any recent falls.  She endorses baseline urinary incontinence for a long time.  She denies bowel incontinence or saddle anesthesia.    She was not able to start physical therapy. She relies on her son to take her to appointments but he is not always available, so she says it is difficult for her to get to appointments. She missed the therapy appt 5/23.    She was not able to review her medications with me so she was unable to comment on efficacy or side effects of the gabapentin.  She says that she manages her own medications at home.      PRIOR INJURIES/TREATMENT:   Ice/Heat: heat    Brace: none    Physical Therapy:   Was referred for home physical therapy in the last visit but says she did not receive a call to set up an appointment with them  No-show for PT appointment 5/22/2023  Did PT 1-2 years ago for low back pain     - Current Pain Medications  Naproxen 500 mg twice daily as needed-infrequently for more severe pain  Gabapentin 200 mg at bedtime-she takes 100 mg as needed but it makes her drowsy  Tylenol    - Prior/Trialed Pain Medications -   ibuprofen    Prior Procedures:  Date    Procedure   Improvement (%)  none              Prior Related Surgery: none     Other (acupuncture, OMT, CMM, TENS, DME, etc.): none      Specialists Seen - (with most recent, available notes and clinic visits reviewed)   1. none    IMAGING - reviewed     2 views pelvis and bilateral hip radiograph(s) 5/16/2023   Findings:  AP view(s) of the pelvis, frog-leg lateral views of each hip were  obtained.      No acute osseous abnormality.  No substantial degenerative change  of hips.  Sacrum and innominate bones are partially obscured by overlying bowel  gas/fecal content.                                                        Impression:  1. No acute osseous abnormality.  2. No substantial degenerative change.      MR LUMBAR SPINE WITHOUT CONTRAST 05/19/2023  FINDINGS:   Nomenclature is based on five lumbar-type vertebral bodies. Satisfactory lumbar vertebral body height, with 1 mm degenerative anterior subluxation L4-L5. Mild disc desiccation mid and lower lumbar levels as before. Satisfactory sacral alignment. Nothing for sacral insufficiency or stress fracture. No presacral mass or fluid collections. Leftward lumbar curve. Mild mid and lower lumbar spine facet joint degenerative arthropathy involving the facet joints with degenerative changes both SI joints. Sacral neural foramina appear intact. Normal distal spinal cord and cauda equina with conus medullaris at L1. Nerve roots of the cauda equina are satisfactory without nodularity or thickening. No retroperitoneal solid mass or adenopathy. Normal caliber abdominal aorta. Symmetric psoas appearance bilaterally. Satisfactory renal contours. No pelvic mass or adenopathy. Benign vertebral body hemangioma L3. Nothing for a marrow infiltrative process.     T12-L1: Normal disc height and signal. No herniation. Normal facets. No spinal canal or neural foraminal stenosis.      L1-L2: Normal disc height and signal. No herniation. Normal facets. No spinal canal or neural foraminal stenosis.     L2-L3: Normal disc height with disc desiccation and annular bulge. No herniation. Normal facets. Mild spinal stenosis. No foraminal narrowing. New from prior study is a focal annular tear/disruption in the central zone series 2 image 9.      L3-L4: Satisfactory disc height with annular bulge asymmetric to the left as before. Mild spinal stenosis. Mild foraminal narrowing, left more than right, unchanged. Mild encroachment left L4 subarticular zone  with mild to moderate facet joint   arthropathy, stable from previous     L4-L5: Satisfactory disc height with generalized disc bulge. Shallow broad-based central disc protrusion with moderate to severe canal stenosis and moderate bilateral foraminal narrowing. The degree of spinal stenosis has slightly progressed from prior study. Annular tear/disruption focally in the central zone as before with mild to moderate facet joint arthropathy.     L5-S1: Satisfactory disc height. Annular bulge. Shallow broad-based central and left disc protrusion as before without spinal stenosis. Mild foraminal narrowing on the left. Annular tear/disruption left foraminal zone is new, and is in proximity to the exited left L5 nerve root, correlate for left L5 radicular presentation. Slight contact of the traversing left S1 nerve root as before could correlate to left S1 radicular presentation series 5 image 10. Mild facet joint arthropathy bilaterally.                                                                      IMPRESSION:  1.  Slight progression of degenerative changes overall in the lumbar spine from previous MR 06/30/2018.     2.  Changes are most significant at lower lumbar levels, slight progression of spinal stenosis at L4-L5 now estimated moderate to severe.     3.  Annular tear/disruption focally in the left foraminal zone is now present at left L5-S1 level, abuts the exited left L5 nerve root with mild left foraminal stenosis. Proximity to annular tear can also represent possible etiology for radiculopathy left L5 nerve root. Stable possible etiology for left S1 radiculopathy is discussed at this level due to contact of traversing left S1 nerve root.     4.  See above for details and description.      US PELVIS COMPLETE W TRANSVAGINAL AND DOPPLER LIMITED: 10/18/2022   IMPRESSION:    1.  Normal pelvic ultrasound.      Review Of Systems:  I am responding to those symptoms which are directly relevant to the specific  indication for my consultation. I recommend that the patient follow up with their primary or referring provider to pursue any other symptoms which may be of concern.       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     She  has no past surgical history on file.    Family History  Her family history is not on file.     Social History:  Work: She states that she has not worked since arriving in [place] and that she is not able to perform any strenuous activity or ride in a vehicle without pain.  She  reports that she has never smoked. Her smokeless tobacco use includes chew. She reports that she does not drink alcohol and does not use drugs.        Current Medications:   She has a current medication list which includes the following prescription(s): acetaminophen, acetaminophen, gabapentin, hydrocortisone, hydrocortisone-aloe, ibuprofen, meclizine, polyethylene glycol, and triamcinolone.     Allergies:    -- No Known Drug Allergy -- Unknown    PHYSICAL EXAMINATION:  /71   Pulse 58   Resp 16   LMP 05/01/2021 (Within Weeks)   SpO2 97%    --CONSTITUTIONAL: Vital signs as above. No acute distress. The patient is well nourished and well groomed.  --PSYCHIATRIC: The patient is awake, alert, oriented to person, place, time and answering questions appropriately with clear speech. Appropriate mood and affect   --SKIN:  Posterior torso is clean, dry, intact without rashes.   --RESPIRATORY: Normal rhythm and effort. No abnormal accessory muscle breathing patterns noted.   --GROSS MOTOR: Easily arises from a seated position. Toe walking and heel walking are normal.    --STANDING EXAMINATION: Gait is non-antalgic. Normal lumbar lordosis noted, no lateral shift.  --LOWER EXTREMITY MOTOR TESTING:  Plantar flexion  left 5/5, right 5/5   Dorsiflexion left 5/5, right 5/5   Great toe MTP extension left 5/5, right 5/5  Knee flexion left 5/5, right 5/5  Knee extension left 5/5, right 5/5   Hip flexion left 5/5, right 5/5   --MUSCULOSKELETAL: Lumbar spine inspection reveals no evidence of deformity. Range of motion is not limited in lumbar flexion, extension, or lateral rotation.  Lumbar flexion and extension elicit back and leg pain.  Seated slump test positive bilat. Sciatic notch non-tender. Facet loading maneuvers are negative bilaterally.    --NEUROLOGIC: 2/4 patellar and achilles reflexes bilaterally.  Decreased sensation of bilateral feet & upper and lower lateral legs R>L. No clonus.    --VASCULAR:  Warm lower limbs bilaterally. There is no pitting edema of the bilateral lower extremities.       ASSESSMENT:  Law Cruz is a pleasant 58 year old female who presents with chronic, longstanding bilateral low back pain with R>L lower extremity numbness, which she describes corresponding to a L5 distribution    #Chronic bilateral low back pain with radiculopathy R>L  #Lower extremity and bilateral foot numbness R>L  #Lumbar neuroforaminal stenosis  #Spinal canal stenosis, moderate to severe at L4-L5  #Lumbar spondylosis    X-ray of the pelvis and hips 5/16/2023 shows no substantial degenerative change of the hips and no fracture or dislocation    MRI of the lumbar spine 5/19/2023 shows a slight progression of the degenerative changes seen on the previous MRI in 2018, which is most notable at the lower lumbar area.  Spinal stenosis at L4-L5 is moderate to severe.  There is annular tear/disruption focally in the left foraminal zone at the left L5-S1 level, abutting the exiting left L5 nerve root with associated mild left foraminal stenosis,  possible etiology for left S1 radiculopathy well due to contacting the left S1 nerve root.  There is mild right neural foraminal stenosis at the L5-S1 level as well.    PLAN:  -MRI lumbar spine  reviewed  -She takes 100 mg of gabapentin but has difficulty taking that regularly due to drowsiness the next day  -continue PRN tylenol and PRN naproxen  -will coordinate with nursing to help her schedule the home PT. The patient prefers to start with physical therapy only, but unfortunately says she was not contacted by the home PT agency after the last visit.   -could consider L5-S1 interlaminar epidural steroid injection, however she prefers to avoid any interventions at this point.  -could consider EMG BLE in future  -per patient's request, will message her PCP asking for scheduling help to set up a post ED visit for regarding the UTI  -RTC 6 weeks after home exercise/PT program      Ready to learn, no apparent learning barriers.  Education provided on treatment plan according to patient's preferred learning style.  Patient verbalizes understanding.   __________________________________    45 minutes spent by me on the date of the encounter doing chart review, history and exam, documentation and further activities per the note         Again, thank you for allowing me to participate in the care of your patient.      Sincerely,    LELA Stern CNP

## 2023-07-31 NOTE — PROGRESS NOTES
PM&R Follow-Up Visit -     Date of Initial Visit: 5/16/23  LOV: 6/2/23  TD: 7/31/2023     Recall: Law Cruz is a 58 year old female who presents with a chief complaint of low back pain with sciatica.   She was seen today in the clinic with  assistance.      INTERVAL HISTORY:  Patient was last seen in clinic 6/2/23. At that visit, the plan was to add order for home PT. She was seen today for follow-up.    Since last time she reports ongoing numbness in both legs, worse on the right leg.  She says the numbness has been getting worse.  The pain and numbness travels along the side of her leg, into the top of her right foot and toes.  When she has more severe pain she also has pain on the bottom of her foot.    Today she brought her medications to the clinic so we could review them together.  She has a prescription for gabapentin 200 mg at bedtime.  She says even if she takes just 100 mg at bedtime she feels drowsy the next day, so she tries to limit the gabapentin. She also takes naproxen 500 mg twice daily as needed if she is having more severe pain.    Unfortunately she says that she did not receive a call from the home physical therapy company and did not start PT since last time.    Since last time, she also reports that she was seen in the ED at [facility] for UTI. She is currently still taking cephalexin as prescribed and she says she is feeling a little better. We discussed following up with her PCP and she asked for help getting that appointment set up.     She denies bowel or bladder incontinence or saddle anesthesia.        RECALL HISTORY OF PRESENT ILLNESS:    5/16/23:   Law Cruz is a 58 year old female who presents with a chief complaint of low back pain with sciatica.  Visit completed with  assistance.  Her son was present as well.    She reports a history of chronic pain that started about 10 years ago.  The pain affects her low back R>L. She says sometimes the back pain radiates to the  right groin. At times the pain radiates down the side and back of her legs into the feet (R>L).      She also reports a longstanding history of abdominal pain that she says started after her having a miscarriage 10 years ago. She says when the abdominal pain is bad, it exacerbates the leg pain. She says the abdominal discomfort is what bothers her the most today.  She describes the abdominal pain as constant and mostly mild for the last 10 years.  However when she does heavy lifting there is some increased abdominal pain.  She reports occasional constipation but her last bowel movement was this morning. She denies nausea or vomiting.    The pain is worse with heavy lifting, turning, or with riding in a car.  When she sitting still she does not have much pain.  She uses paracetamol and heat for the pain. She does not use much gabapentin because it causes her to feel lightheaded.    She denies falls, weakness, bowel incontinence, saddle anesthesia, unintentional weight loss, fevers/chills, or drenching night sweats. She endorses some leaking urine if she is very full after eating and sometimes says she can't always tell if she needs to urinate, but says that has been the case x10 years.     6/2/2023:  Patient was last seen in clinic 5/16/2023. At that visit, the plan was to complete the MRI of the lumbar spine which had been ordered by her PCP, obtain an x-ray of the pelvis and hips, and begin physical therapy.     She was seen today with  assistance, to review the imaging findings.     She says she has no back pain today but does endorse numbness in both legs (R>L) extending to the toes. The numbness runs along the side of the leg into the whole foot and toes (R>L). The numbness has been worsening for awhile though she is unable to say for how long. She does endorse some occasional weakness L>R.     She endorses one fall when she first moved to [place], 10-11 years ago.  She was unsure if the pain and  numbness started after the fall or not.  She denies any recent falls.  She endorses baseline urinary incontinence for a long time.  She denies bowel incontinence or saddle anesthesia.    She was not able to start physical therapy. She relies on her son to take her to appointments but he is not always available, so she says it is difficult for her to get to appointments. She missed the therapy appt 5/23.    She was not able to review her medications with me so she was unable to comment on efficacy or side effects of the gabapentin.  She says that she manages her own medications at home.      PRIOR INJURIES/TREATMENT:   Ice/Heat: heat    Brace: none    Physical Therapy:   Was referred for home physical therapy in the last visit but says she did not receive a call to set up an appointment with them  No-show for PT appointment 5/22/2023  Did PT 1-2 years ago for low back pain     - Current Pain Medications  Naproxen 500 mg twice daily as needed-infrequently for more severe pain  Gabapentin 200 mg at bedtime-she takes 100 mg as needed but it makes her drowsy  Tylenol    - Prior/Trialed Pain Medications -   ibuprofen    Prior Procedures:  Date    Procedure   Improvement (%)  none              Prior Related Surgery: none     Other (acupuncture, OMT, CMM, TENS, DME, etc.): none      Specialists Seen - (with most recent, available notes and clinic visits reviewed)   1. none    IMAGING - reviewed     2 views pelvis and bilateral hip radiograph(s) 5/16/2023   Findings:  AP view(s) of the pelvis, frog-leg lateral views of each hip were  obtained.      No acute osseous abnormality.  No substantial degenerative change of hips.  Sacrum and innominate bones are partially obscured by overlying bowel  gas/fecal content.                                                        Impression:  1. No acute osseous abnormality.  2. No substantial degenerative change.      MR LUMBAR SPINE WITHOUT CONTRAST 05/19/2023  FINDINGS:   Nomenclature  is based on five lumbar-type vertebral bodies. Satisfactory lumbar vertebral body height, with 1 mm degenerative anterior subluxation L4-L5. Mild disc desiccation mid and lower lumbar levels as before. Satisfactory sacral alignment. Nothing for sacral insufficiency or stress fracture. No presacral mass or fluid collections. Leftward lumbar curve. Mild mid and lower lumbar spine facet joint degenerative arthropathy involving the facet joints with degenerative changes both SI joints. Sacral neural foramina appear intact. Normal distal spinal cord and cauda equina with conus medullaris at L1. Nerve roots of the cauda equina are satisfactory without nodularity or thickening. No retroperitoneal solid mass or adenopathy. Normal caliber abdominal aorta. Symmetric psoas appearance bilaterally. Satisfactory renal contours. No pelvic mass or adenopathy. Benign vertebral body hemangioma L3. Nothing for a marrow infiltrative process.     T12-L1: Normal disc height and signal. No herniation. Normal facets. No spinal canal or neural foraminal stenosis.      L1-L2: Normal disc height and signal. No herniation. Normal facets. No spinal canal or neural foraminal stenosis.     L2-L3: Normal disc height with disc desiccation and annular bulge. No herniation. Normal facets. Mild spinal stenosis. No foraminal narrowing. New from prior study is a focal annular tear/disruption in the central zone series 2 image 9.      L3-L4: Satisfactory disc height with annular bulge asymmetric to the left as before. Mild spinal stenosis. Mild foraminal narrowing, left more than right, unchanged. Mild encroachment left L4 subarticular zone with mild to moderate facet joint   arthropathy, stable from previous     L4-L5: Satisfactory disc height with generalized disc bulge. Shallow broad-based central disc protrusion with moderate to severe canal stenosis and moderate bilateral foraminal narrowing. The degree of spinal stenosis has slightly progressed  from prior study. Annular tear/disruption focally in the central zone as before with mild to moderate facet joint arthropathy.     L5-S1: Satisfactory disc height. Annular bulge. Shallow broad-based central and left disc protrusion as before without spinal stenosis. Mild foraminal narrowing on the left. Annular tear/disruption left foraminal zone is new, and is in proximity to the exited left L5 nerve root, correlate for left L5 radicular presentation. Slight contact of the traversing left S1 nerve root as before could correlate to left S1 radicular presentation series 5 image 10. Mild facet joint arthropathy bilaterally.                                                                      IMPRESSION:  1.  Slight progression of degenerative changes overall in the lumbar spine from previous MR 06/30/2018.     2.  Changes are most significant at lower lumbar levels, slight progression of spinal stenosis at L4-L5 now estimated moderate to severe.     3.  Annular tear/disruption focally in the left foraminal zone is now present at left L5-S1 level, abuts the exited left L5 nerve root with mild left foraminal stenosis. Proximity to annular tear can also represent possible etiology for radiculopathy left L5 nerve root. Stable possible etiology for left S1 radiculopathy is discussed at this level due to contact of traversing left S1 nerve root.     4.  See above for details and description.      US PELVIS COMPLETE W TRANSVAGINAL AND DOPPLER LIMITED: 10/18/2022   IMPRESSION:    1.  Normal pelvic ultrasound.      Review Of Systems:  I am responding to those symptoms which are directly relevant to the specific indication for my consultation. I recommend that the patient follow up with their primary or referring provider to pursue any other symptoms which may be of concern.       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     She  has no past surgical history on file.    Family History  Her family history is not on file.     Social History:  Work: She states that she has not worked since arriving in [place] and that she is not able to perform any strenuous activity or ride in a vehicle without pain.  She  reports that she has never smoked. Her smokeless tobacco use includes chew. She reports that she does not drink alcohol and does not use drugs.        Current Medications:   She has a current medication list which includes the following prescription(s): acetaminophen, acetaminophen, gabapentin, hydrocortisone, hydrocortisone-aloe, ibuprofen, meclizine, polyethylene glycol, and triamcinolone.     Allergies:    -- No Known Drug Allergy -- Unknown    PHYSICAL EXAMINATION:  /71   Pulse 58   Resp 16   LMP 05/01/2021 (Within Weeks)   SpO2 97%    --CONSTITUTIONAL: Vital signs as above. No acute distress. The patient is well nourished and well groomed.  --PSYCHIATRIC: The patient is awake, alert, oriented to person, place, time and answering questions appropriately with clear speech. Appropriate mood and affect   --SKIN:  Posterior torso is clean, dry, intact without rashes.   --RESPIRATORY: Normal rhythm and effort. No abnormal accessory muscle breathing patterns noted.   --GROSS MOTOR: Easily arises from a seated position. Toe walking and heel walking are normal.    --STANDING EXAMINATION: Gait is non-antalgic. Normal lumbar lordosis noted, no lateral shift.  --LOWER EXTREMITY MOTOR TESTING:  Plantar flexion left 5/5, right 5/5   Dorsiflexion left 5/5, right 5/5   Great toe MTP extension left 5/5, right 5/5  Knee flexion left 5/5, right 5/5  Knee extension left 5/5, right 5/5   Hip flexion left 5/5, right 5/5   --MUSCULOSKELETAL: Lumbar spine inspection reveals no evidence of deformity. Range of motion is not  limited in lumbar flexion, extension, or lateral rotation.  Lumbar flexion and extension elicit back and leg pain.  Seated slump test positive bilat. Sciatic notch non-tender. Facet loading maneuvers are negative bilaterally.    --NEUROLOGIC: 2/4 patellar and achilles reflexes bilaterally.  Decreased sensation of bilateral feet & upper and lower lateral legs R>L. No clonus.    --VASCULAR:  Warm lower limbs bilaterally. There is no pitting edema of the bilateral lower extremities.       ASSESSMENT:  Law rCuz is a pleasant 58 year old female who presents with chronic, longstanding bilateral low back pain with R>L lower extremity numbness, which she describes corresponding to a L5 distribution    #Chronic bilateral low back pain with radiculopathy R>L  #Lower extremity and bilateral foot numbness R>L  #Lumbar neuroforaminal stenosis  #Spinal canal stenosis, moderate to severe at L4-L5  #Lumbar spondylosis    X-ray of the pelvis and hips 5/16/2023 shows no substantial degenerative change of the hips and no fracture or dislocation    MRI of the lumbar spine 5/19/2023 shows a slight progression of the degenerative changes seen on the previous MRI in 2018, which is most notable at the lower lumbar area.  Spinal stenosis at L4-L5 is moderate to severe.  There is annular tear/disruption focally in the left foraminal zone at the left L5-S1 level, abutting the exiting left L5 nerve root with associated mild left foraminal stenosis,  possible etiology for left S1 radiculopathy well due to contacting the left S1 nerve root.  There is mild right neural foraminal stenosis at the L5-S1 level as well.    PLAN:  -MRI lumbar spine reviewed  -She takes 100 mg of gabapentin but has difficulty taking that regularly due to drowsiness the next day  -continue PRN tylenol and PRN naproxen  -will coordinate with nursing to help her schedule the home PT. The patient prefers to start with physical therapy only, but unfortunately says she was not  contacted by the home PT agency after the last visit.   -could consider L5-S1 interlaminar epidural steroid injection, however she prefers to avoid any interventions at this point.  -could consider EMG BLE in future  -per patient's request, will message her PCP asking for scheduling help to set up a post ED visit for regarding the UTI  -RTC 6 weeks after home exercise/PT program          Ready to learn, no apparent learning barriers.  Education provided on treatment plan according to patient's preferred learning style.  Patient verbalizes understanding.   __________________________________  Carol Ann Rios NP  Physical Medicine & Rehabilitation        45 minutes spent by me on the date of the encounter doing chart review, history and exam, documentation and further activities per the note

## 2023-08-05 DIAGNOSIS — M54.41 CHRONIC BILATERAL LOW BACK PAIN WITH BILATERAL SCIATICA: Primary | ICD-10-CM

## 2023-08-05 DIAGNOSIS — M54.42 CHRONIC BILATERAL LOW BACK PAIN WITH BILATERAL SCIATICA: Primary | ICD-10-CM

## 2023-08-05 DIAGNOSIS — M51.26 LUMBAR DISC HERNIATION: ICD-10-CM

## 2023-08-05 DIAGNOSIS — G89.29 CHRONIC BILATERAL LOW BACK PAIN WITH BILATERAL SCIATICA: Primary | ICD-10-CM

## 2023-08-05 DIAGNOSIS — R52 PAIN: ICD-10-CM

## 2023-08-07 ENCOUNTER — DOCUMENTATION ONLY (OUTPATIENT)
Dept: PHYSICAL MEDICINE AND REHAB | Facility: CLINIC | Age: 58
End: 2023-08-07
Payer: COMMERCIAL

## 2023-08-07 NOTE — PROGRESS NOTES
Referral for Home P/T faxed to [facility] at [phone], connected with pt along with Dorota  (salvador)(and gave her the phone number to [facility] to call for scheduling ([phone]) or ([phone])

## 2023-09-07 ENCOUNTER — NURSE TRIAGE (OUTPATIENT)
Dept: FAMILY MEDICINE | Facility: CLINIC | Age: 58
End: 2023-09-07
Payer: COMMERCIAL

## 2023-09-07 NOTE — TELEPHONE ENCOUNTER
Covering provider. It appears that PCP was covering his in basket so this provider will defer to PCP's recommendation as below:       I agree that pt should be seen at [facility].  loren Penn MD  [facility] [place] [address]  Phone: [phone]  Fax: [phone]    9/7/2023  4:38 PM

## 2023-09-07 NOTE — TELEPHONE ENCOUNTER
Was notified by central scheduling supervisor to contact patient to triage for ongoing and worsening dizziness. RN attempted to call patient to triage for dizziness. Called  line but was told by  line that there is no [facility]  available. Called daughter of patient, Mckenna Cruz, speaks English, unable to reach patient, left a message on daughter's VM to call clinic. If patient calls back, please transfer to Triage RN. Thank you.     Routed to Pool.    Jez Martin, MSN, RN   [facility]

## 2023-09-07 NOTE — TELEPHONE ENCOUNTER
Called pt back in an attempt to relay provider message. Left message to call clinic back. If pt calls back, please relay provider message. Thank you.      Russell Vargas Cem Say, BSN RN  [facility]        I agree that pt should be seen at [facility].  [facility]

## 2023-09-07 NOTE — TELEPHONE ENCOUNTER
SEE IN OFFICE TODAY:   * You need to be examined today. Let me give you an appointment. Direct transferred call to [facility] for possible appointment today  * IF NO AVAILABLE APPOINTMENTS:  You need to be seen in the Urgent Care Center. Go to the one at [facility]. Gave patient's son address and hours of [facility] location if no appts in clinic. Go there today. A nearby Urgent Care Center is often a good source of care. Another choice is to go to the Emergency Department.    Message sent to PCP Dr Honeycutt for review / plan    Faye Baig RN  [facility]    Reason for Disposition   MODERATE dizziness (e.g., interferes with normal activities)  (Exception: Dizziness caused by heat exposure, sudden standing, or poor fluid intake.)    Additional Information   Negative: SEVERE difficulty breathing (e.g., struggling for each breath, speaks in single words)   Negative: Shock suspected (e.g., cold/pale/clammy skin, too weak to stand, low BP, rapid pulse)   Negative: Difficult to awaken or acting confused (e.g., disoriented, slurred speech)   Negative: Fainted, and still feels dizzy afterwards   Negative: Overdose (accidental or intentional) of medications   Negative: New neurologic deficit that is present now: * Weakness of the face, arm, or leg on one side of the body * Numbness of the face, arm, or leg on one side of the body * Loss of speech or garbled speech   Negative: Heart beating < 50 beats per minute OR > 140 beats per minute   Negative: Sounds like a life-threatening emergency to the triager   Negative: Chest pain   Negative: Rectal bleeding, bloody stool, or tarry-black stool   Negative: Vomiting is main symptom   Negative: Diarrhea is main symptom   Negative: Headache is main symptom   Negative: Heat exhaustion suspected (i.e., dehydration from heat exposure)   Negative: Patient states that they are having an anxiety or panic attack   Negative: Dizziness from low blood sugar  "(i.e., < 60 mg/dl or 3.5 mmol/l)   Negative: SEVERE dizziness (e.g., unable to stand, requires support to walk, feels like passing out now)   Negative: SEVERE headache or neck pain   Negative: Spinning or tilting sensation (vertigo) present now and one or more stroke risk factors (i.e., hypertension, diabetes mellitus, prior stroke/TIA, heart attack, age over 60) (Exception: Prior physician evaluation for this AND no different/worse than usual.)   Negative: Neurologic deficit that was brief (now gone), ANY of the following:* Weakness of the face, arm, or leg on one side of the body* Numbness of the face, arm, or leg on one side of the body* Loss of speech or garbled speech   Negative: Loss of vision or double vision  (Exception: Similar to previous migraines.)   Negative: Extra heartbeats, irregular heart beating, or heart is beating very fast (i.e., 'palpitations')   Negative: Difficulty breathing   Negative: Drinking very little and dehydration suspected (e.g., no urine > 12 hours, very dry mouth, very lightheaded)   Negative: Follows bleeding (e.g., stomach, rectum, vagina)  (Exception: Became dizzy from sight of small amount blood.)   Negative: Patient sounds very sick or weak to the triager   Negative: Lightheadedness (dizziness) present now, after 2 hours of rest and fluids   Negative: Spinning or tilting sensation (vertigo) present now   Negative: Fever > 103 F (39.4 C)   Negative: Fever > 100.0 F (37.8 C) and has diabetes mellitus or a weak immune system (e.g., HIV positive, cancer chemotherapy, organ transplant, splenectomy, chronic steroids)    Answer Assessment - Initial Assessment Questions  1. DESCRIPTION: \"Describe your dizziness.\"        Patient's son is interpreting  Room is spinning    2. LIGHTHEADED: \"Do you feel lightheaded?\" (e.g., somewhat faint, woozy, weak upon standing)      Happens anytime    3. VERTIGO: \"Do you feel like either you or the room is spinning or tilting?\" (i.e. vertigo)      " "Room is spinning    4. SEVERITY: \"How bad is it?\"  \"Do you feel like you are going to faint?\" \"Can you stand and walk?\"    - MILD: Feels slightly dizzy, but walking normally.    - MODERATE: Feels unsteady when walking, but not falling; interferes with normal activities (e.g., school, work).    - SEVERE: Unable to walk without falling, or requires assistance to walk without falling; feels like passing out now.         Moderate dizziness  has trouble walking    5. ONSET:  \"When did the dizziness begin?\"        1 week ago    6. AGGRAVATING FACTORS: \"Does anything make it worse?\" (e.g., standing, change in head position)        Standing up and turning head side to side    7. HEART RATE: \"Can you tell me your heart rate?\" \"How many beats in 15 seconds?\"  (Note: not all patients can do this)        Unable, son is having a hard time interpreting    8. CAUSE: \"What do you think is causing the dizziness?\"     Unknown   Drinks 4 - glasses of water a day  9. RECURRENT SYMPTOM: \"Have you had dizziness before?\" If Yes, ask: \"When was the last time?\" \"What happened that time?\"        20 years ago    10. OTHER SYMPTOMS: \"Do you have any other symptoms?\" (e.g., fever, chest pain, vomiting, diarrhea, bleeding)    Mild - severe headache off and off. No current HA        Urinates about every 2 hours, no S/S of dehydration    11. PREGNANCY: \"Is there any chance you are pregnant?\" \"When was your last menstrual period?\"        N/a    Protocols used: Dizziness-A-OH    "

## 2023-09-08 NOTE — TELEPHONE ENCOUNTER
Called pt and spoke to daughter (C2C on file). Relayed message, she stated pt does not want to go to [facility]. Pt already scheduled ppt with PCP on 9/26/23 and will wait. Pt stated she feels better today.    RN advised pt and daughter to go to urgent care if pt's symptoms worsen. Pt verbalized understanding.      Russell Vargas Cem Say, BSN RN  [facility]

## 2023-09-26 ENCOUNTER — OFFICE VISIT (OUTPATIENT)
Dept: FAMILY MEDICINE | Facility: CLINIC | Age: 58
End: 2023-09-26
Payer: COMMERCIAL

## 2023-09-26 VITALS
RESPIRATION RATE: 16 BRPM | DIASTOLIC BLOOD PRESSURE: 66 MMHG | BODY MASS INDEX: 26.87 KG/M2 | HEIGHT: 62 IN | SYSTOLIC BLOOD PRESSURE: 110 MMHG | WEIGHT: 146 LBS | OXYGEN SATURATION: 97 % | TEMPERATURE: 97.6 F | HEART RATE: 81 BPM

## 2023-09-26 DIAGNOSIS — R42 VERTIGO: Primary | ICD-10-CM

## 2023-09-26 DIAGNOSIS — G89.29 CHRONIC BILATERAL LOW BACK PAIN WITH BILATERAL SCIATICA: ICD-10-CM

## 2023-09-26 DIAGNOSIS — D32.9 MENINGIOMA (H): ICD-10-CM

## 2023-09-26 DIAGNOSIS — M54.42 CHRONIC BILATERAL LOW BACK PAIN WITH BILATERAL SCIATICA: ICD-10-CM

## 2023-09-26 DIAGNOSIS — M54.41 CHRONIC BILATERAL LOW BACK PAIN WITH BILATERAL SCIATICA: ICD-10-CM

## 2023-09-26 PROCEDURE — 99214 OFFICE O/P EST MOD 30 MIN: CPT | Performed by: FAMILY MEDICINE

## 2023-09-26 RX ORDER — MECLIZINE HYDROCHLORIDE 25 MG/1
25 TABLET ORAL 3 TIMES DAILY PRN
Qty: 40 TABLET | Refills: 11 | Status: SHIPPED | OUTPATIENT
Start: 2023-09-26

## 2023-09-26 NOTE — PROGRESS NOTES
"  Assessment & Plan     Vertigo    This has been longstanding, but has been worse in the past 2 weeks.    She will use meclizine as needed.    - meclizine (ANTIVERT) 25 MG tablet  Dispense: 40 tablet; Refill: 11    Chronic bilateral low back pain with bilateral sciatica    Stable.    Continue gabapentin.      Meningioma (H)    Stable.        She declined flu vaccine.    She has physical scheduled February 27, 2024.    I called [facility], but received busy signals.        37 minutes spent by me on the date of the encounter doing chart review, review of test results, and patient visit        BMI:   Estimated body mass index is 26.7 kg/m  as calculated from the following:    Height as of this encounter: 1.575 m (5' 2\").    Weight as of this encounter: 66.2 kg (146 lb).           Campbell Honeycutt MD  [facility] DEREJE Hallman is a 58 year old, presenting for the following health issues:  Dizziness (Light headed for 2 weeks now. Headaches are getting better, symptoms are still present, comes and go . She stated that this is the longest she had headaches, usually it is 1 to 2 times a year. Cannot lie down. )      9/26/2023    11:28 AM   Additional Questions   Roomed by Jane Martin MA   Accompanied by SELF       History of Present Illness       Headaches:   Since the patient's last clinic visit, headaches are: improved  The patient is getting headaches:  Constant  She is able to do normal daily activities when she has a migraine.  The patient is taking the following rescue/relief medications:  Other   Patient states \"I get total relief\" from the rescue/relief medications.   The patient is taking the following medications to prevent migraines:  Other  In the past 4 weeks, the patient has gone to an Urgent Care or Emergency Room 0 times times due to headaches.    She eats 0-1 servings of fruits and vegetables daily.              She has spinning sensation if she lies down or changes " "position.    She takes Tylenol once every other day.  She has most of the 180 tablets that were dispensed March 13, 2023.    MRI demonstrated meningioma in 2020 that was stable compared to 2018.    She has most of the 60 gabapentin that were dispensed January 10, 2023.    She had spine appointment July 31, 2023.    Current Outpatient Medications   Medication Sig Dispense Refill    acetaminophen (TYLENOL) 500 MG tablet Take 2 tablets (1,000 mg) by mouth every 8 hours as needed for mild pain 1000 tablet 0    acetaminophen (TYLENOL) 500 MG tablet [ACETAMINOPHEN (TYLENOL) 500 MG TABLET] Take 1 tablet (500 mg total) by mouth every 6 (six) hours as needed for pain. 100 tablet 11    gabapentin (NEURONTIN) 100 MG capsule Take 1 capsule (100 mg) by mouth 2 times daily 60 capsule 11    hydrocortisone (CORTAID) 1 % external cream APPLY THIN LAYER TO BOTH ARMS TWICE A DAY      hydrocortisone-aloe 1 % CREA [HYDROCORTISONE WITH ALOE 1 % CREA CREAM] Apply thin layer to affected area twice a day 56 g 3    ibuprofen (ADVIL,MOTRIN) 400 MG tablet [IBUPROFEN (ADVIL,MOTRIN) 400 MG TABLET] Take 1 tablet (400 mg total) by mouth daily as needed for pain. 30 tablet 11    meclizine (ANTIVERT) 25 MG tablet Take 1 tablet (25 mg) by mouth 3 times daily as needed for dizziness 40 tablet 11    polyethylene glycol (MIRALAX) 17 GM/Dose powder Take 17 g (1 capful) by mouth daily 507 g 11    triamcinolone (KENALOG) 0.1 % external cream Apply 1 gm to arms 2 times daily 80 g 3         Review of Systems   No fever or cough.      Objective    /66   Temp 97.6  F (36.4  C) (Temporal)   Resp 16   Ht 1.575 m (5' 2\")   Wt 66.2 kg (146 lb)   LMP 05/01/2021 (Within Weeks)   BMI 26.70 kg/m    Body mass index is 26.7 kg/m .  Physical Exam   Heart normal  Lungs normal  Eyes without conjunctival pallor                      "

## 2023-10-12 ENCOUNTER — PATIENT OUTREACH (OUTPATIENT)
Dept: CARE COORDINATION | Facility: CLINIC | Age: 58
End: 2023-10-12
Payer: COMMERCIAL

## 2023-10-12 NOTE — PROGRESS NOTES
Clinic Care Coordination Contact  Program:  [facility]: [place]   Renewal: [facility]ARE  Date Applied:     HUMBERTO Outreach:   10/12/23: 1st outreach attempt. Left a message on voicemail with call back information and requested return call.  Plan: CTA will call again within 2 weeks.  Alana Pérez  Care   [facility]  Clinic Care Coordination  [phone]      Health Insurance:      Referral/Screening:

## 2024-01-05 ENCOUNTER — OFFICE VISIT (OUTPATIENT)
Dept: INTERNAL MEDICINE | Facility: CLINIC | Age: 59
End: 2024-01-05
Payer: COMMERCIAL

## 2024-01-05 ENCOUNTER — ANCILLARY PROCEDURE (OUTPATIENT)
Dept: GENERAL RADIOLOGY | Facility: CLINIC | Age: 59
End: 2024-01-05
Attending: INTERNAL MEDICINE
Payer: COMMERCIAL

## 2024-01-05 VITALS
SYSTOLIC BLOOD PRESSURE: 106 MMHG | HEIGHT: 62 IN | HEART RATE: 82 BPM | WEIGHT: 142.2 LBS | DIASTOLIC BLOOD PRESSURE: 72 MMHG | OXYGEN SATURATION: 97 % | RESPIRATION RATE: 12 BRPM | BODY MASS INDEX: 26.17 KG/M2 | TEMPERATURE: 97 F

## 2024-01-05 DIAGNOSIS — J06.9 UPPER RESPIRATORY TRACT INFECTION, UNSPECIFIED TYPE: ICD-10-CM

## 2024-01-05 DIAGNOSIS — J06.9 UPPER RESPIRATORY TRACT INFECTION, UNSPECIFIED TYPE: Primary | ICD-10-CM

## 2024-01-05 PROCEDURE — 71046 X-RAY EXAM CHEST 2 VIEWS: CPT | Mod: TC | Performed by: RADIOLOGY

## 2024-01-05 PROCEDURE — 99213 OFFICE O/P EST LOW 20 MIN: CPT | Performed by: INTERNAL MEDICINE

## 2024-01-05 RX ORDER — CODEINE PHOSPHATE AND GUAIFENESIN 10; 100 MG/5ML; MG/5ML
1-2 SOLUTION ORAL EVERY 4 HOURS PRN
Qty: 237 ML | Refills: 1 | Status: SHIPPED | OUTPATIENT
Start: 2024-01-05

## 2024-01-05 ASSESSMENT — ENCOUNTER SYMPTOMS
HEADACHES: 1
COUGH: 1

## 2024-01-05 NOTE — COMMUNITY RESOURCES LIST (ENGLISH)
01/05/2024   [facility]  N/A  For questions about this resource list or additional care needs, please contact your primary care clinic or care manager.  Phone: [phone]   Email: N/A   Address: [address] [place] [id number]   Hours: N/A        Food and Nutrition       Food pantry  1  [facility] Distance: 0.32 miles      Pickup   [address] [address] [place] [address]  Language: English, Hmong, Indonesian, Nigerian, Belarusian  Hours: Mon 3:00 PM - 5:00 PM , Tue - Thu 10:00 AM - 12:00 PM  Fees: Free   Phone: (208) 326-6554 Email: info@[facility] Website: http://www.[facility]     2  [facility] [employer] Distance: 0.48 miles      Pickup   [address] [place] [address]  Language: English, Nigerian  Hours: Sat 9:30 AM - 11:30 AM  Fees: Free   Phone: (180) 299-6566 Website: http://www.[facility]     SNAP application assistance  3  [facility] [place] Distance: 1.04 miles      Phone/Virtual   [address] [place] [address]  Language: English  Hours: Mon - Thu 9:00 AM - 4:00 PM , Fri 9:00 AM - 2:00 PM , Sun 10:00 AM - 12:00 PM  Fees: Free   Phone: (797) 225-4360 Email: [email or web address].org Website: http://[facility].org/[facility]/     4  [employer] [facility] Distance: 1.69 miles      In-Person   [address] [place], [place] [id number]  Language: English, Nigerian  Hours: Mon - Fri 8:30 AM - 5:00 PM  Fees: Free   Phone: (636) 995-8298 Email: info@[employer].org Website: http://www.cl[employer].org     Soup kitchen or free meals  5  [facility] Distance: 0.68 miles      Pickup   [address] [place] [address]  Language: English  Hours: Wed 2:00 PM - 4:00 PM , Fri 2:00 PM - 4:00 PM  Fees: Free   Phone: (432) 800-1747 Email: Pierre@[facility]. Website:  https://www.[facility]/facilities/dyuzeyaqd-onxqc-hlarvdjxy-[place]     6  [facility] Distance: 1.04 miles      [facility]   [address] [place] [address]  Language: English  Hours: Mon - Fri 11:45 AM - 12:45 PM  Fees: Free   Phone: (862) 277-6087 Email: rose mary@[facility].org Website: http://[facility].org/[facility]/rl-etpi-mejpt-jesus/          Important Numbers & Websites       Emergency Services   [address]  [place] [address]  Poison Control   (780) 864-9905  Suicide Prevention Lifeline   (811) 204-3222 (TALK)  Child Abuse Hotline   (333) 875-6422 (4-A-Child)  Sexual Assault Hotline   (297) 616-8184 (HOPE)  National Runaway Safeline   (940) 801-3525 (RUNAWAY)  All-Options Talkline   (540) 252-9290  Substance Abuse Referral   (916) 717-4280 (HELP)

## 2024-01-05 NOTE — PROGRESS NOTES
"  Assessment & Plan     Upper respiratory tract infection, unspecified type  Clinically no signs of complications . Appears to be a viral resp infection and the worst symptoms have resolved   Because lungs seem to have some coarse sounds and she did have a h/o nodules in the past , will get chest xray . Otherwise reassured , symptomatic treatment recommended   - XR Chest 2 Views; Future  - guaiFENesin-codeine (ROBITUSSIN AC) 100-10 MG/5ML solution; Take 5-10 mLs by mouth every 4 hours as needed for cough             BMI:   Estimated body mass index is 26.01 kg/m  as calculated from the following:    Height as of this encounter: 1.575 m (5' 2\").    Weight as of this encounter: 64.5 kg (142 lb 3.2 oz).           Lucille Tolliver MD  [facility]    Lata Hallman is a 59 year old, presenting for the following health issues:  Been sick for 7 days feels a little better   Did not check for covid   Phlegm has been clear   No ear pain , had some mouth sores at first , no fever , initially cough kept her up but now sleeping at night     Headache, URI, and Cough (The patient complains of a cough for the last week.)      1/5/2024     1:09 PM   Additional Questions   Roomed by Tonya LEE CMA       Headache     URI  Associated symptoms include coughing and headaches.   Cough  Associated symptoms include headaches.   History of Present Illness       Reason for visit:  URI  Symptom onset:  1-3 days ago  Symptoms include:  Cough, chills  Symptom intensity:  Mild  Symptom progression:  Improving  Had these symptoms before:  No    She eats 0-1 servings of fruits and vegetables daily.She consumes 0 sweetened beverage(s) daily.She exercises with enough effort to increase her heart rate 30 to 60 minutes per day.  She exercises with enough effort to increase her heart rate 6 days per week.   She is taking medications regularly.                 Review of Systems   Respiratory:  Positive for cough.  " "  Neurological:  Positive for headaches.            Objective    /72 (BP Location: Left arm, Patient Position: Sitting, Cuff Size: Adult Regular)   Pulse 82   Temp 97  F (36.1  C) (Tympanic)   Resp 12   Ht 1.575 m (5' 2\")   Wt 64.5 kg (142 lb 3.2 oz)   LMP 05/01/2021 (Within Weeks)   SpO2 97%   Breastfeeding No   BMI 26.01 kg/m    Body mass index is 26.01 kg/m .  Physical Exam   GENERAL: healthy, alert and no distress  NECK: no adenopathy, no asymmetry, masses, or scars and thyroid normal to palpation  RESP: lungs clear to auscultation - no wheezes, no coarse BS but air entry is a little low   CV: regular rate and rhythm, normal S1 S2, no S3 or S4, no murmur, click or rub, no peripheral edema and peripheral pulses strong  MS: no gross musculoskeletal defects noted, no edema                      "

## 2024-04-04 ENCOUNTER — NURSE TRIAGE (OUTPATIENT)
Dept: NURSING | Facility: CLINIC | Age: 59
End: 2024-04-04
Payer: COMMERCIAL

## 2024-04-04 NOTE — TELEPHONE ENCOUNTER
Telephone call:    Caller not w/ patient, writer unable to triage.  Writer transferred called to scheduling & advised to call back w/ patient.    Jessie Perkins RN on 4/4/2024 at 12:35 PM     Reason for Disposition   Caller is not with the adult (patient) AND patient has probable NON-URGENT symptoms    Protocols used: Information Only Call - No Triage-A-OH

## 2024-04-23 NOTE — TELEPHONE ENCOUNTER
Left message x 3. Please review message thread below and advise the patient as indicated. Please schedule if necessary or indicated in message thread. Letter sent.      Resident

## 2024-05-24 ENCOUNTER — OFFICE VISIT (OUTPATIENT)
Dept: FAMILY MEDICINE | Facility: CLINIC | Age: 59
End: 2024-05-24
Payer: COMMERCIAL

## 2024-05-24 VITALS
WEIGHT: 148.12 LBS | TEMPERATURE: 98.2 F | HEIGHT: 62 IN | HEART RATE: 55 BPM | OXYGEN SATURATION: 95 % | RESPIRATION RATE: 20 BRPM | SYSTOLIC BLOOD PRESSURE: 127 MMHG | BODY MASS INDEX: 27.26 KG/M2 | DIASTOLIC BLOOD PRESSURE: 86 MMHG

## 2024-05-24 DIAGNOSIS — M54.41 CHRONIC BILATERAL LOW BACK PAIN WITH BILATERAL SCIATICA: Primary | ICD-10-CM

## 2024-05-24 DIAGNOSIS — G89.29 CHRONIC BILATERAL LOW BACK PAIN WITH BILATERAL SCIATICA: Primary | ICD-10-CM

## 2024-05-24 DIAGNOSIS — D32.9 MENINGIOMA (H): ICD-10-CM

## 2024-05-24 DIAGNOSIS — M54.42 CHRONIC BILATERAL LOW BACK PAIN WITH BILATERAL SCIATICA: Primary | ICD-10-CM

## 2024-05-24 PROCEDURE — 99207 PR NO CHARGE LOS: CPT | Performed by: FAMILY MEDICINE

## 2024-05-24 PROCEDURE — 99213 OFFICE O/P EST LOW 20 MIN: CPT | Performed by: FAMILY MEDICINE

## 2024-05-24 RX ORDER — GABAPENTIN 100 MG/1
100 CAPSULE ORAL 2 TIMES DAILY
Qty: 60 CAPSULE | Refills: 11 | Status: SHIPPED | OUTPATIENT
Start: 2024-05-24

## 2024-05-24 NOTE — PROGRESS NOTES
"  Assessment & Plan     Chronic bilateral low back pain with bilateral sciatica    Gabapentin has helped previously: resume.    Return for Px in 3 months.      - gabapentin (NEURONTIN) 100 MG capsule  Dispense: 60 capsule; Refill: 11    Meningioma (H)    Stable.      The longitudinal plan of care for the diagnosis(es)/condition(s) as documented were addressed during this visit. Due to the added complexity in care, I will continue to support  in the subsequent management and with ongoing continuity of care.        BMI  Estimated body mass index is 26.91 kg/m  as calculated from the following:    Height as of this encounter: 1.58 m (5' 2.21\").    Weight as of this encounter: 67.2 kg (148 lb 1.9 oz).             Subjective   Law is a 59 year old, presenting for the following health issues:  Generalized Body Aches (Body pain from a fall accident (15 years ago per pt), start having body aches and tingling a week ago. )      5/24/2024     8:22 AM   Additional Questions   Roomed by kathy dugan MA   Accompanied by self     History of Present Illness       Back Pain:  She presents for follow up of back pain. Patient's back pain is a new problem.    Original cause of back pain: a fall  First noticed back pain: 1-4 weeks ago  Patient feels back pain: dailyLocation of back pain:  Right lower back, left lower back, right middle of back, left middle of back, right upper back and left upper back  Description of back pain: fullness and other  Back pain spreads: right buttocks, left buttocks, right thigh, left thigh, right foot, left foot, right shoulder and left shoulder    Since patient first noticed back pain, pain is: always present, but gets better and worse  Does back pain interfere with her job:  Yes  On a scale of 1-10 (10 being the worst), patient describes pain as:  6  What makes back pain worse: sitting and standing   Other healthcare providers patient is seeing for back pain: None    She eats 0-1 servings of fruits and " "vegetables daily.She consumes 1 sweetened beverage(s) daily.She exercises with enough effort to increase her heart rate 20 to 29 minutes per day.  She exercises with enough effort to increase her heart rate 3 or less days per week.   She is taking medications regularly.             She has not been taking any medication.    No show Px in February.    Lumbar MRI May 2023: spinal stenosis, degeneration.    Current Outpatient Medications   Medication Sig Dispense Refill    acetaminophen (TYLENOL) 500 MG tablet [ACETAMINOPHEN (TYLENOL) 500 MG TABLET] Take 1 tablet (500 mg total) by mouth every 6 (six) hours as needed for pain. 100 tablet 11    gabapentin (NEURONTIN) 100 MG capsule Take 1 capsule (100 mg) by mouth 2 times daily 60 capsule 11    guaiFENesin-codeine (ROBITUSSIN AC) 100-10 MG/5ML solution Take 5-10 mLs by mouth every 4 hours as needed for cough 237 mL 1    hydrocortisone-aloe 1 % CREA [HYDROCORTISONE WITH ALOE 1 % CREA CREAM] Apply thin layer to affected area twice a day 56 g 3    ibuprofen (ADVIL,MOTRIN) 400 MG tablet [IBUPROFEN (ADVIL,MOTRIN) 400 MG TABLET] Take 1 tablet (400 mg total) by mouth daily as needed for pain. 30 tablet 11    meclizine (ANTIVERT) 25 MG tablet Take 1 tablet (25 mg) by mouth 3 times daily as needed for dizziness 40 tablet 11    polyethylene glycol (MIRALAX) 17 GM/Dose powder Take 17 g (1 capful) by mouth daily 507 g 11    triamcinolone (KENALOG) 0.1 % external cream Apply 1 gm to arms 2 times daily 80 g 3             Objective    /86   Pulse 55   Temp 98.2  F (36.8  C) (Oral)   Resp 20   Ht 1.58 m (5' 2.21\")   Wt 67.2 kg (148 lb 1.9 oz)   LMP 05/01/2021 (Within Weeks)   SpO2 95%   BMI 26.91 kg/m    Body mass index is 26.91 kg/m .  Physical Exam     Heart normal  Lungs normal  Bilateral straight leg raise positive to posterior thigh          Signed Electronically by: Campbell Honeycutt MD    "

## 2024-06-10 ENCOUNTER — HOSPITAL ENCOUNTER (EMERGENCY)
Facility: HOSPITAL | Age: 59
Discharge: HOME | End: 2024-06-10
Admitting: PHYSICIAN ASSISTANT
Payer: COMMERCIAL

## 2024-06-10 VITALS
HEART RATE: 74 BPM | WEIGHT: 150 LBS | HEIGHT: 62 IN | BODY MASS INDEX: 27.6 KG/M2 | OXYGEN SATURATION: 95 % | DIASTOLIC BLOOD PRESSURE: 88 MMHG | RESPIRATION RATE: 14 BRPM | TEMPERATURE: 97.9 F | SYSTOLIC BLOOD PRESSURE: 135 MMHG

## 2024-06-10 DIAGNOSIS — N30.01 ACUTE CYSTITIS WITH HEMATURIA: ICD-10-CM

## 2024-06-10 LAB
ALBUMIN UR-MCNC: NEGATIVE MG/DL
APPEARANCE UR: CLEAR
BACTERIA #/AREA URNS HPF: ABNORMAL /HPF
BILIRUB UR QL STRIP: NEGATIVE
COLOR UR AUTO: COLORLESS
GLUCOSE UR STRIP-MCNC: NEGATIVE MG/DL
HGB UR QL STRIP: ABNORMAL
KETONES UR STRIP-MCNC: NEGATIVE MG/DL
LEUKOCYTE ESTERASE UR QL STRIP: ABNORMAL
NITRATE UR QL: NEGATIVE
PH UR STRIP: 7 [PH] (ref 5–7)
RBC URINE: 1 /HPF
SP GR UR STRIP: 1 (ref 1–1.03)
SQUAMOUS EPITHELIAL: 1 /HPF
UROBILINOGEN UR STRIP-MCNC: <2 MG/DL
WBC URINE: 68 /HPF

## 2024-06-10 PROCEDURE — 99284 EMERGENCY DEPT VISIT MOD MDM: CPT

## 2024-06-10 PROCEDURE — 250N000011 HC RX IP 250 OP 636: Performed by: PHYSICIAN ASSISTANT

## 2024-06-10 PROCEDURE — 81003 URINALYSIS AUTO W/O SCOPE: CPT | Performed by: PHYSICIAN ASSISTANT

## 2024-06-10 PROCEDURE — 87086 URINE CULTURE/COLONY COUNT: CPT | Performed by: PHYSICIAN ASSISTANT

## 2024-06-10 RX ORDER — ONDANSETRON 4 MG/1
4 TABLET, ORALLY DISINTEGRATING ORAL ONCE
Status: COMPLETED | OUTPATIENT
Start: 2024-06-10 | End: 2024-06-10

## 2024-06-10 RX ORDER — CEFDINIR 300 MG/1
300 CAPSULE ORAL 2 TIMES DAILY
Qty: 14 CAPSULE | Refills: 0 | Status: SHIPPED | OUTPATIENT
Start: 2024-06-10 | End: 2024-06-17

## 2024-06-10 RX ORDER — ONDANSETRON 4 MG/1
4 TABLET, ORALLY DISINTEGRATING ORAL EVERY 8 HOURS PRN
Qty: 15 TABLET | Refills: 0 | Status: SHIPPED | OUTPATIENT
Start: 2024-06-10 | End: 2024-06-15

## 2024-06-10 RX ADMIN — ONDANSETRON 4 MG: 4 TABLET, ORALLY DISINTEGRATING ORAL at 10:43

## 2024-06-10 ASSESSMENT — ACTIVITIES OF DAILY LIVING (ADL): ADLS_ACUITY_SCORE: 35

## 2024-06-10 NOTE — ED TRIAGE NOTES
The pt states her mom has had urinary frequency, burning an discomfort the past 2 days. Pt has had a UTI in the past and this feels like a UTI. Suprapubic discomfort.

## 2024-06-10 NOTE — ED PROVIDER NOTES
EMERGENCY DEPARTMENT ENCOUNTER      NAME: Law Cruz  AGE: 59 year old female  YOB: 1965  MRN: [id number]  EVALUATION DATE & TIME: No admission date for patient encounter.    PCP: Campbell Honeycutt    ED PROVIDER: Walter Peck PA-C      Chief Complaint   Patient presents with    UTI         FINAL IMPRESSION:  1. Acute cystitis with hematuria          ED COURSE & MEDICAL DECISION MAKING:    Pertinent Labs & Imaging studies reviewed. (See chart for details)  10:27 AM I met the patient and performed my initial interview and exam.   11:03 AM Patient updated, plan for discharge.     59 year old female presents to the Emergency Department for evaluation of possible urinary tract infection.     ED Course as of 06/10/24 1103   Mon Zac 10, 2024   1028 Patient is a 59-year-old female, presents emergency department for evaluation of possible urinary tract infection.  On arrival here, not febrile, tachycardic or tachypneic.  Patient with past medical history of chronic low back pain, previous history of meningioma.  Patient complaining of urinary frequency, burning, and discomfort for the last 2 days.  Patient reports feeling very similar to previous episodes of urinary tract infection.  Patient does endorse a little bit of nausea.  Mild suprapubic abdominal pain, over no rebound or guarding.  No CVA tenderness.  Afebrile, not tachycardic or tachypneic.  Does not take any medications.  Denies any changes in bowel.  No vomiting episodes, however does feel nauseous.  Broad differential considered here, however given lack of abdominal pain, unlikely to be appendicitis, cholecystitis, choledocholithiasis, pancreatitis.  Given suprapubic discomfort, will plan for urinalysis.  Overall, patient's presentation does not seem consistent with pyelonephritis.  No indication for further labs or imaging at this point.  Pending urinalysis here, patient be given some Zofran for symptom management.   1058 Urinalysis here in  the emergency department does show a little bit of blood, some leukocyte esterase as well as some bacteria.  Significant amount of white blood cells.  Consistent with urinary tract infection given patient's symptoms here, will place on antibiotics and recommend outpatient follow-up.       Medical Decision Making  Obtained supplemental history:Supplemental history obtained?: Family Member/Significant Other  Reviewed external records: External records reviewed?: Outpatient Record: Outpatient office visit on 05/24/2024, outpatient office visit on 01//2024, outpatient ED visit on 07/28/2023  Care impacted by chronic illness:Chronic Pain  Care significantly affected by social determinants of health:N/A  Did you consider but not order tests?: Work up considered but not performed and documented in chart, if applicable  Did you interpret images independently?: Independent interpretation of ECG and images noted in documentation, when applicable.  Consultation discussion with other provider:Did you involve another provider (consultant, , pharmacy, etc.)?: No  Discharge. I prescribed additional prescription strength medication(s) as charted. N/A.         At the conclusion of the encounter I discussed the results of all of the tests and the disposition. The questions were answered. The patient or family acknowledged understanding and was agreeable with the care plan.       0 minutes of critical care time     MEDICATIONS GIVEN IN THE EMERGENCY:  Medications   ondansetron (ZOFRAN ODT) ODT tab 4 mg (4 mg Oral $Given 6/10/24 1043)       NEW PRESCRIPTIONS STARTED AT TODAY'S ER VISIT  New Prescriptions    CEFDINIR (OMNICEF) 300 MG CAPSULE    Take 1 capsule (300 mg) by mouth 2 times daily for 7 days    ONDANSETRON (ZOFRAN ODT) 4 MG ODT TAB    Take 1 tablet (4 mg) by mouth every 8 hours as needed          =================================================================    HPI    Patient information was obtained from: Patient, family      Use of : Yany Arrieta is a 59 year old female with a pertinent history of anemia, backache, memory loss, meningioma, chronic back pain who presents to this ED for evaluation of pain with urination.  Patient reports symptoms for last couple of days.  Does feel nauseous, no vomiting.  Has not take anything at home for pain.  Feels similar to her previous episodes urinary tract infection.  No history of kidney stone.  Denies other acute complaints.    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 pylori infection 6/28/2016    Irregular heart rate     Perforated appendix 03/11/2014    In [place]    SOB (shortness of breath)        PAST SURGICAL HISTORY:  No past surgical history on file.        CURRENT MEDICATIONS:    cefdinir (OMNICEF) 300 MG capsule  ondansetron (ZOFRAN ODT) 4 MG ODT tab  acetaminophen (TYLENOL) 500 MG tablet  gabapentin (NEURONTIN) 100 MG capsule  guaiFENesin-codeine (ROBITUSSIN AC) 100-10 MG/5ML solution  hydrocortisone-aloe 1 % CREA  ibuprofen (ADVIL,MOTRIN) 400 MG tablet  meclizine (ANTIVERT) 25 MG tablet  polyethylene glycol (MIRALAX) 17 GM/Dose powder  triamcinolone (KENALOG) 0.1 % external cream         ALLERGIES:  Allergies   Allergen Reactions    No Known Drug Allergy Unknown       FAMILY HISTORY:  Family History   Problem Relation Age of Onset    Breast Cancer No family hx of     Ovarian Cancer No family hx of        SOCIAL HISTORY:   Social History     Socioeconomic History    Marital status:    Tobacco Use    Smoking status: Never     Passive exposure: Never    Smokeless tobacco: Current     Types: Chew    Tobacco comments:     Betel Nut w/ tobacco   Vaping Use    Vaping status: Never Used   Substance and Sexual Activity    Alcohol use: No    Drug use: No    Sexual activity: Yes     Partners: Male     Social Determinants of Health     Financial  "Resource Strain: Low Risk  (1/5/2024)    Financial Resource Strain     Within the past 12 months, have you or your family members you live with been unable to get utilities (heat, electricity) when it was really needed?: No   Food Insecurity: High Risk (1/5/2024)    Food Insecurity     Within the past 12 months, did you worry that your food would run out before you got money to buy more?: Yes     Within the past 12 months, did the food you bought just not last and you didn t have money to get more?: Yes   Transportation Needs: Low Risk  (1/5/2024)    Transportation Needs     Within the past 12 months, has lack of transportation kept you from medical appointments, getting your medicines, non-medical meetings or appointments, work, or from getting things that you need?: No   Social Connections: Unknown (11/2/2022)    Social Connection and Isolation Panel [NHANES]     Frequency of Communication with Friends and Family: Three times a week     Frequency of Social Gatherings with Friends and Family: Three times a week   Interpersonal Safety: Low Risk  (5/24/2024)    Interpersonal Safety     Do you feel physically and emotionally safe where you currently live?: Yes     Within the past 12 months, have you been hit, slapped, kicked or otherwise physically hurt by someone?: No     Within the past 12 months, have you been humiliated or emotionally abused in other ways by your partner or ex-partner?: No   Housing Stability: Low Risk  (1/5/2024)    Housing Stability     Do you have housing? : Yes     Are you worried about losing your housing?: No       VITALS:  BP (!) 162/89   Pulse 86   Temp 97.9  F (36.6  C) (Temporal)   Resp 12   Ht 1.575 m (5' 2\")   Wt 68 kg (150 lb)   LMP 05/01/2021 (Within Weeks)   SpO2 96%   BMI 27.44 kg/m      PHYSICAL EXAM    Physical Exam  Vitals and nursing note reviewed.   Constitutional:       General: She is not in acute distress.     Appearance: Normal appearance. She is normal weight. She " is not toxic-appearing or diaphoretic.   HENT:      Right Ear: External ear normal.      Left Ear: External ear normal.   Eyes:      Conjunctiva/sclera: Conjunctivae normal.   Cardiovascular:      Rate and Rhythm: Normal rate and regular rhythm.      Pulses: Normal pulses.   Pulmonary:      Effort: Pulmonary effort is normal.   Abdominal:      General: Abdomen is flat.      Palpations: Abdomen is soft.      Tenderness: There is abdominal tenderness. There is no right CVA tenderness, left CVA tenderness, guarding or rebound.      Comments: Diffuse suprapubic abdominal tenderness, however no rebound or guarding.  No significant CVA tenderness.   Musculoskeletal:      Right lower leg: No edema.      Left lower leg: No edema.   Skin:     Findings: No erythema or rash.   Neurological:      Mental Status: She is alert. Mental status is at baseline.           LAB:  All pertinent labs reviewed and interpreted.  Labs Ordered and Resulted from Time of ED Arrival to Time of ED Departure   ROUTINE UA WITH MICROSCOPIC REFLEX TO CULTURE - Abnormal       Result Value    Color Urine Colorless      Appearance Urine Clear      Glucose Urine Negative      Bilirubin Urine Negative      Ketones Urine Negative      Specific Gravity Urine 1.003      Blood Urine 0.2 mg/dL (*)     pH Urine 7.0      Protein Albumin Urine Negative      Urobilinogen Urine <2.0      Nitrite Urine Negative      Leukocyte Esterase Urine 500 Mony/uL (*)     Bacteria Urine Few (*)     RBC Urine 1      WBC Urine 68 (*)     Squamous Epithelials Urine 1     URINE CULTURE       RADIOLOGY:  Reviewed all pertinent imaging. Please see official radiology report.  No orders to display     PROCEDURES:   None.     Walter Peck PA-C  Emergency Medicine  [facility] EMERGENCY DEPARTMENT  [employer] [facility] [facility] [id number] [phone]  Dept: [phone]       Walter Peck PA-C  06/10/24 [id number]

## 2024-06-10 NOTE — DISCHARGE INSTRUCTIONS
You were seen here in the emergency department for evaluation of dysuria, pain with urination.  Your urine sample here does appear consistent with urinary tract infection.  I have sent a prescription for antibiotics to your pharmacy.  Ibuprofen or Tylenol at home, dose recommendations included below.    For pain or fever you may use:  -Tylenol 650 mg every 6 hours.  Max 4000 mg in 24 hours  Do not use thismedication with alcohol as it can cause liver problems.  -Ibuprofen 600 mg every 6 hours.  Max 3500 mg in 24 hours  Do not take this medication if you have a history of a GI bleed or have kidney problems.  You may use both of these medications at the same time or you can alternate them every 3 hours.  For example, Tylenol at 6 AM, ibuprofen at 9 AM, Tylenol at noon, etc.

## 2024-06-11 ENCOUNTER — PATIENT OUTREACH (OUTPATIENT)
Dept: FAMILY MEDICINE | Facility: CLINIC | Age: 59
End: 2024-06-11
Payer: COMMERCIAL

## 2024-06-11 NOTE — TELEPHONE ENCOUNTER
"Writer attempt #1 to call patient with the help of a \"Yany/Jose D\" regarding recent ED/HOSP follow-up visit. No answer, left non-detailed VM with call back number.    If patient returns call back, please transfer call to an available RN to complete Post Discharge Assessment questionnaires. Thanks.    Jeff Nicolas, MICHAELN, RN   [facility]    "

## 2024-06-12 ENCOUNTER — TELEPHONE (OUTPATIENT)
Dept: NURSING | Facility: CLINIC | Age: 59
End: 2024-06-12
Payer: COMMERCIAL

## 2024-06-12 LAB
BACTERIA UR CULT: ABNORMAL
BACTERIA UR CULT: ABNORMAL

## 2024-06-12 NOTE — TELEPHONE ENCOUNTER
"Writer attempt #2 to call patient with the help of a \"Yany/Jose D\" regarding recent ED/HOSP follow-up visit. No answer, left non-detailed VM with call back number.    If patient returns call back, please transfer call to an available RN to complete Post Discharge Assessment questionnaires. Thanks.    Second attempt, closing encounter.    MICHAEL ContrerasN, RN   [facility]    "

## 2024-06-12 NOTE — TELEPHONE ENCOUNTER
[facility]    Reason for call: Lab Result Notification     Lab Result (including Rx patient on, if applicable).  If culture, copy of lab report at bottom.  Lab Result: See below    ED Rx: cefdinir (OMNICEF) 300 MG capsule - Take 1 capsule (300 mg) by mouth 2 times daily for 7 days (SUSCEPTIBLE)    Creatinine Level (mg/dl)   Creatinine   Date Value Ref Range Status   01/21/2020 0.75 0.60 - 1.10 mg/dL Final    Creatinine clearance (ml/min), if applicable    Creatinine clearance cannot be calculated (Patient's most recent lab result is older than the maximum 365 days allowed.)     Patient's current Symptoms:   Called patient via an . Unable to assess. Unable to leave a message. Letter sent.     RN Recommendations/Instructions per [place] ED lab result protocol:   [facility] ED lab result protocol utilized: Urine culture    YNES WHITING RN

## 2024-06-28 ENCOUNTER — HOSPITAL ENCOUNTER (EMERGENCY)
Facility: HOSPITAL | Age: 59
Discharge: HOME | End: 2024-06-28
Attending: FAMILY MEDICINE | Admitting: FAMILY MEDICINE
Payer: COMMERCIAL

## 2024-06-28 ENCOUNTER — APPOINTMENT (OUTPATIENT)
Dept: CT IMAGING | Facility: HOSPITAL | Age: 59
End: 2024-06-28
Attending: FAMILY MEDICINE
Payer: COMMERCIAL

## 2024-06-28 VITALS
DIASTOLIC BLOOD PRESSURE: 76 MMHG | SYSTOLIC BLOOD PRESSURE: 126 MMHG | RESPIRATION RATE: 18 BRPM | BODY MASS INDEX: 27.6 KG/M2 | WEIGHT: 150 LBS | HEART RATE: 66 BPM | TEMPERATURE: 97.6 F | HEIGHT: 62 IN | OXYGEN SATURATION: 98 %

## 2024-06-28 DIAGNOSIS — N39.0 RECURRENT UTI: ICD-10-CM

## 2024-06-28 LAB
ALBUMIN UR-MCNC: NEGATIVE MG/DL
ANION GAP SERPL CALCULATED.3IONS-SCNC: 12 MMOL/L (ref 7–15)
APPEARANCE UR: CLEAR
BACTERIA #/AREA URNS HPF: ABNORMAL /HPF
BASOPHILS # BLD AUTO: 0.1 10E3/UL (ref 0–0.2)
BASOPHILS NFR BLD AUTO: 1 %
BILIRUB UR QL STRIP: NEGATIVE
BUN SERPL-MCNC: 12.1 MG/DL (ref 6–20)
CALCIUM SERPL-MCNC: 9.4 MG/DL (ref 8.6–10)
CHLORIDE SERPL-SCNC: 106 MMOL/L (ref 98–107)
COLOR UR AUTO: COLORLESS
CREAT SERPL-MCNC: 0.66 MG/DL (ref 0.51–0.95)
DEPRECATED HCO3 PLAS-SCNC: 24 MMOL/L (ref 22–29)
EGFRCR SERPLBLD CKD-EPI 2021: >90 ML/MIN/1.73M2
EOSINOPHIL # BLD AUTO: 0.1 10E3/UL (ref 0–0.7)
EOSINOPHIL NFR BLD AUTO: 1 %
ERYTHROCYTE [DISTWIDTH] IN BLOOD BY AUTOMATED COUNT: 13.3 % (ref 10–15)
GLUCOSE SERPL-MCNC: 107 MG/DL (ref 70–99)
GLUCOSE UR STRIP-MCNC: NEGATIVE MG/DL
HCT VFR BLD AUTO: 42.9 % (ref 35–47)
HGB BLD-MCNC: 13.6 G/DL (ref 11.7–15.7)
HGB UR QL STRIP: ABNORMAL
IMM GRANULOCYTES # BLD: 0 10E3/UL
IMM GRANULOCYTES NFR BLD: 0 %
KETONES UR STRIP-MCNC: NEGATIVE MG/DL
LEUKOCYTE ESTERASE UR QL STRIP: ABNORMAL
LYMPHOCYTES # BLD AUTO: 2 10E3/UL (ref 0.8–5.3)
LYMPHOCYTES NFR BLD AUTO: 20 %
MCH RBC QN AUTO: 26.8 PG (ref 26.5–33)
MCHC RBC AUTO-ENTMCNC: 31.7 G/DL (ref 31.5–36.5)
MCV RBC AUTO: 85 FL (ref 78–100)
MONOCYTES # BLD AUTO: 0.5 10E3/UL (ref 0–1.3)
MONOCYTES NFR BLD AUTO: 5 %
MUCOUS THREADS #/AREA URNS LPF: PRESENT /LPF
NEUTROPHILS # BLD AUTO: 7.4 10E3/UL (ref 1.6–8.3)
NEUTROPHILS NFR BLD AUTO: 74 %
NITRATE UR QL: NEGATIVE
NRBC # BLD AUTO: 0 10E3/UL
NRBC BLD AUTO-RTO: 0 /100
PH UR STRIP: 6 [PH] (ref 5–7)
PLATELET # BLD AUTO: 296 10E3/UL (ref 150–450)
POTASSIUM SERPL-SCNC: 4.5 MMOL/L (ref 3.4–5.3)
RBC # BLD AUTO: 5.07 10E6/UL (ref 3.8–5.2)
RBC URINE: 1 /HPF
SODIUM SERPL-SCNC: 142 MMOL/L (ref 135–145)
SP GR UR STRIP: 1 (ref 1–1.03)
UROBILINOGEN UR STRIP-MCNC: <2 MG/DL
WBC # BLD AUTO: 10 10E3/UL (ref 4–11)
WBC URINE: 27 /HPF

## 2024-06-28 PROCEDURE — 80048 BASIC METABOLIC PNL TOTAL CA: CPT | Performed by: FAMILY MEDICINE

## 2024-06-28 PROCEDURE — 87186 SC STD MICRODIL/AGAR DIL: CPT | Performed by: STUDENT IN AN ORGANIZED HEALTH CARE EDUCATION/TRAINING PROGRAM

## 2024-06-28 PROCEDURE — 81001 URINALYSIS AUTO W/SCOPE: CPT | Performed by: STUDENT IN AN ORGANIZED HEALTH CARE EDUCATION/TRAINING PROGRAM

## 2024-06-28 PROCEDURE — 85025 COMPLETE CBC W/AUTO DIFF WBC: CPT | Performed by: FAMILY MEDICINE

## 2024-06-28 PROCEDURE — 99284 EMERGENCY DEPT VISIT MOD MDM: CPT | Mod: 25

## 2024-06-28 PROCEDURE — 87086 URINE CULTURE/COLONY COUNT: CPT | Performed by: STUDENT IN AN ORGANIZED HEALTH CARE EDUCATION/TRAINING PROGRAM

## 2024-06-28 PROCEDURE — 36415 COLL VENOUS BLD VENIPUNCTURE: CPT | Performed by: FAMILY MEDICINE

## 2024-06-28 PROCEDURE — 74176 CT ABD & PELVIS W/O CONTRAST: CPT

## 2024-06-28 PROCEDURE — 250N000013 HC RX MED GY IP 250 OP 250 PS 637: Performed by: FAMILY MEDICINE

## 2024-06-28 RX ORDER — CEFDINIR 300 MG/1
300 CAPSULE ORAL 2 TIMES DAILY
Qty: 20 CAPSULE | Refills: 0 | Status: SHIPPED | OUTPATIENT
Start: 2024-06-28 | End: 2024-07-08

## 2024-06-28 RX ORDER — PHENAZOPYRIDINE HYDROCHLORIDE 200 MG/1
200 TABLET, FILM COATED ORAL 3 TIMES DAILY
Qty: 9 TABLET | Refills: 0 | Status: SHIPPED | OUTPATIENT
Start: 2024-06-28 | End: 2024-07-01

## 2024-06-28 RX ORDER — PHENAZOPYRIDINE HYDROCHLORIDE 100 MG/1
200 TABLET, FILM COATED ORAL ONCE
Status: COMPLETED | OUTPATIENT
Start: 2024-06-28 | End: 2024-06-28

## 2024-06-28 RX ADMIN — PHENAZOPYRIDINE 200 MG: 100 TABLET ORAL at 12:49

## 2024-06-28 ASSESSMENT — ENCOUNTER SYMPTOMS
DYSURIA: 1
FEVER: 0
ABDOMINAL PAIN: 1
FREQUENCY: 1
HEMATURIA: 0

## 2024-06-28 ASSESSMENT — COLUMBIA-SUICIDE SEVERITY RATING SCALE - C-SSRS
1. IN THE PAST MONTH, HAVE YOU WISHED YOU WERE DEAD OR WISHED YOU COULD GO TO SLEEP AND NOT WAKE UP?: NO
2. HAVE YOU ACTUALLY HAD ANY THOUGHTS OF KILLING YOURSELF IN THE PAST MONTH?: NO
6. HAVE YOU EVER DONE ANYTHING, STARTED TO DO ANYTHING, OR PREPARED TO DO ANYTHING TO END YOUR LIFE?: NO

## 2024-06-28 ASSESSMENT — ACTIVITIES OF DAILY LIVING (ADL)
ADLS_ACUITY_SCORE: 35

## 2024-06-28 NOTE — DISCHARGE INSTRUCTIONS
Keep your current urology appointment.  Call to see if they have any earlier appointments available.

## 2024-06-28 NOTE — ED PROVIDER NOTES
EMERGENCY DEPARTMENT ENCOUNTER      NAME: Law Cruz  AGE: 59 year old female  YOB: 1965  MRN: [id number]  EVALUATION DATE & TIME: No admission date for patient encounter.    PCP: Campbell Honeycutt    ED PROVIDER: Venkatesh Benedict M.D.    Chief Complaint   Patient presents with    Urinary Frequency       FINAL IMPRESSION:  1. Recurrent UTI        ED COURSE & MEDICAL DECISION MAKING:    Pertinent Labs & Imaging studies independently interpreted by me. (See chart for details)  Reviewed most recent emergency department visit from [facility] which was for cystitis and hematuria, at that time diagnosed with urinary tract infection and started on Omnicef, culture with E. coli 10-50,000 CFU.  Review of chart shows no recent abdominal imaging.    ED Course as of 06/28/24 1243   Fri Jun 28, 2024   1040 Patient seen and examined, urinary frequency and dysuria for about 1 week.  No blood in the urine.  Recently was treated for urinary tract infection and says she completed the antibiotics.  Complains also of generalized abdominal pain.  On exam here, patient is comfortable, no guarding or rebound on exam.  Labs are ordered along with Toradol.  Given recurrent urinary tract infections and abdominal pain, CT scan is ordered to evaluate for stones or other abnormality with that could contribute to recurrent infections.   1101 Urinalysis ordered and independently interpreted by me with some white cells and few bacteria consistent with possible infection although improved from prior.   1156 CT scan abdomen and pelvis independently interpreted by me does not demonstrate hydronephrosis, hydroureter, or other intra-abdominal pathology.  Patient will be started on Rocephin in the emergency department and Omnicef for home with plan for urology follow-up as scheduled for recurrent urinary tract infections.         At the conclusion of the encounter I discussed the results of all of the tests and the disposition. The questions  were answered. The patient or family acknowledged understanding and was agreeable with the care plan.     Medical Decision Making  Obtained supplemental history:Supplemental history obtained?: No  Reviewed external records: External records reviewed?: Documented in chart  Care impacted by chronic illness:N/A  Care significantly affected by social determinants of health:N/A  Did you consider but not order tests?: Work up considered but not performed and documented in chart, if applicable  Did you interpret images independently?: Independent interpretation of ECG and images noted in documentation, when applicable.  Consultation discussion with other provider:Did you involve another provider (consultant, , pharmacy, etc.)?: I discussed the care with another health care provider, see documentation for details.  Discharge. I prescribed additional prescription strength medication(s) as charted. N/A.      MEDICATIONS GIVEN IN THE EMERGENCY:  Medications   phenazopyridine (PYRIDIUM) tablet 200 mg (has no administration in time range)       NEW PRESCRIPTIONS STARTED AT TODAY'S ER VISIT  New Prescriptions    CEFDINIR (OMNICEF) 300 MG CAPSULE    Take 1 capsule (300 mg) by mouth 2 times daily for 10 days    PHENAZOPYRIDINE (PYRIDIUM) 200 MG TABLET    Take 1 tablet (200 mg) by mouth 3 times daily for 3 days       =================================================================    HPI    Patient information was obtained from: patient       Law Cruz is a 59 year old female with a pertinent history of chronic back pain, meningioma, anemia, who presents to this ED via walk-in for evaluation of urinary frequency.    About 1 week ago, the patient developed urinary frequency and dysuria, with no hematuria. She also has some generalized abdominal pain, and denies fever. She was recently treated for a UTI and notes completing her course of antibiotics.    Per chart review, the patient presented to [facility] ER on 10-Zac-2024 for  evaluation of dysuria. Patient reported having similar symptoms with a previous UTI. UA showed bit of blood, some leukocyte esterase, and some bacteria with a significant amount of white blood cells. Placed on Cefdinir and given Zofran.      REVIEW OF SYSTEMS   Review of Systems   Constitutional:  Negative for fever.   Gastrointestinal:  Positive for abdominal pain.   Genitourinary:  Positive for dysuria and frequency. Negative for hematuria.      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 No pertinent surgical history.    CURRENT MEDICATIONS:    Current Facility-Administered Medications   Medication Dose Route Frequency Provider Last Rate Last Admin    phenazopyridine (PYRIDIUM) tablet 200 mg  200 mg Oral Once Venkatesh Benedict MD         Current Outpatient Medications   Medication Sig Dispense Refill    cefdinir (OMNICEF) 300 MG capsule Take 1 capsule (300 mg) by mouth 2 times daily for 10 days 20 capsule 0    phenazopyridine (PYRIDIUM) 200 MG tablet Take 1 tablet (200 mg) by mouth 3 times daily for 3 days 9 tablet 0    acetaminophen (TYLENOL) 500 MG tablet [ACETAMINOPHEN (TYLENOL) 500 MG TABLET] Take 1 tablet (500 mg total) by mouth every 6 (six) hours as needed for pain. 100 tablet 11    gabapentin (NEURONTIN) 100 MG capsule Take 1 capsule (100 mg) by mouth 2 times daily 60 capsule 11    guaiFENesin-codeine (ROBITUSSIN AC) 100-10 MG/5ML solution Take 5-10 mLs by mouth every 4 hours as needed for cough 237 mL 1    hydrocortisone-aloe 1 % CREA [HYDROCORTISONE WITH ALOE 1 % CREA CREAM] Apply thin layer to affected area twice a day 56 g 3    ibuprofen (ADVIL,MOTRIN) 400 MG tablet  [IBUPROFEN (ADVIL,MOTRIN) 400 MG TABLET] Take 1 tablet (400 mg total) by mouth daily as needed for pain. 30 tablet 11    meclizine (ANTIVERT) 25 MG tablet Take 1 tablet (25 mg) by mouth 3 times daily as needed for dizziness 40 tablet 11    polyethylene glycol (MIRALAX) 17 GM/Dose powder Take 17 g (1 capful) by mouth daily 507 g 11    triamcinolone (KENALOG) 0.1 % external cream Apply 1 gm to arms 2 times daily 80 g 3       ALLERGIES:  Allergies   Allergen Reactions    No Known Drug Allergy Unknown       FAMILY HISTORY:  Family History   Problem Relation Age of Onset    Breast Cancer No family hx of     Ovarian Cancer No family hx of        SOCIAL HISTORY:   Social History     Socioeconomic History    Marital status:    Tobacco Use    Smoking status: Never     Passive exposure: Never    Smokeless tobacco: Current     Types: Chew    Tobacco comments:     Betel Nut w/ tobacco   Vaping Use    Vaping status: Never Used   Substance and Sexual Activity    Alcohol use: No    Drug use: No    Sexual activity: Yes     Partners: Male     Social Determinants of Health     Financial Resource Strain: Low Risk  (1/5/2024)    Financial Resource Strain     Within the past 12 months, have you or your family members you live with been unable to get utilities (heat, electricity) when it was really needed?: No   Food Insecurity: High Risk (1/5/2024)    Food Insecurity     Within the past 12 months, did you worry that your food would run out before you got money to buy more?: Yes     Within the past 12 months, did the food you bought just not last and you didn t have money to get more?: Yes   Transportation Needs: Low Risk  (1/5/2024)    Transportation Needs     Within the past 12 months, has lack of transportation kept you from medical appointments, getting your medicines, non-medical meetings or appointments, work, or from getting things that you need?: No   Social Connections: Unknown (11/2/2022)    Social Connection and  "Isolation Panel [NHANES]     Frequency of Communication with Friends and Family: Three times a week     Frequency of Social Gatherings with Friends and Family: Three times a week   Interpersonal Safety: Low Risk  (5/24/2024)    Interpersonal Safety     Do you feel physically and emotionally safe where you currently live?: Yes     Within the past 12 months, have you been hit, slapped, kicked or otherwise physically hurt by someone?: No     Within the past 12 months, have you been humiliated or emotionally abused in other ways by your partner or ex-partner?: No   Housing Stability: Low Risk  (1/5/2024)    Housing Stability     Do you have housing? : Yes     Are you worried about losing your housing?: No       VITALS:  BP (!) 144/78   Pulse 75   Temp 97.6  F (36.4  C) (Temporal)   Resp 18   Ht 1.575 m (5' 2\")   Wt 68 kg (150 lb)   LMP 05/01/2021 (Within Weeks)   SpO2 96%   BMI 27.44 kg/m      PHYSICAL EXAM:   Physical Exam  Vitals and nursing note reviewed.   Constitutional:       Appearance: Normal appearance.   HENT:      Head: Normocephalic and atraumatic.      Right Ear: External ear normal.      Left Ear: External ear normal.      Nose: Nose normal.      Mouth/Throat:      Mouth: Mucous membranes are moist.   Eyes:      Extraocular Movements: Extraocular movements intact.      Conjunctiva/sclera: Conjunctivae normal.      Pupils: Pupils are equal, round, and reactive to light.   Cardiovascular:      Rate and Rhythm: Normal rate and regular rhythm.   Pulmonary:      Effort: Pulmonary effort is normal.      Breath sounds: Normal breath sounds. No wheezing or rales.   Abdominal:      General: Abdomen is flat. There is no distension.      Palpations: Abdomen is soft.      Tenderness: There is no abdominal tenderness. There is no guarding.   Musculoskeletal:         General: Normal range of motion.      Cervical back: Normal range of motion and neck supple.      Right lower leg: No edema.      Left lower leg: " No edema.   Lymphadenopathy:      Cervical: No cervical adenopathy.   Skin:     General: Skin is warm and dry.   Neurological:      General: No focal deficit present.      Mental Status: She is alert and oriented to person, place, and time. Mental status is at baseline.      Comments: No gross focal neurologic deficits   Psychiatric:         Mood and Affect: Mood normal.         Behavior: Behavior normal.         Thought Content: Thought content normal.          LAB:  All pertinent labs reviewed and interpreted.  Results for orders placed or performed during the hospital encounter of 06/28/24   Abd/pelvis CT no contrast - Stone Protocol    Impression    IMPRESSION:   1.  No findings to explain the patient's symptoms. No hydronephrosis, urinary tract stone or inflammatory process.     UA with Microscopic reflex to Culture    Specimen: Urine, Midstream   Result Value Ref Range    Color Urine Colorless Colorless, Straw, Light Yellow, Yellow    Appearance Urine Clear Clear    Glucose Urine Negative Negative mg/dL    Bilirubin Urine Negative Negative    Ketones Urine Negative Negative mg/dL    Specific Gravity Urine 1.002 1.001 - 1.030    Blood Urine 0.03 mg/dL (A) Negative    pH Urine 6.0 5.0 - 7.0    Protein Albumin Urine Negative Negative mg/dL    Urobilinogen Urine <2.0 <2.0 mg/dL    Nitrite Urine Negative Negative    Leukocyte Esterase Urine 500 Mony/uL (A) Negative    Bacteria Urine Few (A) None Seen /HPF    Mucus Urine Present (A) None Seen /LPF    RBC Urine 1 <=2 /HPF    WBC Urine 27 (H) <=5 /HPF   Basic metabolic panel   Result Value Ref Range    Sodium 142 135 - 145 mmol/L    Potassium 4.5 3.4 - 5.3 mmol/L    Chloride 106 98 - 107 mmol/L    Carbon Dioxide (CO2) 24 22 - 29 mmol/L    Anion Gap 12 7 - 15 mmol/L    Urea Nitrogen 12.1 6.0 - 20.0 mg/dL    Creatinine 0.66 0.51 - 0.95 mg/dL    GFR Estimate >90 >60 mL/min/1.73m2    Calcium 9.4 8.6 - 10.0 mg/dL    Glucose 107 (H) 70 - 99 mg/dL   CBC with platelets and  differential   Result Value Ref Range    WBC Count 10.0 4.0 - 11.0 10e3/uL    RBC Count 5.07 3.80 - 5.20 10e6/uL    Hemoglobin 13.6 11.7 - 15.7 g/dL    Hematocrit 42.9 35.0 - 47.0 %    MCV 85 78 - 100 fL    MCH 26.8 26.5 - 33.0 pg    MCHC 31.7 31.5 - 36.5 g/dL    RDW 13.3 10.0 - 15.0 %    Platelet Count 296 150 - 450 10e3/uL    % Neutrophils 74 %    % Lymphocytes 20 %    % Monocytes 5 %    % Eosinophils 1 %    % Basophils 1 %    % Immature Granulocytes 0 %    NRBCs per 100 WBC 0 <1 /100    Absolute Neutrophils 7.4 1.6 - 8.3 10e3/uL    Absolute Lymphocytes 2.0 0.8 - 5.3 10e3/uL    Absolute Monocytes 0.5 0.0 - 1.3 10e3/uL    Absolute Eosinophils 0.1 0.0 - 0.7 10e3/uL    Absolute Basophils 0.1 0.0 - 0.2 10e3/uL    Absolute Immature Granulocytes 0.0 <=0.4 10e3/uL    Absolute NRBCs 0.0 10e3/uL       RADIOLOGY:  Reviewed all pertinent imaging. Please see official radiology report.  Abd/pelvis CT no contrast - Stone Protocol   Final Result   IMPRESSION:    1.  No findings to explain the patient's symptoms. No hydronephrosis, urinary tract stone or inflammatory process.             I, Jermaine Velasquez, am serving as a scribe to document services personally performed by Dr. Benedict based on my observation and the provider's statements to me. I, Venkatesh Benedict MD attest that Jermaine Velasquez is acting in a scribe capacity, has observed my performance of the services and has documented them in accordance with my direction.    Venkatesh Benedict M.D.  Emergency Medicine  [facility] EMERGENCY DEPARTMENT  [address] [address] [phone]  Dept: [phone]       Venkatesh Benedict MD  06/28/24 7618

## 2024-06-28 NOTE — ED TRIAGE NOTES
Patient has been having urinary frequency with pain for about a week. Denies fever. Urine sample obtained.     Triage Assessment (Adult)       Row Name 06/28/24 1011          Triage Assessment    Airway WDL WDL        Respiratory WDL    Respiratory WDL WDL        Skin Circulation/Temperature WDL    Skin Circulation/Temperature WDL WDL        Cardiac WDL    Cardiac WDL WDL        Peripheral/Neurovascular WDL    Peripheral Neurovascular WDL WDL        Cognitive/Neuro/Behavioral WDL    Cognitive/Neuro/Behavioral WDL WDL

## 2024-06-30 ENCOUNTER — TELEPHONE (OUTPATIENT)
Dept: NURSING | Facility: CLINIC | Age: 59
End: 2024-06-30
Payer: COMMERCIAL

## 2024-06-30 LAB — BACTERIA UR CULT: ABNORMAL

## 2024-06-30 NOTE — TELEPHONE ENCOUNTER
[facility]    Reason for call: Lab Result Notification     Lab Result (including Rx patient on, if applicable).  If culture, copy of lab report at bottom.  Lab Result: Final Urine Culture Report on 6/30/24  [facility] Emergency Dept discharge antibiotic prescribed: Cefdinir (Omnicef) 300 mg capsule, 1 capsule (300 mg) by mouth 2 times daily for 10 days   Bacterial growth: 10,000 - 50,000 CFU/mL Proteus mirabilis     Patient's current Symptoms:   At 12:15P, Tried using [employer] , Patient hung up after answering  [employer] and [facility]ian need different interpreters.    Per  line, no [employer]  available today.    At 12:20P, there is consent to communicate with her children (including Mckenna, her daughter).  RN spoke with Mckenna, her daughter, about the urine culture result  She confirms her mother's sx's are getting better as she saw her yesterday    RN Recommendations/Instructions per [place] ED lab result protocol:   [facility] ED lab result protocol utilized: urine cx    Patient/care giver notified to contact your PCP clinic or return to the Emergency department if your:  Symptoms do not resolve after completing antibiotic.  Symptoms worsen or other concerning symptoms.        Gregorio Phoenix RN

## 2024-07-02 ENCOUNTER — OFFICE VISIT (OUTPATIENT)
Dept: FAMILY MEDICINE | Facility: CLINIC | Age: 59
End: 2024-07-02
Payer: COMMERCIAL

## 2024-07-02 VITALS
RESPIRATION RATE: 16 BRPM | HEIGHT: 62 IN | BODY MASS INDEX: 27.1 KG/M2 | SYSTOLIC BLOOD PRESSURE: 128 MMHG | OXYGEN SATURATION: 99 % | TEMPERATURE: 98 F | HEART RATE: 74 BPM | DIASTOLIC BLOOD PRESSURE: 82 MMHG | WEIGHT: 147.25 LBS

## 2024-07-02 DIAGNOSIS — Z71.84 TRAVEL ADVICE ENCOUNTER: ICD-10-CM

## 2024-07-02 DIAGNOSIS — Z23 NEED FOR VACCINATION: ICD-10-CM

## 2024-07-02 DIAGNOSIS — R52 PAIN: ICD-10-CM

## 2024-07-02 DIAGNOSIS — N30.00 ACUTE CYSTITIS WITHOUT HEMATURIA: Primary | ICD-10-CM

## 2024-07-02 PROCEDURE — 99214 OFFICE O/P EST MOD 30 MIN: CPT | Mod: 25 | Performed by: FAMILY MEDICINE

## 2024-07-02 PROCEDURE — 90471 IMMUNIZATION ADMIN: CPT | Performed by: FAMILY MEDICINE

## 2024-07-02 PROCEDURE — 90691 TYPHOID VACCINE IM: CPT | Performed by: FAMILY MEDICINE

## 2024-07-02 RX ORDER — ACETAMINOPHEN 500 MG
500 TABLET ORAL EVERY 6 HOURS PRN
Qty: 100 TABLET | Refills: 11 | Status: SHIPPED | OUTPATIENT
Start: 2024-07-02

## 2024-07-02 RX ORDER — DOXYCYCLINE HYCLATE 100 MG
TABLET ORAL
Qty: 40 TABLET | Refills: 0 | Status: SHIPPED | OUTPATIENT
Start: 2024-07-02

## 2024-07-02 NOTE — PROGRESS NOTES
"  Assessment & Plan     Acute cystitis without hematuria    Resolved.      Travel advice encounter    - doxycycline hyclate (VIBRA-TABS) 100 MG tablet  Dispense: 40 tablet; Refill: 0    Need for vaccination    - TYPHOID VACCINE, IM    Pain    - acetaminophen (TYLENOL) 500 MG tablet  Dispense: 100 tablet; Refill: 11      Has AWV in August.      Review of the result(s) of each unique test - CBC, BMP, UC  Prescription drug management        MED REC REQUIRED  Post Medication Reconciliation Status: patient was not discharged from an inpatient facility or TCU  BMI  Estimated body mass index is 26.93 kg/m  as calculated from the following:    Height as of this encounter: 1.575 m (5' 2\").    Weight as of this encounter: 66.8 kg (147 lb 4 oz).             Subjective   Law is a 59 year old, presenting for the following health issues:  Hospital F/U        7/2/2024    12:03 PM   Additional Questions   Roomed by Erica DUNCAN     HPI           Was at ER 6/28/24 for UTI.  Proteus.  Treated with Omnicef.  Also 6/10/24.  E coli, treated with Omnicef.    Going to [facility] 7/17/24.    Current Outpatient Medications   Medication Sig Dispense Refill    acetaminophen (TYLENOL) 500 MG tablet Take 1 tablet (500 mg) by mouth every 6 hours as needed 100 tablet 11    cefdinir (OMNICEF) 300 MG capsule Take 1 capsule (300 mg) by mouth 2 times daily for 10 days 20 capsule 0    doxycycline hyclate (VIBRA-TABS) 100 MG tablet 1 tablet daily while traveling, and for 7 days after 40 tablet 0    gabapentin (NEURONTIN) 100 MG capsule Take 1 capsule (100 mg) by mouth 2 times daily 60 capsule 11    guaiFENesin-codeine (ROBITUSSIN AC) 100-10 MG/5ML solution Take 5-10 mLs by mouth every 4 hours as needed for cough 237 mL 1    hydrocortisone-aloe 1 % CREA [HYDROCORTISONE WITH ALOE 1 % CREA CREAM] Apply thin layer to affected area twice a day 56 g 3    ibuprofen (ADVIL,MOTRIN) 400 MG tablet [IBUPROFEN (ADVIL,MOTRIN) 400 MG TABLET] Take 1 tablet (400 mg total) by " "mouth daily as needed for pain. 30 tablet 11    meclizine (ANTIVERT) 25 MG tablet Take 1 tablet (25 mg) by mouth 3 times daily as needed for dizziness 40 tablet 11    polyethylene glycol (MIRALAX) 17 GM/Dose powder Take 17 g (1 capful) by mouth daily 507 g 11    triamcinolone (KENALOG) 0.1 % external cream Apply 1 gm to arms 2 times daily 80 g 3           Objective    /82   Pulse 74   Temp 98  F (36.7  C) (Oral)   Resp 16   Ht 1.575 m (5' 2\")   Wt 66.8 kg (147 lb 4 oz)   LMP 05/01/2021 (Within Weeks)   SpO2 99%   BMI 26.93 kg/m    Body mass index is 26.93 kg/m .  Physical Exam     Heart normal  Lungs normal  Abdomen normal  No CVA tenderness          Signed Electronically by: Campbell Honeycutt MD    "

## 2025-02-11 ENCOUNTER — OFFICE VISIT (OUTPATIENT)
Dept: FAMILY MEDICINE | Facility: CLINIC | Age: 60
End: 2025-02-11

## 2025-02-11 ENCOUNTER — ANCILLARY PROCEDURE (OUTPATIENT)
Dept: GENERAL RADIOLOGY | Facility: CLINIC | Age: 60
End: 2025-02-11
Attending: FAMILY MEDICINE

## 2025-02-11 VITALS
WEIGHT: 143.4 LBS | HEART RATE: 76 BPM | OXYGEN SATURATION: 98 % | RESPIRATION RATE: 16 BRPM | SYSTOLIC BLOOD PRESSURE: 122 MMHG | TEMPERATURE: 98.8 F | BODY MASS INDEX: 26.39 KG/M2 | DIASTOLIC BLOOD PRESSURE: 74 MMHG | HEIGHT: 62 IN

## 2025-02-11 DIAGNOSIS — R10.9 LEFT SIDED ABDOMINAL PAIN: ICD-10-CM

## 2025-02-11 DIAGNOSIS — K59.00 CONSTIPATION, UNSPECIFIED CONSTIPATION TYPE: ICD-10-CM

## 2025-02-11 DIAGNOSIS — R10.9 LEFT SIDED ABDOMINAL PAIN: Primary | ICD-10-CM

## 2025-02-11 LAB
ALBUMIN UR-MCNC: 30 MG/DL
APPEARANCE UR: CLEAR
BACTERIA #/AREA URNS HPF: ABNORMAL /HPF
BASOPHILS # BLD AUTO: 0 10E3/UL (ref 0–0.2)
BASOPHILS NFR BLD AUTO: 0 %
BILIRUB UR QL STRIP: NEGATIVE
COLOR UR AUTO: YELLOW
EOSINOPHIL # BLD AUTO: 0.1 10E3/UL (ref 0–0.7)
EOSINOPHIL NFR BLD AUTO: 1 %
ERYTHROCYTE [DISTWIDTH] IN BLOOD BY AUTOMATED COUNT: 12.6 % (ref 10–15)
ERYTHROCYTE [SEDIMENTATION RATE] IN BLOOD BY WESTERGREN METHOD: 41 MM/HR (ref 0–30)
GLUCOSE UR STRIP-MCNC: NEGATIVE MG/DL
HCT VFR BLD AUTO: 40.5 % (ref 35–47)
HGB BLD-MCNC: 13.4 G/DL (ref 11.7–15.7)
HGB UR QL STRIP: ABNORMAL
IMM GRANULOCYTES # BLD: 0 10E3/UL
IMM GRANULOCYTES NFR BLD: 0 %
KETONES UR STRIP-MCNC: NEGATIVE MG/DL
LEUKOCYTE ESTERASE UR QL STRIP: ABNORMAL
LYMPHOCYTES # BLD AUTO: 2.5 10E3/UL (ref 0.8–5.3)
LYMPHOCYTES NFR BLD AUTO: 25 %
MCH RBC QN AUTO: 27.7 PG (ref 26.5–33)
MCHC RBC AUTO-ENTMCNC: 33.1 G/DL (ref 31.5–36.5)
MCV RBC AUTO: 84 FL (ref 78–100)
MONOCYTES # BLD AUTO: 0.6 10E3/UL (ref 0–1.3)
MONOCYTES NFR BLD AUTO: 6 %
MUCOUS THREADS #/AREA URNS LPF: PRESENT /LPF
NEUTROPHILS # BLD AUTO: 6.6 10E3/UL (ref 1.6–8.3)
NEUTROPHILS NFR BLD AUTO: 67 %
NITRATE UR QL: NEGATIVE
PH UR STRIP: 5.5 [PH] (ref 5–7)
PLATELET # BLD AUTO: 278 10E3/UL (ref 150–450)
RBC # BLD AUTO: 4.83 10E6/UL (ref 3.8–5.2)
RBC #/AREA URNS AUTO: ABNORMAL /HPF
SP GR UR STRIP: 1.02 (ref 1–1.03)
SQUAMOUS #/AREA URNS AUTO: ABNORMAL /LPF
TRI-PHOS CRY #/AREA URNS HPF: ABNORMAL /HPF
UROBILINOGEN UR STRIP-ACNC: 0.2 E.U./DL
WBC # BLD AUTO: 9.8 10E3/UL (ref 4–11)
WBC #/AREA URNS AUTO: ABNORMAL /HPF

## 2025-02-11 PROCEDURE — 74019 RADEX ABDOMEN 2 VIEWS: CPT | Mod: TC | Performed by: RADIOLOGY

## 2025-02-11 PROCEDURE — 86140 C-REACTIVE PROTEIN: CPT | Performed by: FAMILY MEDICINE

## 2025-02-11 PROCEDURE — 36415 COLL VENOUS BLD VENIPUNCTURE: CPT | Performed by: FAMILY MEDICINE

## 2025-02-11 PROCEDURE — 85652 RBC SED RATE AUTOMATED: CPT | Performed by: FAMILY MEDICINE

## 2025-02-11 PROCEDURE — 99214 OFFICE O/P EST MOD 30 MIN: CPT | Performed by: FAMILY MEDICINE

## 2025-02-11 PROCEDURE — 85025 COMPLETE CBC W/AUTO DIFF WBC: CPT | Performed by: FAMILY MEDICINE

## 2025-02-11 PROCEDURE — 80048 BASIC METABOLIC PNL TOTAL CA: CPT | Performed by: FAMILY MEDICINE

## 2025-02-11 PROCEDURE — 81001 URINALYSIS AUTO W/SCOPE: CPT | Performed by: FAMILY MEDICINE

## 2025-02-11 RX ORDER — POLYETHYLENE GLYCOL 3350 17 G/17G
1 POWDER, FOR SOLUTION ORAL DAILY PRN
Qty: 578 G | Refills: 1 | Status: SHIPPED | OUTPATIENT
Start: 2025-02-11

## 2025-02-11 NOTE — PROGRESS NOTES
"OFFICE VISIT    Assessment/Plan:     Patient Instructions:    -Complete the tests as ordered.     -Stay well-hydrated.  Try to drink 64 ounces of water each day.  Extra water will help the stools become softer.  -Stay active as this helps to slow movement through the body.  -Increase your intake of fruits and vegetables, increase the fiber intake to help the stools become softer.  -Fruits that start with the letter \"P\" helps with \"pooping.\"  For example: Pears, prunes, peaches, pineapples, etc.  -Try to limit use of over-the-counter stool softener medications.  Long-term consistent use of these medications can cause your body to become reliant on them.      Please seek immediate medical attention (go to the emergency room or urgent care) for the following reasons: worsening symptoms, or any concerning changes.      Diagnoses and all orders for this visit:    Left sided abdominal pain  Constipation, unspecified constipation type: Patient endorsing generalized discomforts (was not in the right upper quadrant processes.  Pain is worse on the left abdominal region as well as in the left pelvic region.  There is some firmness to suggest presence of stool.  Patient does endorse constipation symptoms.  Plan to complete imaging and blood testing as below.  No systemic signs of infection at this time.  Initiate MiraLAX.  Discussed with patient that if symptoms are not significantly better in 2 weeks, she should return for reevaluation.  She expresses understanding and agreement with the plan.  -     XR Abdomen 2 Views; Future  -     Basic metabolic panel; Future  -     CBC with Platelets & Differential; Future  -     CRP inflammation; Future  -     Erythrocyte sedimentation rate auto; Future  -     polyethylene glycol (MIRALAX) 17 GM/Dose powder; Take 17 g (1 Capful) by mouth daily as needed for constipation.  -     UA Macroscopic with reflex to Microscopic and Culture; Future        Return in about 2 weeks (around " 2/25/2025), or if symptoms worsen or fail to improve.      The diagnoses, treatment options, risk, benefits, and recommendations were reviewed with patient/guardian.  Questions were answered to patient's/guardian satisfaction.  Red flag signs were reviewed.  Patient/guardian is in agreement with above plan.      Subjective: 60 year old female with history of chronic bilateral low back pain with bilateral sciatica, meningioma, memory loss, anemia who presents to clinic for the following complaints:   No chief complaint on file.    Answers submitted by the patient for this visit:  General Questionnaire (Submitted on 2/11/2025)  Chief Complaint: Chronic problems general questions HPI Form  General Concern (Submitted on 2/11/2025)  Chief Complaint: Chronic problems general questions HPI Form  What is the reason for your visit today?: abdominal pain  When did your symptoms begin?: 1-2 weeks ago  What are your symptoms?: upper left abdominal pain spreads to the pelvic region  How would you describe these symptoms?: Moderate  Questionnaire about: Chronic problems general questions HPI Form (Submitted on 2/11/2025)  Chief Complaint: Chronic problems general questions HPI Form    She had a lot of pain last night, so that is why she came to clinic today. The pains started 1-2 weeks ago and got really bad recently. The pain is on the left upper portion of the belly and pelvis, particularly, though pain is noted all over the belly. She has constipation. The stools are firm and comes out as small pieces/lumps. Denies any fevers, chills, nausea, vomiting, bloody/black tarry stools, diarrhea, burning symptoms, dysuria, urinary frequency, or other changes from baseline.    No diverticulosis noted on colonoscopy from 05/15/44863 (most recent colonoscopy).     A professional [employer] telephone  was utilized for the office visit.     The 10 point review of system is negative except as stated in the HPI.    Allergies  "were reviewed and updated.    Objective:   /74   Pulse 76   Temp 98.8  F (37.1  C) (Oral)   Resp 16   Ht 1.565 m (5' 1.61\")   Wt 65 kg (143 lb 6.4 oz)   LMP 05/01/2021 (Within Weeks)   SpO2 98%   BMI 26.56 kg/m    General: Active, alert, nontoxic-appearing.  No acute distress.  HEENT: Normocephalic, atraumatic.  Pupils are equal and round.  Sclera is clear.  Normal external ears. Nares patent.  Moist mucous membranes.    Cardiac: RRR.  S1, S2 present.  No murmurs, rubs, or gallops.  Respiratory/chest: Clear to auscultation bilaterally.  No wheezes, rales, rhonchi.  Breathing is not labored.  No accessory muscle usage.  Back: Mild left CVA tenderness.  No CVA tenderness on the right.  Abdomen: generalized abdominal pain, worse in the left upper and lower quadrants and left pelvic region. Abdomen is soft, mildly distended. Some firmness  No masses or organomegaly noted.  No guarding or rebound tenderness appreciated.  Extremities: Voluntary movements intact.  Integumentary: No concerning rash or skin changes appreciated.        Yohan Penn MD  [facility] [place] [address]  Phone: [phone]  Fax: [phone]    2/11/2025  3:59 PM            Current Outpatient Medications   Medication Sig Dispense Refill    polyethylene glycol (MIRALAX) 17 GM/Dose powder Take 17 g (1 Capful) by mouth daily as needed for constipation. 578 g 1    acetaminophen (TYLENOL) 500 MG tablet Take 1 tablet (500 mg) by mouth every 6 hours as needed 100 tablet 11    gabapentin (NEURONTIN) 100 MG capsule Take 1 capsule (100 mg) by mouth 2 times daily 60 capsule 11    ibuprofen (ADVIL,MOTRIN) 400 MG tablet [IBUPROFEN (ADVIL,MOTRIN) 400 MG TABLET] Take 1 tablet (400 mg total) by mouth daily as needed for pain. 30 tablet 11    polyethylene glycol (MIRALAX) 17 GM/Dose powder Take 17 g (1 capful) by mouth daily 507 g 11     No current facility-administered medications for this visit.       Allergies "   Allergen Reactions    No Known Drug Allergy Unknown       Patient Active Problem List    Diagnosis Date Noted    Chronic bilateral low back pain with bilateral sciatica 02/24/2023     Priority: Medium    Meningioma (H) 09/14/2019     Priority: Medium    Memory loss 04/09/2018     Priority: Medium    Backache      Priority: Medium     Created by Conversion  Replacement Utility updated for latest IMO load        Carpal Tunnel Syndrome      Priority: Medium     Created by Conversion        Anemia      Priority: Medium     Created by Conversion           Family History   Problem Relation Age of Onset    Breast Cancer No family hx of     Ovarian Cancer No family hx of        No past surgical history on file.     Social History     Socioeconomic History    Marital status:      Spouse name: Not on file    Number of children: Not on file    Years of education: Not on file    Highest education level: Not on file   Occupational History    Not on file   Tobacco Use    Smoking status: Never     Passive exposure: Never    Smokeless tobacco: Current     Types: Chew    Tobacco comments:     Betel Nut w/ tobacco   Vaping Use    Vaping status: Never Used   Substance and Sexual Activity    Alcohol use: No    Drug use: No    Sexual activity: Yes     Partners: Male   Other Topics Concern    Not on file   Social History Narrative    Not on file     Social Drivers of Health     Financial Resource Strain: Low Risk  (1/5/2024)    Financial Resource Strain     Within the past 12 months, have you or your family members you live with been unable to get utilities (heat, electricity) when it was really needed?: No   Food Insecurity: High Risk (1/5/2024)    Food Insecurity     Within the past 12 months, did you worry that your food would run out before you got money to buy more?: Yes     Within the past 12 months, did the food you bought just not last and you didn t have money to get more?: Yes   Transportation Needs: Low Risk   (1/5/2024)    Transportation Needs     Within the past 12 months, has lack of transportation kept you from medical appointments, getting your medicines, non-medical meetings or appointments, work, or from getting things that you need?: No   Physical Activity: Not on file   Stress: Not on file   Social Connections: Unknown (11/2/2022)    Social Connection and Isolation Panel [NHANES]     Frequency of Communication with Friends and Family: Three times a week     Frequency of Social Gatherings with Friends and Family: Three times a week     Attends Jainism Services: Not on file     Active Member of Clubs or Organizations: Not on file     Attends Club or Organization Meetings: Not on file     Marital Status: Not on file   Interpersonal Safety: Low Risk  (2/11/2025)    Interpersonal Safety     Do you feel physically and emotionally safe where you currently live?: Yes     Within the past 12 months, have you been hit, slapped, kicked or otherwise physically hurt by someone?: No     Within the past 12 months, have you been humiliated or emotionally abused in other ways by your partner or ex-partner?: No   Housing Stability: Low Risk  (1/5/2024)    Housing Stability     Do you have housing? : Yes     Are you worried about losing your housing?: No

## 2025-02-11 NOTE — PATIENT INSTRUCTIONS
"-Thank you for choosing the [facility].  -It was a pleasure to see you today.  -Please take a look at the information below for more specific details regarding the treatment plan and recommendations.  -In this after visit summary is a list of your medications and specific instructions.  Please review this carefully as there may be changes made to your medication list.  -If there are any particular questions or concerns, please feel free to reach out to Dr. Penn.  -If any labs have been completed, we will reach out to you about results.  If the results are normal or not concerning, a letter or MyChart message will be sent to you.  If any follow-up is needed, either Dr. Penn or the nurse will give you a call.  If you have not heard regarding results after 2 weeks, please reach out to the clinic.    Patient Instructions:    -Complete the tests as ordered.     -Stay well-hydrated.  Try to drink 64 ounces of water each day.  Extra water will help the stools become softer.  -Stay active as this helps to slow movement through the body.  -Increase your intake of fruits and vegetables, increase the fiber intake to help the stools become softer.  -Fruits that start with the letter \"P\" helps with \"pooping.\"  For example: Pears, prunes, peaches, pineapples, etc.  -Try to limit use of over-the-counter stool softener medications.  Long-term consistent use of these medications can cause your body to become reliant on them.      Please seek immediate medical attention (go to the emergency room or urgent care) for the following reasons: worsening symptoms, or any concerning changes.      --------------------------------------------------------------------------------------------------------------------    -We are always looking for ways to improve.  You may be selected to receive a survey regarding your visit today.  We encourage you to complete the survey and provide specific, constructive feedback to help us " improve our processes.  Thank you for your time!  -Please review the contact information listed on the after visit summary and in the electronic chart.  Below is the phone number that we have on file.  If there are any changes that are needed to be made, please reach out to the clinic.  [phone] (home)

## 2025-02-12 ENCOUNTER — TELEPHONE (OUTPATIENT)
Dept: FAMILY MEDICINE | Facility: CLINIC | Age: 60
End: 2025-02-12

## 2025-02-12 LAB
ANION GAP SERPL CALCULATED.3IONS-SCNC: 13 MMOL/L (ref 7–15)
BUN SERPL-MCNC: 12.6 MG/DL (ref 8–23)
CALCIUM SERPL-MCNC: 9.7 MG/DL (ref 8.8–10.4)
CHLORIDE SERPL-SCNC: 101 MMOL/L (ref 98–107)
CREAT SERPL-MCNC: 0.78 MG/DL (ref 0.51–0.95)
CRP SERPL-MCNC: 42.1 MG/L
EGFRCR SERPLBLD CKD-EPI 2021: 87 ML/MIN/1.73M2
GLUCOSE SERPL-MCNC: 163 MG/DL (ref 70–99)
HCO3 SERPL-SCNC: 26 MMOL/L (ref 22–29)
POTASSIUM SERPL-SCNC: 3.9 MMOL/L (ref 3.4–5.3)
SODIUM SERPL-SCNC: 140 MMOL/L (ref 135–145)

## 2025-02-12 NOTE — TELEPHONE ENCOUNTER
----- Message from Yohan Penn sent at 2/12/2025  3:11 PM CST -----  Team - please call patient with results.     Yuridia Hallman Anthony,    I hope you have been well since our last visit. Below are the results from the testing completed at the visit.     The x-ray is overall reassuring.  It shows that you have some stool in your colon as we discussed in clinic.  The urine is not concerning for urinary tract infection.  Kidneys and electrolytes are doing well.  Blood counts are normal.  The inflammation markers are elevated.      Dr. Penn recommends that you continue on the plan as discussed in clinic.  If symptoms are not resolving quickly, please go to the emergency room for reevaluation.    If there are any questions or concerns, please call the clinic or schedule an appointment for follow up.     Best wishes,           Yohan Penn MD  [facility]  2/12/2025  3:09 PM    Side note: Reviewed CT abdomen pelvis scan from 6/28/2024.  No concerning findings were noted at that time and patient was endorsing similar symptoms.  No mention of diverticulosis noted on the CT scan.

## 2025-02-13 NOTE — TELEPHONE ENCOUNTER
"Writer attempt #1 to call patient with the help of a \"Yany/Jose D\"   (ID # [id number]) regarding clinician's message below. No answer, unable to leave VM.    If patient / family calls back, please relay clinician's message to them. Thanks.    Jeff Nicolas, MICHAELN, RN, PHN   [facility]    "

## 2025-02-14 NOTE — TELEPHONE ENCOUNTER
2nd attempt----[email or web address] Patient not available at the time via voice message on phone. Unable to leave a message---no option for VM. If patient calls back please relay test results.       Jez Martin, MSN, RN   [facility]

## 2025-02-17 NOTE — TELEPHONE ENCOUNTER
"Writer attempt #3 to call patient with the help of a \"Yany/Jose D\"   (ID # [id number]) regarding clinician's message below. Clinician's message relayed to patient.    Patient states her symptoms are not entirely better so she wishes to be seen again. Assisted in scheduling patient for:    Feb 18, 2025 12:30 PM  (Arrive by 12:10 PM)  Provider Visit with Campbell Honeycutt MD  [facility] ([facility]) [phone]     Patient verbalizes understanding, agrees with plan and has no further questions.    MICHAEL ContrerasN, RN, PHN   [facility]    "

## 2025-02-18 ENCOUNTER — OFFICE VISIT (OUTPATIENT)
Dept: FAMILY MEDICINE | Facility: CLINIC | Age: 60
End: 2025-02-18

## 2025-02-18 VITALS
OXYGEN SATURATION: 97 % | WEIGHT: 144.08 LBS | DIASTOLIC BLOOD PRESSURE: 74 MMHG | HEIGHT: 62 IN | SYSTOLIC BLOOD PRESSURE: 112 MMHG | TEMPERATURE: 97.6 F | HEART RATE: 71 BPM | BODY MASS INDEX: 26.51 KG/M2 | RESPIRATION RATE: 16 BRPM

## 2025-02-18 DIAGNOSIS — R73.09 ELEVATED GLUCOSE: ICD-10-CM

## 2025-02-18 DIAGNOSIS — R79.82 ELEVATED C-REACTIVE PROTEIN (CRP): ICD-10-CM

## 2025-02-18 DIAGNOSIS — Z59.71 DOES NOT HAVE HEALTH INSURANCE: ICD-10-CM

## 2025-02-18 DIAGNOSIS — M35.3 PMR (POLYMYALGIA RHEUMATICA): Primary | ICD-10-CM

## 2025-02-18 LAB
EST. AVERAGE GLUCOSE BLD GHB EST-MCNC: 128 MG/DL
HBA1C MFR BLD: 6.1 % (ref 0–5.6)

## 2025-02-18 PROCEDURE — 83036 HEMOGLOBIN GLYCOSYLATED A1C: CPT | Performed by: FAMILY MEDICINE

## 2025-02-18 PROCEDURE — 86140 C-REACTIVE PROTEIN: CPT | Performed by: FAMILY MEDICINE

## 2025-02-18 PROCEDURE — 36415 COLL VENOUS BLD VENIPUNCTURE: CPT | Performed by: FAMILY MEDICINE

## 2025-02-18 PROCEDURE — 99214 OFFICE O/P EST MOD 30 MIN: CPT | Performed by: FAMILY MEDICINE

## 2025-02-18 RX ORDER — PREDNISONE 5 MG/1
15 TABLET ORAL DAILY
Qty: 90 TABLET | Refills: 2 | Status: SHIPPED | OUTPATIENT
Start: 2025-02-18

## 2025-02-18 NOTE — PROGRESS NOTES
"  {PROVIDER CHARTING PREFERENCE:902047}    Subjective   Law is a 60 year old, presenting for the following health issues:  Abdominal Pain      2/18/2025    11:52 AM   Additional Questions   Roomed by Jane RUDD MA   Accompanied by SELF     History of Present Illness       Reason for visit:  Abdominal pain  Symptom onset:  1-2 weeks ago  Symptoms include:  Upper left abdominal pain spreads to the pelvic region  Symptom intensity:  Moderate        {MA/LPN/RN Pre-Provider Visit Orders- hCG/UA/Strep (Optional):852047}  {SUPERLIST (Optional):871641}  {additonal problems for provider to add (Optional):213055}    {ROS Picklists (Optional):440283}      Objective    /74   Pulse 71   Temp 97.6  F (36.4  C) (Temporal)   Resp 16   Ht 1.565 m (5' 1.61\")   Wt 65.4 kg (144 lb 1.3 oz)   LMP 05/01/2021 (Within Weeks)   SpO2 97%   BMI 26.69 kg/m    Body mass index is 26.69 kg/m .  Physical Exam   {Exam List (Optional):507282}    {Diagnostic Test Results (Optional):330931}        Signed Electronically by: Campbell Honeycutt MD, MD  {Email feedback regarding this note to [email or web address]   :659158}  " "(DELTASONE) 5 MG tablet Take 3 tablets (15 mg) by mouth daily. 90 tablet 2    gabapentin (NEURONTIN) 100 MG capsule Take 1 capsule (100 mg) by mouth 2 times daily 60 capsule 11                   Objective    /74   Pulse 71   Temp 97.6  F (36.4  C) (Temporal)   Resp 16   Ht 1.565 m (5' 1.61\")   Wt 65.4 kg (144 lb 1.3 oz)   LMP 05/01/2021 (Within Weeks)   SpO2 97%   BMI 26.69 kg/m    Body mass index is 26.69 kg/m .  Physical Exam     Heart normal  Lungs normal  Abdomen normal    A1c 6.1          Signed Electronically by: Campbell Honeycutt MD    "

## 2025-02-19 LAB — CRP SERPL-MCNC: 21.6 MG/L

## 2025-03-03 ENCOUNTER — PATIENT OUTREACH (OUTPATIENT)
Dept: CARE COORDINATION | Facility: CLINIC | Age: 60
End: 2025-03-03

## 2025-03-03 NOTE — PROGRESS NOTES
FRW Update   3/3/25 Outreach attempted x 1 was unable to reach. Left message on voicemail with call back information and requested return call.  Plan: Current outreach date reflects FRW 's follow up within two weeks.

## 2025-03-18 ENCOUNTER — PATIENT OUTREACH (OUTPATIENT)
Dept: CARE COORDINATION | Facility: CLINIC | Age: 60
End: 2025-03-18

## 2025-03-24 ENCOUNTER — PATIENT OUTREACH (OUTPATIENT)
Dept: CARE COORDINATION | Facility: CLINIC | Age: 60
End: 2025-03-24

## 2025-03-24 NOTE — PROGRESS NOTES
FRW Update  3/24/25 3:15 pm Outreach attempted x 1 was unable to reach. Left message on voicemail with call back information and requested return call.  Plan: Current outreach date reflects FRW 's follow up within one week.

## 2025-03-24 NOTE — PROGRESS NOTES
FRW Update  3/24/25 FRW called and and patient stated that she was not able to complete the application at 11: she would like to wait tell her daughter pay moo.Patient stated that daughter speaks english

## 2025-04-07 ENCOUNTER — PATIENT OUTREACH (OUTPATIENT)
Dept: CARE COORDINATION | Facility: CLINIC | Age: 60
End: 2025-04-07

## 2025-04-10 ENCOUNTER — PATIENT OUTREACH (OUTPATIENT)
Dept: CARE COORDINATION | Facility: CLINIC | Age: 60
End: 2025-04-10

## 2025-04-18 ENCOUNTER — OFFICE VISIT (OUTPATIENT)
Dept: FAMILY MEDICINE | Facility: CLINIC | Age: 60
End: 2025-04-18
Attending: FAMILY MEDICINE

## 2025-04-18 VITALS
TEMPERATURE: 97.8 F | HEART RATE: 66 BPM | BODY MASS INDEX: 25.76 KG/M2 | RESPIRATION RATE: 16 BRPM | SYSTOLIC BLOOD PRESSURE: 138 MMHG | DIASTOLIC BLOOD PRESSURE: 80 MMHG | OXYGEN SATURATION: 99 % | WEIGHT: 140 LBS | HEIGHT: 62 IN

## 2025-04-18 DIAGNOSIS — R30.0 DYSURIA: ICD-10-CM

## 2025-04-18 DIAGNOSIS — M35.3 PMR (POLYMYALGIA RHEUMATICA): Primary | ICD-10-CM

## 2025-04-18 DIAGNOSIS — R79.82 ELEVATED C-REACTIVE PROTEIN (CRP): ICD-10-CM

## 2025-04-18 LAB
ALBUMIN UR-MCNC: NEGATIVE MG/DL
APPEARANCE UR: ABNORMAL
BACTERIA #/AREA URNS HPF: ABNORMAL /HPF
BILIRUB UR QL STRIP: NEGATIVE
COLOR UR AUTO: YELLOW
GLUCOSE UR STRIP-MCNC: NEGATIVE MG/DL
HGB UR QL STRIP: NEGATIVE
KETONES UR STRIP-MCNC: NEGATIVE MG/DL
LEUKOCYTE ESTERASE UR QL STRIP: ABNORMAL
MUCOUS THREADS #/AREA URNS LPF: PRESENT /LPF
NITRATE UR QL: NEGATIVE
PH UR STRIP: 5.5 [PH] (ref 5–7)
RBC #/AREA URNS AUTO: ABNORMAL /HPF
SP GR UR STRIP: >=1.03 (ref 1–1.03)
SQUAMOUS #/AREA URNS AUTO: ABNORMAL /LPF
UROBILINOGEN UR STRIP-ACNC: 0.2 E.U./DL
WBC #/AREA URNS AUTO: ABNORMAL /HPF

## 2025-04-18 PROCEDURE — 86140 C-REACTIVE PROTEIN: CPT | Performed by: FAMILY MEDICINE

## 2025-04-18 PROCEDURE — 86431 RHEUMATOID FACTOR QUANT: CPT | Performed by: FAMILY MEDICINE

## 2025-04-18 PROCEDURE — 36415 COLL VENOUS BLD VENIPUNCTURE: CPT | Performed by: FAMILY MEDICINE

## 2025-04-18 PROCEDURE — 99214 OFFICE O/P EST MOD 30 MIN: CPT | Performed by: FAMILY MEDICINE

## 2025-04-18 PROCEDURE — T1013 SIGN LANG/ORAL INTERPRETER: HCPCS

## 2025-04-18 PROCEDURE — 86038 ANTINUCLEAR ANTIBODIES: CPT | Performed by: FAMILY MEDICINE

## 2025-04-18 PROCEDURE — 81001 URINALYSIS AUTO W/SCOPE: CPT | Performed by: FAMILY MEDICINE

## 2025-04-18 RX ORDER — PREDNISONE 5 MG/1
5 TABLET ORAL DAILY
Qty: 30 TABLET | Refills: 1 | Status: SHIPPED | OUTPATIENT
Start: 2025-04-18

## 2025-04-18 NOTE — PROGRESS NOTES
"  {PROVIDER CHARTING PREFERENCE:295663}    Subjective   Law is a 60 year old, presenting for the following health issues:  Follow Up (polymyalgia rheumatica), Dizziness, body shaking, and Recheck Medication      4/18/2025     3:06 PM   Additional Questions   Roomed by Arie WOLF   Accompanied by son     History of Present Illness       Reason for visit:  Follow up - PMR (POLYMYLAGIA RHEUMATICA)   She is taking medications regularly.        {MA/LPN/RN Pre-Provider Visit Orders- hCG/UA/Strep (Optional):080669}  {SUPERLIST (Optional):917044}  {additonal problems for provider to add (Optional):370627}    {ROS Picklists (Optional):739472}      Objective    /80 (BP Location: Left arm, Patient Position: Sitting, Cuff Size: Adult Regular)   Pulse 66   Temp 97.8  F (36.6  C) (Oral)   Resp 16   Ht 1.565 m (5' 1.61\")   Wt 63.5 kg (140 lb)   LMP 05/01/2021 (Within Weeks)   SpO2 99%   BMI 25.93 kg/m    Body mass index is 25.93 kg/m .  Physical Exam   {Exam List (Optional):502083}    {Diagnostic Test Results (Optional):872138}        Signed Electronically by: Campbell Honeycutt MD, MD  {Email feedback regarding this note to [email or web address]   :070820}  " "times daily 60 capsule 11    polyethylene glycol (MIRALAX) 17 GM/Dose powder Take 17 g (1 Capful) by mouth daily as needed for constipation. (Patient not taking: Reported on 4/18/2025) 578 g 1                   Objective    /80 (BP Location: Left arm, Patient Position: Sitting, Cuff Size: Adult Regular)   Pulse 66   Temp 97.8  F (36.6  C) (Oral)   Resp 16   Ht 1.565 m (5' 1.61\")   Wt 63.5 kg (140 lb)   LMP 05/01/2021 (Within Weeks)   SpO2 99%   BMI 25.93 kg/m    Body mass index is 25.93 kg/m .  Physical Exam     Heart normal  Lungs normal  No CVA tenderness    UA neg          Signed Electronically by: Campbell Honeycutt MD    "

## 2025-04-19 LAB
CRP SERPL-MCNC: 4.96 MG/L
RHEUMATOID FACT SERPL-ACNC: <10 IU/ML

## 2025-04-21 LAB — ANA SER QL IF: NEGATIVE

## 2025-05-30 ENCOUNTER — OFFICE VISIT (OUTPATIENT)
Dept: FAMILY MEDICINE | Facility: CLINIC | Age: 60
End: 2025-05-30

## 2025-05-30 VITALS
TEMPERATURE: 98.7 F | DIASTOLIC BLOOD PRESSURE: 76 MMHG | OXYGEN SATURATION: 97 % | WEIGHT: 138 LBS | BODY MASS INDEX: 25.4 KG/M2 | HEIGHT: 62 IN | HEART RATE: 71 BPM | SYSTOLIC BLOOD PRESSURE: 126 MMHG | RESPIRATION RATE: 18 BRPM

## 2025-05-30 DIAGNOSIS — D32.9 MENINGIOMA (H): ICD-10-CM

## 2025-05-30 DIAGNOSIS — R30.0 DYSURIA: ICD-10-CM

## 2025-05-30 DIAGNOSIS — M35.3 PMR (POLYMYALGIA RHEUMATICA): Primary | ICD-10-CM

## 2025-05-30 LAB
ALBUMIN UR-MCNC: NEGATIVE MG/DL
APPEARANCE UR: CLEAR
BACTERIA #/AREA URNS HPF: ABNORMAL /HPF
BILIRUB UR QL STRIP: NEGATIVE
COLOR UR AUTO: YELLOW
GLUCOSE UR STRIP-MCNC: NEGATIVE MG/DL
HGB UR QL STRIP: NEGATIVE
KETONES UR STRIP-MCNC: NEGATIVE MG/DL
LEUKOCYTE ESTERASE UR QL STRIP: ABNORMAL
MUCOUS THREADS #/AREA URNS LPF: PRESENT /LPF
NITRATE UR QL: NEGATIVE
PH UR STRIP: 5.5 [PH] (ref 5–7)
RBC #/AREA URNS AUTO: ABNORMAL /HPF
SP GR UR STRIP: 1.02 (ref 1–1.03)
SQUAMOUS #/AREA URNS AUTO: ABNORMAL /LPF
UROBILINOGEN UR STRIP-ACNC: 0.2 E.U./DL
WBC #/AREA URNS AUTO: ABNORMAL /HPF

## 2025-05-30 PROCEDURE — 86140 C-REACTIVE PROTEIN: CPT | Performed by: FAMILY MEDICINE

## 2025-05-30 PROCEDURE — 36415 COLL VENOUS BLD VENIPUNCTURE: CPT | Performed by: FAMILY MEDICINE

## 2025-05-30 PROCEDURE — 99213 OFFICE O/P EST LOW 20 MIN: CPT | Performed by: FAMILY MEDICINE

## 2025-05-30 PROCEDURE — 81001 URINALYSIS AUTO W/SCOPE: CPT | Performed by: FAMILY MEDICINE

## 2025-05-30 RX ORDER — PREDNISONE 5 MG/1
2.5 TABLET ORAL DAILY
Qty: 15 TABLET | Refills: 1 | Status: SHIPPED | OUTPATIENT
Start: 2025-05-30

## 2025-05-30 NOTE — PROGRESS NOTES
"  {PROVIDER CHARTING PREFERENCE:009072}    Lata Hallman is a 60 year old, presenting for the following health issues:  Follow Up      5/30/2025     1:56 PM   Additional Questions   Roomed by ABDELRAHMAN STAFFORD     History of Present Illness       Reason for visit:  F/U MED    She eats 2-3 servings of fruits and vegetables daily.She consumes 0 sweetened beverage(s) daily.She exercises with enough effort to increase her heart rate 9 or less minutes per day.  She exercises with enough effort to increase her heart rate 3 or less days per week.   She is taking medications regularly.        {MA/LPN/RN Pre-Provider Visit Orders- hCG/UA/Strep (Optional):000306}  {SUPERLIST (Optional):341534}  {additonal problems for provider to add (Optional):289486}    {ROS Picklists (Optional):276622}      Objective    /76   Pulse 71   Temp 98.7  F (37.1  C) (Oral)   Resp 18   Ht 1.565 m (5' 1.61\")   Wt 62.6 kg (138 lb)   LMP 05/01/2021 (Within Weeks)   SpO2 97%   BMI 25.56 kg/m    Body mass index is 25.56 kg/m .  Physical Exam   {Exam List (Optional):870829}    {Diagnostic Test Results (Optional):859205}        Signed Electronically by: Campbell Honeycutt MD, MD  {Email feedback regarding this note to [email or web address]   :452180}  " "17 GM/Dose powder Take 17 g (1 Capful) by mouth daily as needed for constipation. (Patient not taking: Reported on 4/18/2025) 578 g 1                   Objective    /76   Pulse 71   Temp 98.7  F (37.1  C) (Oral)   Resp 18   Ht 1.565 m (5' 1.61\")   Wt 62.6 kg (138 lb)   LMP 05/01/2021 (Within Weeks)   SpO2 97%   BMI 25.56 kg/m    Body mass index is 25.56 kg/m .  Physical Exam     Heart normal  Lungs normal  Abdomen normal  Back: no CVA tenderness          Signed Electronically by: Campbell Honeycutt MD    "

## 2025-05-31 LAB — CRP SERPL-MCNC: 10.3 MG/L

## 2025-06-30 ENCOUNTER — PATIENT OUTREACH (OUTPATIENT)
Dept: CARE COORDINATION | Facility: CLINIC | Age: 60
End: 2025-06-30

## 2025-06-30 NOTE — LETTER
[facility] CARE COORDINATION        June 30, 2025      Law [facility]  [address] [place] [address]        Dear Law,                                                                      The Financial Resource team has attempted to reach to you to assist you with any financial barriers you may be facing in your healthcare journey.  We would like to provide any additional support you may need related to financial support for your healthcare.  Our team are Certified MNSure Navigators, and we offer support with application assistance for Medical Assistance, MNSure Applications, Energy Assistance, Emergency Assistance, Food Assistance and many other initial applications for [facility] benefits.     I would appreciate if you would call me at [phone] to let me know if you would like assistance.      Sincerely,                                                                         hCristina Cooney MA

## 2025-06-30 NOTE — PROGRESS NOTES
FRW Update  Established outreach attempted x 2. Left message on voicemail indicating last outreach attempt.   Plan: FRW closed the FRW program, FAM Case, FAM Tracker and will send an unreachable letter. Patient can be referred back to FRW if needed.

## 2025-07-30 ENCOUNTER — OFFICE VISIT (OUTPATIENT)
Dept: FAMILY MEDICINE | Facility: CLINIC | Age: 60
End: 2025-07-30

## 2025-07-30 VITALS
SYSTOLIC BLOOD PRESSURE: 112 MMHG | WEIGHT: 135.5 LBS | DIASTOLIC BLOOD PRESSURE: 73 MMHG | HEART RATE: 53 BPM | OXYGEN SATURATION: 99 % | TEMPERATURE: 97.7 F | RESPIRATION RATE: 16 BRPM | HEIGHT: 61 IN | BODY MASS INDEX: 25.58 KG/M2

## 2025-07-30 DIAGNOSIS — M35.3 PMR (POLYMYALGIA RHEUMATICA): Primary | ICD-10-CM

## 2025-07-30 DIAGNOSIS — Z59.71 DOES NOT HAVE HEALTH INSURANCE: ICD-10-CM

## 2025-07-30 DIAGNOSIS — R42 VERTIGO: ICD-10-CM

## 2025-07-30 DIAGNOSIS — R52 PAIN: ICD-10-CM

## 2025-07-30 PROCEDURE — 99213 OFFICE O/P EST LOW 20 MIN: CPT | Performed by: FAMILY MEDICINE

## 2025-07-30 RX ORDER — MECLIZINE HYDROCHLORIDE 25 MG/1
25 TABLET ORAL 3 TIMES DAILY PRN
Qty: 30 TABLET | Refills: 3 | Status: SHIPPED | OUTPATIENT
Start: 2025-07-30

## 2025-07-30 RX ORDER — PREDNISONE 5 MG/1
2.5 TABLET ORAL DAILY
Qty: 15 TABLET | Refills: 3 | Status: SHIPPED | OUTPATIENT
Start: 2025-07-30

## 2025-07-30 RX ORDER — ACETAMINOPHEN 500 MG
500 TABLET ORAL EVERY 6 HOURS PRN
Qty: 100 TABLET | Refills: 11 | Status: SHIPPED | OUTPATIENT
Start: 2025-07-30

## 2025-08-12 ENCOUNTER — PATIENT OUTREACH (OUTPATIENT)
Dept: CARE COORDINATION | Facility: CLINIC | Age: 60
End: 2025-08-12

## 2025-08-14 ENCOUNTER — PATIENT OUTREACH (OUTPATIENT)
Dept: CARE COORDINATION | Facility: CLINIC | Age: 60
End: 2025-08-14